# Patient Record
Sex: MALE | Race: WHITE | NOT HISPANIC OR LATINO | Employment: OTHER | URBAN - METROPOLITAN AREA
[De-identification: names, ages, dates, MRNs, and addresses within clinical notes are randomized per-mention and may not be internally consistent; named-entity substitution may affect disease eponyms.]

---

## 2024-09-02 ENCOUNTER — APPOINTMENT (OUTPATIENT)
Dept: CT IMAGING | Facility: HOSPITAL | Age: 84
End: 2024-09-02
Payer: MEDICARE

## 2024-09-02 ENCOUNTER — APPOINTMENT (OUTPATIENT)
Dept: GENERAL RADIOLOGY | Facility: HOSPITAL | Age: 84
End: 2024-09-02
Payer: MEDICARE

## 2024-09-02 ENCOUNTER — HOSPITAL ENCOUNTER (INPATIENT)
Facility: HOSPITAL | Age: 84
LOS: 8 days | Discharge: HOME HEALTH | End: 2024-09-10
Attending: EMERGENCY MEDICINE | Admitting: HOSPITALIST
Payer: MEDICARE

## 2024-09-02 DIAGNOSIS — F03.90 DEMENTIA WITHOUT BEHAVIORAL DISTURBANCE: ICD-10-CM

## 2024-09-02 DIAGNOSIS — G93.40 ACUTE ENCEPHALOPATHY: ICD-10-CM

## 2024-09-02 DIAGNOSIS — A41.9 SEPSIS, DUE TO UNSPECIFIED ORGANISM, UNSPECIFIED WHETHER ACUTE ORGAN DYSFUNCTION PRESENT: Primary | ICD-10-CM

## 2024-09-02 DIAGNOSIS — J18.9 MULTIFOCAL PNEUMONIA: ICD-10-CM

## 2024-09-02 DIAGNOSIS — N17.9 ACUTE KIDNEY INJURY: ICD-10-CM

## 2024-09-02 DIAGNOSIS — J84.10 PULMONARY FIBROSIS: ICD-10-CM

## 2024-09-02 LAB
ALBUMIN SERPL-MCNC: 3.3 G/DL (ref 3.5–5.2)
ALBUMIN/GLOB SERPL: 1.1 G/DL
ALP SERPL-CCNC: 77 U/L (ref 39–117)
ALT SERPL W P-5'-P-CCNC: <5 U/L (ref 1–41)
AMMONIA BLD-SCNC: 29 UMOL/L (ref 16–60)
AMPHET+METHAMPHET UR QL: NEGATIVE
ANION GAP SERPL CALCULATED.3IONS-SCNC: 15 MMOL/L (ref 5–15)
AST SERPL-CCNC: 29 U/L (ref 1–40)
BARBITURATES UR QL SCN: NEGATIVE
BASOPHILS # BLD AUTO: 0.07 10*3/MM3 (ref 0–0.2)
BASOPHILS NFR BLD AUTO: 0.3 % (ref 0–1.5)
BENZODIAZ UR QL SCN: POSITIVE
BILIRUB SERPL-MCNC: 0.5 MG/DL (ref 0–1.2)
BUN SERPL-MCNC: 38 MG/DL (ref 8–23)
BUN/CREAT SERPL: 12.1 (ref 7–25)
CALCIUM SPEC-SCNC: 8.3 MG/DL (ref 8.6–10.5)
CANNABINOIDS SERPL QL: NEGATIVE
CHLORIDE SERPL-SCNC: 96 MMOL/L (ref 98–107)
CO2 SERPL-SCNC: 19 MMOL/L (ref 22–29)
COCAINE UR QL: NEGATIVE
CREAT SERPL-MCNC: 3.13 MG/DL (ref 0.76–1.27)
D-LACTATE SERPL-SCNC: 4.6 MMOL/L (ref 0.5–2)
DEPRECATED RDW RBC AUTO: 49.5 FL (ref 37–54)
EGFRCR SERPLBLD CKD-EPI 2021: 18.6 ML/MIN/1.73
EOSINOPHIL # BLD AUTO: 0.15 10*3/MM3 (ref 0–0.4)
EOSINOPHIL NFR BLD AUTO: 0.6 % (ref 0.3–6.2)
ERYTHROCYTE [DISTWIDTH] IN BLOOD BY AUTOMATED COUNT: 15.2 % (ref 12.3–15.4)
ETHANOL BLD-MCNC: <10 MG/DL (ref 0–10)
ETHANOL UR QL: <0.01 %
FENTANYL UR-MCNC: NEGATIVE NG/ML
GLOBULIN UR ELPH-MCNC: 3.1 GM/DL
GLUCOSE SERPL-MCNC: 141 MG/DL (ref 65–99)
HCT VFR BLD AUTO: 33.2 % (ref 37.5–51)
HGB BLD-MCNC: 10.9 G/DL (ref 13–17.7)
IMM GRANULOCYTES # BLD AUTO: 0.9 10*3/MM3 (ref 0–0.05)
IMM GRANULOCYTES NFR BLD AUTO: 3.5 % (ref 0–0.5)
INR PPP: 1.39 (ref 0.9–1.1)
LYMPHOCYTES # BLD AUTO: 1.79 10*3/MM3 (ref 0.7–3.1)
LYMPHOCYTES NFR BLD AUTO: 6.9 % (ref 19.6–45.3)
MAGNESIUM SERPL-MCNC: 1.8 MG/DL (ref 1.6–2.4)
MCH RBC QN AUTO: 29.3 PG (ref 26.6–33)
MCHC RBC AUTO-ENTMCNC: 32.8 G/DL (ref 31.5–35.7)
MCV RBC AUTO: 89.2 FL (ref 79–97)
METHADONE UR QL SCN: NEGATIVE
MONOCYTES # BLD AUTO: 1.7 10*3/MM3 (ref 0.1–0.9)
MONOCYTES NFR BLD AUTO: 6.6 % (ref 5–12)
NEUTROPHILS NFR BLD AUTO: 21.17 10*3/MM3 (ref 1.7–7)
NEUTROPHILS NFR BLD AUTO: 82.1 % (ref 42.7–76)
NRBC BLD AUTO-RTO: 0 /100 WBC (ref 0–0.2)
OPIATES UR QL: NEGATIVE
OXYCODONE UR QL SCN: NEGATIVE
PLATELET # BLD AUTO: 190 10*3/MM3 (ref 140–450)
PMV BLD AUTO: 9.3 FL (ref 6–12)
POTASSIUM SERPL-SCNC: 5.4 MMOL/L (ref 3.5–5.2)
PROCALCITONIN SERPL-MCNC: 48.3 NG/ML (ref 0–0.25)
PROT SERPL-MCNC: 6.4 G/DL (ref 6–8.5)
PROTHROMBIN TIME: 17.3 SECONDS (ref 11.7–14.2)
RBC # BLD AUTO: 3.72 10*6/MM3 (ref 4.14–5.8)
SODIUM SERPL-SCNC: 130 MMOL/L (ref 136–145)
TSH SERPL DL<=0.05 MIU/L-ACNC: 1.88 UIU/ML (ref 0.27–4.2)
WBC NRBC COR # BLD AUTO: 25.78 10*3/MM3 (ref 3.4–10.8)

## 2024-09-02 PROCEDURE — 25010000002 CEFTRIAXONE PER 250 MG: Performed by: EMERGENCY MEDICINE

## 2024-09-02 PROCEDURE — 80307 DRUG TEST PRSMV CHEM ANLYZR: CPT | Performed by: EMERGENCY MEDICINE

## 2024-09-02 PROCEDURE — 87040 BLOOD CULTURE FOR BACTERIA: CPT | Performed by: EMERGENCY MEDICINE

## 2024-09-02 PROCEDURE — 83605 ASSAY OF LACTIC ACID: CPT | Performed by: EMERGENCY MEDICINE

## 2024-09-02 PROCEDURE — 84145 PROCALCITONIN (PCT): CPT | Performed by: EMERGENCY MEDICINE

## 2024-09-02 PROCEDURE — 36415 COLL VENOUS BLD VENIPUNCTURE: CPT

## 2024-09-02 PROCEDURE — 81001 URINALYSIS AUTO W/SCOPE: CPT | Performed by: EMERGENCY MEDICINE

## 2024-09-02 PROCEDURE — 25810000003 SEPSIS FLUID NS 0.9 % SOLUTION: Performed by: EMERGENCY MEDICINE

## 2024-09-02 PROCEDURE — 87086 URINE CULTURE/COLONY COUNT: CPT | Performed by: HOSPITALIST

## 2024-09-02 PROCEDURE — 85610 PROTHROMBIN TIME: CPT | Performed by: EMERGENCY MEDICINE

## 2024-09-02 PROCEDURE — 80053 COMPREHEN METABOLIC PANEL: CPT | Performed by: EMERGENCY MEDICINE

## 2024-09-02 PROCEDURE — 99285 EMERGENCY DEPT VISIT HI MDM: CPT

## 2024-09-02 PROCEDURE — 83735 ASSAY OF MAGNESIUM: CPT | Performed by: EMERGENCY MEDICINE

## 2024-09-02 PROCEDURE — 85025 COMPLETE CBC W/AUTO DIFF WBC: CPT | Performed by: EMERGENCY MEDICINE

## 2024-09-02 PROCEDURE — 82077 ASSAY SPEC XCP UR&BREATH IA: CPT | Performed by: EMERGENCY MEDICINE

## 2024-09-02 PROCEDURE — 84443 ASSAY THYROID STIM HORMONE: CPT | Performed by: EMERGENCY MEDICINE

## 2024-09-02 PROCEDURE — 70450 CT HEAD/BRAIN W/O DYE: CPT

## 2024-09-02 PROCEDURE — 71045 X-RAY EXAM CHEST 1 VIEW: CPT

## 2024-09-02 PROCEDURE — 82140 ASSAY OF AMMONIA: CPT | Performed by: EMERGENCY MEDICINE

## 2024-09-02 RX ADMIN — SODIUM CHLORIDE 3480 ML: 9 INJECTION, SOLUTION INTRAVENOUS at 21:37

## 2024-09-02 RX ADMIN — CEFTRIAXONE 2000 MG: 2 INJECTION, POWDER, FOR SOLUTION INTRAMUSCULAR; INTRAVENOUS at 23:43

## 2024-09-03 ENCOUNTER — APPOINTMENT (OUTPATIENT)
Dept: CT IMAGING | Facility: HOSPITAL | Age: 84
End: 2024-09-03
Payer: MEDICARE

## 2024-09-03 PROBLEM — N17.9 AKI (ACUTE KIDNEY INJURY): Status: ACTIVE | Noted: 2024-09-03

## 2024-09-03 PROBLEM — M35.3 PMR (POLYMYALGIA RHEUMATICA): Status: ACTIVE | Noted: 2024-09-03

## 2024-09-03 PROBLEM — J84.10 PULMONARY FIBROSIS: Status: ACTIVE | Noted: 2024-09-03

## 2024-09-03 PROBLEM — G20.A1 PARKINSON DISEASE: Status: ACTIVE | Noted: 2024-09-03

## 2024-09-03 PROBLEM — N18.32 STAGE 3B CHRONIC KIDNEY DISEASE: Status: ACTIVE | Noted: 2024-09-03

## 2024-09-03 PROBLEM — F03.90 DEMENTIA WITHOUT BEHAVIORAL DISTURBANCE: Status: ACTIVE | Noted: 2024-09-03

## 2024-09-03 PROBLEM — M06.00 RHEUMATOID ARTHRITIS WITH NEGATIVE RHEUMATOID FACTOR: Status: ACTIVE | Noted: 2024-09-03

## 2024-09-03 PROBLEM — R19.7 DIARRHEA: Status: ACTIVE | Noted: 2024-09-03

## 2024-09-03 PROBLEM — Z79.52 CURRENT CHRONIC USE OF SYSTEMIC STEROIDS: Status: ACTIVE | Noted: 2024-09-03

## 2024-09-03 LAB
AMORPH URATE CRY URNS QL MICRO: ABNORMAL /HPF
ANION GAP SERPL CALCULATED.3IONS-SCNC: 17.7 MMOL/L (ref 5–15)
BACTERIA UR QL AUTO: ABNORMAL /HPF
BILIRUB UR QL STRIP: NEGATIVE
BUN SERPL-MCNC: 38 MG/DL (ref 8–23)
BUN/CREAT SERPL: 12.5 (ref 7–25)
C DIFF TOX GENS STL QL NAA+PROBE: NEGATIVE
CALCIUM SPEC-SCNC: 7.9 MG/DL (ref 8.6–10.5)
CHLORIDE SERPL-SCNC: 98 MMOL/L (ref 98–107)
CLARITY UR: ABNORMAL
CO2 SERPL-SCNC: 18.3 MMOL/L (ref 22–29)
COLOR UR: YELLOW
CREAT SERPL-MCNC: 3.05 MG/DL (ref 0.76–1.27)
D-LACTATE SERPL-SCNC: 2.2 MMOL/L (ref 0.5–2)
D-LACTATE SERPL-SCNC: 2.5 MMOL/L (ref 0.5–2)
D-LACTATE SERPL-SCNC: 2.7 MMOL/L (ref 0.5–2)
D-LACTATE SERPL-SCNC: 2.8 MMOL/L (ref 0.5–2)
D-LACTATE SERPL-SCNC: 4.3 MMOL/L (ref 0.5–2)
DEPRECATED RDW RBC AUTO: 46.6 FL (ref 37–54)
EGFRCR SERPLBLD CKD-EPI 2021: 19.2 ML/MIN/1.73
ERYTHROCYTE [DISTWIDTH] IN BLOOD BY AUTOMATED COUNT: 14.9 % (ref 12.3–15.4)
GLUCOSE SERPL-MCNC: 113 MG/DL (ref 65–99)
GLUCOSE UR STRIP-MCNC: NEGATIVE MG/DL
GRAN CASTS URNS QL MICRO: ABNORMAL /LPF
HCT VFR BLD AUTO: 29.8 % (ref 37.5–51)
HGB BLD-MCNC: 9.8 G/DL (ref 13–17.7)
HGB UR QL STRIP.AUTO: NEGATIVE
HYALINE CASTS UR QL AUTO: ABNORMAL /LPF
KETONES UR QL STRIP: ABNORMAL
LEUKOCYTE ESTERASE UR QL STRIP.AUTO: ABNORMAL
MCH RBC QN AUTO: 28.6 PG (ref 26.6–33)
MCHC RBC AUTO-ENTMCNC: 32.9 G/DL (ref 31.5–35.7)
MCV RBC AUTO: 86.9 FL (ref 79–97)
MRSA DNA SPEC QL NAA+PROBE: NORMAL
NITRITE UR QL STRIP: NEGATIVE
PH UR STRIP.AUTO: 5.5 [PH] (ref 5–8)
PLATELET # BLD AUTO: 183 10*3/MM3 (ref 140–450)
PMV BLD AUTO: 9.4 FL (ref 6–12)
POTASSIUM SERPL-SCNC: 4.4 MMOL/L (ref 3.5–5.2)
PROT UR QL STRIP: ABNORMAL
RBC # BLD AUTO: 3.43 10*6/MM3 (ref 4.14–5.8)
RBC # UR STRIP: ABNORMAL /HPF
REF LAB TEST METHOD: ABNORMAL
SODIUM SERPL-SCNC: 134 MMOL/L (ref 136–145)
SP GR UR STRIP: 1.02 (ref 1–1.03)
SQUAMOUS #/AREA URNS HPF: ABNORMAL /HPF
UROBILINOGEN UR QL STRIP: ABNORMAL
WBC # UR STRIP: ABNORMAL /HPF
WBC NRBC COR # BLD AUTO: 23.05 10*3/MM3 (ref 3.4–10.8)

## 2024-09-03 PROCEDURE — 94799 UNLISTED PULMONARY SVC/PX: CPT

## 2024-09-03 PROCEDURE — 74176 CT ABD & PELVIS W/O CONTRAST: CPT

## 2024-09-03 PROCEDURE — 36415 COLL VENOUS BLD VENIPUNCTURE: CPT | Performed by: NURSE PRACTITIONER

## 2024-09-03 PROCEDURE — 85027 COMPLETE CBC AUTOMATED: CPT | Performed by: NURSE PRACTITIONER

## 2024-09-03 PROCEDURE — 63710000001 PREDNISONE PER 5 MG: Performed by: HOSPITALIST

## 2024-09-03 PROCEDURE — 80048 BASIC METABOLIC PNL TOTAL CA: CPT | Performed by: NURSE PRACTITIONER

## 2024-09-03 PROCEDURE — 87493 C DIFF AMPLIFIED PROBE: CPT | Performed by: HOSPITALIST

## 2024-09-03 PROCEDURE — 83605 ASSAY OF LACTIC ACID: CPT | Performed by: EMERGENCY MEDICINE

## 2024-09-03 PROCEDURE — 25010000002 VANCOMYCIN 10 G RECONSTITUTED SOLUTION: Performed by: HOSPITALIST

## 2024-09-03 PROCEDURE — 87641 MR-STAPH DNA AMP PROBE: CPT | Performed by: HOSPITALIST

## 2024-09-03 PROCEDURE — 94640 AIRWAY INHALATION TREATMENT: CPT

## 2024-09-03 PROCEDURE — 25010000002 CEFTRIAXONE PER 250 MG: Performed by: HOSPITALIST

## 2024-09-03 PROCEDURE — 94761 N-INVAS EAR/PLS OXIMETRY MLT: CPT

## 2024-09-03 PROCEDURE — 25810000003 SODIUM CHLORIDE 0.9 % SOLUTION: Performed by: HOSPITALIST

## 2024-09-03 RX ORDER — ACETAMINOPHEN 325 MG/1
650 TABLET ORAL EVERY 4 HOURS PRN
Status: DISCONTINUED | OUTPATIENT
Start: 2024-09-03 | End: 2024-09-10 | Stop reason: HOSPADM

## 2024-09-03 RX ORDER — BISACODYL 10 MG
10 SUPPOSITORY, RECTAL RECTAL DAILY PRN
Status: DISCONTINUED | OUTPATIENT
Start: 2024-09-03 | End: 2024-09-10 | Stop reason: HOSPADM

## 2024-09-03 RX ORDER — ACETAMINOPHEN 650 MG/1
650 SUPPOSITORY RECTAL EVERY 4 HOURS PRN
Status: DISCONTINUED | OUTPATIENT
Start: 2024-09-03 | End: 2024-09-10 | Stop reason: HOSPADM

## 2024-09-03 RX ORDER — FLUTICASONE PROPIONATE 50 MCG
2 SPRAY, SUSPENSION (ML) NASAL DAILY
Status: DISCONTINUED | OUTPATIENT
Start: 2024-09-03 | End: 2024-09-10 | Stop reason: HOSPADM

## 2024-09-03 RX ORDER — PROBENECID 500 MG/1
500 TABLET, FILM COATED ORAL 2 TIMES DAILY
COMMUNITY

## 2024-09-03 RX ORDER — PROBENECID 500 MG/1
500 TABLET, FILM COATED ORAL 2 TIMES DAILY
Status: DISCONTINUED | OUTPATIENT
Start: 2024-09-03 | End: 2024-09-04

## 2024-09-03 RX ORDER — ASPIRIN 81 MG/1
81 TABLET ORAL DAILY
Status: DISCONTINUED | OUTPATIENT
Start: 2024-09-03 | End: 2024-09-10 | Stop reason: HOSPADM

## 2024-09-03 RX ORDER — CALCIUM CARBONATE 500 MG/1
2 TABLET, CHEWABLE ORAL 2 TIMES DAILY PRN
Status: DISCONTINUED | OUTPATIENT
Start: 2024-09-03 | End: 2024-09-10 | Stop reason: HOSPADM

## 2024-09-03 RX ORDER — GUAIFENESIN 600 MG/1
600 TABLET, EXTENDED RELEASE ORAL 2 TIMES DAILY
COMMUNITY

## 2024-09-03 RX ORDER — DONEPEZIL HYDROCHLORIDE 10 MG/1
10 TABLET, FILM COATED ORAL DAILY
Status: DISCONTINUED | OUTPATIENT
Start: 2024-09-03 | End: 2024-09-10 | Stop reason: HOSPADM

## 2024-09-03 RX ORDER — POLYETHYLENE GLYCOL 3350 17 G/17G
17 POWDER, FOR SOLUTION ORAL DAILY PRN
Status: DISCONTINUED | OUTPATIENT
Start: 2024-09-03 | End: 2024-09-10 | Stop reason: HOSPADM

## 2024-09-03 RX ORDER — MODAFINIL 100 MG/1
400 TABLET ORAL DAILY
Status: COMPLETED | OUTPATIENT
Start: 2024-09-03 | End: 2024-09-07

## 2024-09-03 RX ORDER — MODAFINIL 100 MG/1
400 TABLET ORAL DAILY
COMMUNITY

## 2024-09-03 RX ORDER — MEMANTINE HYDROCHLORIDE 10 MG/1
10 TABLET ORAL DAILY
COMMUNITY

## 2024-09-03 RX ORDER — TAMSULOSIN HYDROCHLORIDE 0.4 MG/1
1 CAPSULE ORAL 2 TIMES DAILY
COMMUNITY

## 2024-09-03 RX ORDER — ATORVASTATIN CALCIUM 20 MG/1
10 TABLET, FILM COATED ORAL DAILY
Status: DISCONTINUED | OUTPATIENT
Start: 2024-09-03 | End: 2024-09-10 | Stop reason: HOSPADM

## 2024-09-03 RX ORDER — GUAIFENESIN 600 MG/1
600 TABLET, EXTENDED RELEASE ORAL 2 TIMES DAILY
Status: DISCONTINUED | OUTPATIENT
Start: 2024-09-03 | End: 2024-09-10 | Stop reason: HOSPADM

## 2024-09-03 RX ORDER — DOCUSATE SODIUM 100 MG/1
100 CAPSULE, LIQUID FILLED ORAL DAILY
Status: DISCONTINUED | OUTPATIENT
Start: 2024-09-03 | End: 2024-09-04

## 2024-09-03 RX ORDER — MEMANTINE HYDROCHLORIDE 10 MG/1
10 TABLET ORAL DAILY
Status: DISCONTINUED | OUTPATIENT
Start: 2024-09-03 | End: 2024-09-10 | Stop reason: HOSPADM

## 2024-09-03 RX ORDER — SODIUM CHLORIDE 0.9 % (FLUSH) 0.9 %
10 SYRINGE (ML) INJECTION EVERY 12 HOURS SCHEDULED
Status: DISCONTINUED | OUTPATIENT
Start: 2024-09-03 | End: 2024-09-10 | Stop reason: HOSPADM

## 2024-09-03 RX ORDER — CYANOCOBALAMIN 1000 UG/ML
1000 INJECTION, SOLUTION INTRAMUSCULAR; SUBCUTANEOUS
Status: ON HOLD | COMMUNITY
End: 2024-09-03

## 2024-09-03 RX ORDER — ONDANSETRON 4 MG/1
4 TABLET, ORALLY DISINTEGRATING ORAL EVERY 6 HOURS PRN
Status: DISCONTINUED | OUTPATIENT
Start: 2024-09-03 | End: 2024-09-10 | Stop reason: HOSPADM

## 2024-09-03 RX ORDER — ATORVASTATIN CALCIUM 10 MG/1
10 TABLET, FILM COATED ORAL DAILY
COMMUNITY

## 2024-09-03 RX ORDER — IPRATROPIUM BROMIDE AND ALBUTEROL SULFATE 2.5; .5 MG/3ML; MG/3ML
3 SOLUTION RESPIRATORY (INHALATION)
Status: DISCONTINUED | OUTPATIENT
Start: 2024-09-03 | End: 2024-09-10 | Stop reason: HOSPADM

## 2024-09-03 RX ORDER — SODIUM CHLORIDE 9 MG/ML
40 INJECTION, SOLUTION INTRAVENOUS AS NEEDED
Status: DISCONTINUED | OUTPATIENT
Start: 2024-09-03 | End: 2024-09-10 | Stop reason: HOSPADM

## 2024-09-03 RX ORDER — CARBIDOPA AND LEVODOPA 50; 200 MG/1; MG/1
2 TABLET, EXTENDED RELEASE ORAL DAILY
Status: DISCONTINUED | OUTPATIENT
Start: 2024-09-03 | End: 2024-09-10 | Stop reason: HOSPADM

## 2024-09-03 RX ORDER — NITROGLYCERIN 0.4 MG/1
0.4 TABLET SUBLINGUAL
Status: DISCONTINUED | OUTPATIENT
Start: 2024-09-03 | End: 2024-09-10 | Stop reason: HOSPADM

## 2024-09-03 RX ORDER — ASPIRIN 81 MG/1
81 TABLET ORAL DAILY
COMMUNITY

## 2024-09-03 RX ORDER — BISACODYL 5 MG/1
5 TABLET, DELAYED RELEASE ORAL DAILY PRN
Status: DISCONTINUED | OUTPATIENT
Start: 2024-09-03 | End: 2024-09-10 | Stop reason: HOSPADM

## 2024-09-03 RX ORDER — ACETAMINOPHEN 160 MG/5ML
650 SOLUTION ORAL EVERY 4 HOURS PRN
Status: DISCONTINUED | OUTPATIENT
Start: 2024-09-03 | End: 2024-09-10 | Stop reason: HOSPADM

## 2024-09-03 RX ORDER — ICOSAPENT ETHYL 1 G/1
2 CAPSULE ORAL DAILY
COMMUNITY

## 2024-09-03 RX ORDER — CARBIDOPA AND LEVODOPA 50; 200 MG/1; MG/1
2 TABLET, EXTENDED RELEASE ORAL DAILY
COMMUNITY

## 2024-09-03 RX ORDER — SODIUM CHLORIDE 0.9 % (FLUSH) 0.9 %
10 SYRINGE (ML) INJECTION AS NEEDED
Status: DISCONTINUED | OUTPATIENT
Start: 2024-09-03 | End: 2024-09-10 | Stop reason: HOSPADM

## 2024-09-03 RX ORDER — DONEPEZIL HYDROCHLORIDE 10 MG/1
10 TABLET, FILM COATED ORAL DAILY
COMMUNITY

## 2024-09-03 RX ORDER — FLUTICASONE PROPIONATE 50 MCG
2 SPRAY, SUSPENSION (ML) NASAL DAILY
COMMUNITY

## 2024-09-03 RX ORDER — AMOXICILLIN 250 MG
2 CAPSULE ORAL 2 TIMES DAILY PRN
Status: DISCONTINUED | OUTPATIENT
Start: 2024-09-03 | End: 2024-09-10 | Stop reason: HOSPADM

## 2024-09-03 RX ORDER — TAMSULOSIN HYDROCHLORIDE 0.4 MG/1
0.4 CAPSULE ORAL 2 TIMES DAILY
Status: DISCONTINUED | OUTPATIENT
Start: 2024-09-03 | End: 2024-09-10 | Stop reason: HOSPADM

## 2024-09-03 RX ORDER — PREDNISONE 5 MG/1
5 TABLET ORAL DAILY
COMMUNITY
Start: 2024-08-09 | End: 2024-11-07

## 2024-09-03 RX ORDER — ONDANSETRON 2 MG/ML
4 INJECTION INTRAMUSCULAR; INTRAVENOUS EVERY 6 HOURS PRN
Status: DISCONTINUED | OUTPATIENT
Start: 2024-09-03 | End: 2024-09-10 | Stop reason: HOSPADM

## 2024-09-03 RX ORDER — SODIUM CHLORIDE 9 MG/ML
100 INJECTION, SOLUTION INTRAVENOUS CONTINUOUS
Status: DISCONTINUED | OUTPATIENT
Start: 2024-09-03 | End: 2024-09-03

## 2024-09-03 RX ORDER — DOCUSATE SODIUM 100 MG/1
100 CAPSULE, LIQUID FILLED ORAL DAILY
COMMUNITY

## 2024-09-03 RX ADMIN — DONEPEZIL HYDROCHLORIDE 10 MG: 10 TABLET, FILM COATED ORAL at 13:32

## 2024-09-03 RX ADMIN — GUAIFENESIN 600 MG: 600 TABLET, EXTENDED RELEASE ORAL at 13:33

## 2024-09-03 RX ADMIN — MEMANTINE HYDROCHLORIDE 10 MG: 10 TABLET, FILM COATED ORAL at 13:32

## 2024-09-03 RX ADMIN — FLUTICASONE PROPIONATE 2 SPRAY: 50 SPRAY, METERED NASAL at 13:31

## 2024-09-03 RX ADMIN — IPRATROPIUM BROMIDE AND ALBUTEROL SULFATE 3 ML: .5; 3 SOLUTION RESPIRATORY (INHALATION) at 15:17

## 2024-09-03 RX ADMIN — PREDNISONE 12.5 MG: 10 TABLET ORAL at 13:32

## 2024-09-03 RX ADMIN — PROBENECID 500 MG: 500 TABLET, FILM COATED ORAL at 13:31

## 2024-09-03 RX ADMIN — IPRATROPIUM BROMIDE AND ALBUTEROL SULFATE 3 ML: .5; 3 SOLUTION RESPIRATORY (INHALATION) at 21:09

## 2024-09-03 RX ADMIN — MODAFINIL 400 MG: 100 TABLET ORAL at 18:01

## 2024-09-03 RX ADMIN — VANCOMYCIN HYDROCHLORIDE 2250 MG: 10 INJECTION, POWDER, LYOPHILIZED, FOR SOLUTION INTRAVENOUS at 15:44

## 2024-09-03 RX ADMIN — TAMSULOSIN HYDROCHLORIDE 0.4 MG: 0.4 CAPSULE ORAL at 13:32

## 2024-09-03 RX ADMIN — CEFTRIAXONE 2000 MG: 2 INJECTION, POWDER, FOR SOLUTION INTRAMUSCULAR; INTRAVENOUS at 23:10

## 2024-09-03 RX ADMIN — Medication 10 ML: at 21:00

## 2024-09-03 RX ADMIN — TAMSULOSIN HYDROCHLORIDE 0.4 MG: 0.4 CAPSULE ORAL at 23:15

## 2024-09-03 RX ADMIN — IPRATROPIUM BROMIDE AND ALBUTEROL SULFATE 3 ML: .5; 3 SOLUTION RESPIRATORY (INHALATION) at 11:26

## 2024-09-03 RX ADMIN — GUAIFENESIN 600 MG: 600 TABLET, EXTENDED RELEASE ORAL at 20:59

## 2024-09-03 RX ADMIN — SODIUM CHLORIDE 100 ML/HR: 9 INJECTION, SOLUTION INTRAVENOUS at 01:55

## 2024-09-03 RX ADMIN — PROBENECID 500 MG: 500 TABLET, FILM COATED ORAL at 23:13

## 2024-09-03 RX ADMIN — CARBIDOPA AND LEVODOPA 2 TABLET: 50; 200 TABLET, EXTENDED RELEASE ORAL at 13:32

## 2024-09-03 RX ADMIN — ATORVASTATIN CALCIUM 10 MG: 20 TABLET, FILM COATED ORAL at 13:32

## 2024-09-03 RX ADMIN — ASPIRIN 81 MG: 81 TABLET, COATED ORAL at 13:32

## 2024-09-03 NOTE — H&P
Patient Name:  Allan Kruger  YOB: 1940  MRN:  [id number]  Admit Date:  9/2/2024  Patient Care Team:  Provider, No Known as PCP - General      Subjective   History Present Illness     Chief Complaint   Patient presents with    Altered Mental Status       Mr. Kruger is a 84 y.o. 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  Details of the presentation are sketchy as ED note is not complete and patient cannot provide reliable history.  He answers yes and no questions for the most part but cannot really tell me why he came in.  He denies any current pain, nausea or vomiting.  He looks to be wheezing but denies shortness of breath.  Findings in ED were of significant JASS along with some hypotension.  He was bolused with fluids and responded appropriately.  He had significant leukocytosis and lactic acidosis as well although source of potential sepsis is still somewhat uncertain.    Review of Systems   Unable to perform ROS: Dementia        Personal History     Past Medical History:   Diagnosis Date    Alzheimer disease     CKD (chronic kidney disease)     Hyperlipidemia     Interstitial lung disease     Parkinson disease     PMR (polymyalgia rheumatica)     Renal cancer      Past Surgical History:   Procedure Laterality Date    NEPHRECTOMY Right      History reviewed. No pertinent family history.  Social History     Tobacco Use    Smoking status: Unknown   Vaping Use    Vaping status: Never Used   Substance Use Topics    Drug use: Never     No current facility-administered medications on file prior to encounter.     Current Outpatient Medications on File Prior to Encounter   Medication Sig Dispense Refill    aspirin 81 MG EC tablet Take 1 tablet by mouth  Daily.      atorvastatin (LIPITOR) 10 MG tablet Take 1 tablet by mouth Daily.      carbidopa-levodopa CR (SINEMET CR)  MG per CR tablet Take 2 tablets by mouth Daily.      cholecalciferol (VITAMIN D3) 1.25 MG (87680 UT) capsule Take 5,000 Units by mouth Daily.      docusate sodium (COLACE) 100 MG capsule Take 1 capsule by mouth Daily.      donepezil (ARICEPT) 10 MG tablet Take 1 tablet by mouth Daily.      fluticasone (FLONASE) 50 MCG/ACT nasal spray 2 sprays into the nostril(s) as directed by provider Daily.      guaiFENesin (MUCINEX) 600 MG 12 hr tablet Take 1 tablet by mouth 2 (Two) Times a Day.      icosapent ethyl (VASCEPA) 1 g capsule capsule Take 2 g by mouth Daily.      memantine (NAMENDA) 10 MG tablet Take 1 tablet by mouth Daily.      modafinil (PROVIGIL) 100 MG tablet Take 4 tablets by mouth Daily.      predniSONE (DELTASONE) 5 MG tablet Take 1 tablet by mouth Daily. Take 12.5 MG daily      probenecid (BENEMID) 500 MG tablet Take 1 tablet by mouth 2 (Two) Times a Day.      tamsulosin (FLOMAX) 0.4 MG capsule 24 hr capsule Take 1 capsule by mouth 2 (Two) Times a Day.      [DISCONTINUED] cyanocobalamin 1000 MCG/ML injection Inject 1 mL into the appropriate muscle as directed by prescriber Every 28 (Twenty-Eight) Days.       Allergies   Allergen Reactions    Augmentin [Amoxicillin-Pot Clavulanate] Rash    Neosporin [Bacitracin-Polymyxin B] Rash       Objective    Objective     Vital Signs  Temp:  [97 °F (36.1 °C)-98.2 °F (36.8 °C)] 98.2 °F (36.8 °C)  Heart Rate:  [64-73] 73  Resp:  [20-26] 24  BP: ()/(42-70) 112/42  SpO2:  [90 %-96 %] 94 %  on   ;   Device (Oxygen Therapy): room air  There is no height or weight on file to calculate BMI.    Physical Exam  Vitals reviewed.   Constitutional:       General: He is not in acute distress.     Appearance: He is morbidly obese.      Comments: Somewhat lethargic but awake.  Cushingoid appearing   Cardiovascular:      Rate and Rhythm: Normal rate and  regular rhythm.   Pulmonary:      Effort: Pulmonary effort is normal.      Breath sounds: Wheezing and rales present.   Abdominal:      General: There is no distension.      Palpations: Abdomen is soft.      Tenderness: There is no abdominal tenderness.   Musculoskeletal:      Right lower leg: Edema present.      Left lower leg: Edema present.   Skin:     General: Skin is warm and dry.      Comments: Erythema of the left lower extremity.  Mildly tender on the dorsum of the left foot   Neurological:      Comments: As above.  Follows commands   Psychiatric:         Cognition and Memory: Cognition is impaired.         Results Review:  I reviewed the patient's new clinical results.  I reviewed the patient's new imaging results and agree with the interpretation.  I reviewed the patient's other test results and agree with the interpretation  I personally viewed and interpreted the patient's EKG/Telemetry data  Extensive review of records from other facilities.  He has not been here before    Lab Results (last 24 hours)       Procedure Component Value Units Date/Time    CBC & Differential [[id number]]  (Abnormal) Collected: 09/02/24 2139    Specimen: Blood Updated: 09/02/24 2152    Narrative:      The following orders were created for panel order CBC & Differential.  Procedure                               Abnormality         Status                     ---------                               -----------         ------                     CBC Auto Differential[[id number]]        Abnormal            Final result                 Please view results for these tests on the individual orders.    Protime-INR [[id number]]  (Abnormal) Collected: 09/02/24 2139    Specimen: Blood Updated: 09/02/24 2242     Protime 17.3 Seconds      INR 1.39    Ammonia [[id number]]  (Normal) Collected: 09/02/24 2139    Specimen: Blood Updated: 09/02/24 2203     Ammonia 29 umol/L     Ethanol [[id number]] Collected: 09/02/24 2139    Specimen: Blood Updated:  09/02/24 2215     Ethanol <10 mg/dL      Ethanol % <0.010 %     CBC Auto Differential [[id number]]  (Abnormal) Collected: 09/02/24 2139    Specimen: Blood Updated: 09/02/24 2152     WBC 25.78 10*3/mm3      RBC 3.72 10*6/mm3      Hemoglobin 10.9 g/dL      Hematocrit 33.2 %      MCV 89.2 fL      MCH 29.3 pg      MCHC 32.8 g/dL      RDW 15.2 %      RDW-SD 49.5 fl      MPV 9.3 fL      Platelets 190 10*3/mm3      Neutrophil % 82.1 %      Lymphocyte % 6.9 %      Monocyte % 6.6 %      Eosinophil % 0.6 %      Basophil % 0.3 %      Immature Grans % 3.5 %      Neutrophils, Absolute 21.17 10*3/mm3      Lymphocytes, Absolute 1.79 10*3/mm3      Monocytes, Absolute 1.70 10*3/mm3      Eosinophils, Absolute 0.15 10*3/mm3      Basophils, Absolute 0.07 10*3/mm3      Immature Grans, Absolute 0.90 10*3/mm3      nRBC 0.0 /100 WBC     Comprehensive Metabolic Panel [[id number]]  (Abnormal) Collected: 09/02/24 2221    Specimen: Blood Updated: 09/02/24 2252     Glucose 141 mg/dL      BUN 38 mg/dL      Creatinine 3.13 mg/dL      Sodium 130 mmol/L      Potassium 5.4 mmol/L      Comment: Slight hemolysis detected by analyzer. Result may be falsely elevated.        Chloride 96 mmol/L      CO2 19.0 mmol/L      Calcium 8.3 mg/dL      Total Protein 6.4 g/dL      Albumin 3.3 g/dL      ALT (SGPT) <5 U/L      AST (SGOT) 29 U/L      Alkaline Phosphatase 77 U/L      Total Bilirubin 0.5 mg/dL      Globulin 3.1 gm/dL      A/G Ratio 1.1 g/dL      BUN/Creatinine Ratio 12.1     Anion Gap 15.0 mmol/L      eGFR 18.6 mL/min/1.73     Narrative:      GFR Normal >60  Chronic Kidney Disease <60  Kidney Failure <15    The GFR formula is only valid for adults with stable renal function between ages 18 and 70.    Magnesium [[id number]]  (Normal) Collected: 09/02/24 2221    Specimen: Blood Updated: 09/02/24 2252     Magnesium 1.8 mg/dL     TSH [[id number]]  (Normal) Collected: 09/02/24 2221    Specimen: Blood Updated: 09/02/24 2257     TSH 1.880 uIU/mL     Procalcitonin  "[[id number]]  (Abnormal) Collected: 09/02/24 2221    Specimen: Blood Updated: 09/02/24 2257     Procalcitonin 48.30 ng/mL     Narrative:      As a Marker for Sepsis (Non-Neonates):    1. <0.5 ng/mL represents a low risk of severe sepsis and/or septic shock.  2. >2 ng/mL represents a high risk of severe sepsis and/or septic shock.    As a Marker for Lower Respiratory Tract Infections that require antibiotic therapy:    PCT on Admission    Antibiotic Therapy       6-12 Hrs later    >0.5                Strongly Recommended  >0.25 - <0.5        Recommended   0.1 - 0.25          Discouraged              Remeasure/reassess PCT  <0.1                Strongly Discouraged     Remeasure/reassess PCT    As 28 day mortality risk marker: \"Change in Procalcitonin Result\" (>80% or <=80%) if Day 0 (or Day 1) and Day 4 values are available. Refer to http://[email or web address]pct-calculator.[email or web address]    Change in PCT <=80%  A decrease of PCT levels below or equal to 80% defines a positive change in PCT test result representing a higher risk for 28-day all-cause mortality of patients diagnosed with severe sepsis for septic shock.    Change in PCT >80%  A decrease of PCT levels of more than 80% defines a negative change in PCT result representing a lower risk for 28-day all-cause mortality of patients diagnosed with severe sepsis or septic shock.       Urinalysis With Microscopic If Indicated (No Culture) - Urine, Clean Catch [[id number]]  (Abnormal) Collected: 09/02/24 2245    Specimen: Urine, Clean Catch Updated: 09/03/24 0022     Color, UA Yellow     Appearance, UA Turbid     pH, UA 5.5     Specific Gravity, UA 1.019     Glucose, UA Negative     Ketones, UA Trace     Bilirubin, UA Negative     Blood, UA Negative     Protein,  mg/dL (2+)     Leuk Esterase, UA Trace     Nitrite, UA Negative     Urobilinogen, UA 0.2 E.U./dL    Urine Drug Screen - Urine, Clean Catch [[id number]]  (Abnormal) Collected: 09/02/24 2245    Specimen: Urine, Clean Catch " Updated: 09/02/24 2331     Amphet/Methamphet, Screen Negative     Barbiturates Screen, Urine Negative     Benzodiazepine Screen, Urine Positive     Cocaine Screen, Urine Negative     Opiate Screen Negative     THC, Screen, Urine Negative     Methadone Screen, Urine Negative     Oxycodone Screen, Urine Negative     Fentanyl, Urine Negative    Narrative:      Negative Thresholds Per Drugs Screened:    Amphetamines                 500 ng/ml  Barbiturates                 200 ng/ml  Benzodiazepines              100 ng/ml  Cocaine                      300 ng/ml  Methadone                    300 ng/ml  Opiates                      300 ng/ml  Oxycodone                    100 ng/ml  THC                           50 ng/ml  Fentanyl                       5 ng/ml      The Normal Value for all drugs tested is negative. This report includes final unconfirmed screening results to be used for medical treatment purposes only. Unconfirmed results must not be used for non-medical purposes such as employment or legal testing. Clinical consideration should be applied to any drug of abuse test, particularly when unconfirmed results are used.            Urinalysis, Microscopic Only - Urine, Clean Catch [[id number]]  (Abnormal) Collected: 09/02/24 2245    Specimen: Urine, Clean Catch Updated: 09/03/24 0022     RBC, UA None Seen /HPF      WBC, UA 6-10 /HPF      Bacteria, UA Trace /HPF      Squamous Epithelial Cells, UA 3-6 /HPF      Hyaline Casts, UA 0-2 /LPF      Granular Casts, UA 0-2 /LPF      Amorphous Crystals, UA Small/1+ /HPF      Methodology Automated Microscopy    Urine Culture - Urine, Urine, Clean Catch [[id number]] Collected: 09/02/24 2245    Specimen: Urine, Clean Catch Updated: 09/03/24 0335    Lactic Acid, Plasma [[id number]]  (Abnormal) Collected: 09/02/24 2325    Specimen: Blood from Arm, Right Updated: 09/02/24 2357     Lactate 4.6 mmol/L     Blood Culture - Blood, Arm, Right [[id number]] Collected: 09/02/24 2325    Specimen:  Blood from Arm, Right Updated: 09/02/24 2333    Blood Culture - Blood, Arm, Left [[id number]] Collected: 09/02/24 2325    Specimen: Blood from Arm, Left Updated: 09/02/24 2333    STAT Lactic Acid, Reflex [[id number]]  (Abnormal) Collected: 09/03/24 0333    Specimen: Blood Updated: 09/03/24 0416     Lactate 4.3 mmol/L     Basic Metabolic Panel [[id number]]  (Abnormal) Collected: 09/03/24 0333    Specimen: Blood Updated: 09/03/24 0419     Glucose 113 mg/dL      BUN 38 mg/dL      Creatinine 3.05 mg/dL      Sodium 134 mmol/L      Potassium 4.4 mmol/L      Chloride 98 mmol/L      CO2 18.3 mmol/L      Calcium 7.9 mg/dL      BUN/Creatinine Ratio 12.5     Anion Gap 17.7 mmol/L      eGFR 19.2 mL/min/1.73     Narrative:      GFR Normal >60  Chronic Kidney Disease <60  Kidney Failure <15    The GFR formula is only valid for adults with stable renal function between ages 18 and 70.    CBC (No Diff) [[id number]]  (Abnormal) Collected: 09/03/24 0333    Specimen: Blood Updated: 09/03/24 0346     WBC 23.05 10*3/mm3      RBC 3.43 10*6/mm3      Hemoglobin 9.8 g/dL      Hematocrit 29.8 %      MCV 86.9 fL      MCH 28.6 pg      MCHC 32.9 g/dL      RDW 14.9 %      RDW-SD 46.6 fl      MPV 9.4 fL      Platelets 183 10*3/mm3     STAT Lactic Acid, Reflex [[id number]]  (Abnormal) Collected: 09/03/24 0640    Specimen: Blood Updated: 09/03/24 0713     Lactate 2.7 mmol/L     STAT Lactic Acid, Reflex [[id number]]  (Abnormal) Collected: 09/03/24 1002    Specimen: Blood Updated: 09/03/24 1120     Lactate 2.8 mmol/L             Imaging Results (Last 24 Hours)       Procedure Component Value Units Date/Time    CT Abdomen Pelvis Without Contrast [[id number]] Collected: 09/03/24 0055     Updated: 09/03/24 0106    Narrative:      CT OF THE ABDOMEN PELVIS WITHOUT CONTRAST     HISTORY: Sepsis     COMPARISON: None available     TECHNIQUE: Axial CT imaging was obtained through the abdomen and pelvis.  No IV contrast was administered.     FINDINGS:  Images through  the lung bases demonstrate chronic fibrotic changes,  there is some more focal airspace consolidation noted at both lung  bases. I'm uncertain if this is further chronic scarring, or a  superimposed infiltrate, in the absence of any prior studies for  comparison. There are coronary artery calcifications. There are  dystrophic calcifications of the aortic valve annulus. No suspicious  hepatic lesions are seen. The gallbladder, adrenal glands, and spleen  are normal. There are some pancreatic parenchymal calcifications, which  may reflect changes of chronic pancreatitis. Stomach and duodenum appear  mildly distended and fluid-filled. Right kidney is absent. No  hydronephrosis is seen on the left. There is a simple appearing left  renal cyst. No additional follow-up is necessary. There are extensive  aortoiliac calcifications. Urinary bladder is thick walled, and  correlation with urinalysis and urine cultures is recommended. Prostate  gland contains dystrophic calcifications. Rectal vault is distended with  stool, suggesting impaction. There is colonic diverticulosis. There is  no bowel obstruction. There is no evidence of appendicitis. There is a  fat-containing umbilical hernia. There is some perinephric and  periureteral stranding on the left, which may reflect ascending urinary  tract infection. No acute osseous abnormalities are seen. There is  lumbar scoliosis, with convexity to the left.       Impression:         1.The patient's stomach and duodenum appear somewhat patulous, which may  reflect gastroenteritis.  2. Fibrotic changes are noted at the lung bases bilaterally. There are  patchy areas of airspace consolidation noted at the lung bases. It is  impossible to know if the patient has any superimposed infiltrates, in  the absence of any prior studies for comparison.  3. Increased stool burden within the rectal vault, suggesting fecal  impaction. There is no evidence of proctitis.  4. Walled appearance to  the urinary bladder, along with some perinephric  and periureteral stranding on the left, which may reflect cystitis and  ascending urinary tract infection.     Radiation dose reduction techniques were utilized, including automated  exposure control and exposure modulation based on body size.        This report was finalized on 9/3/2024 1:03 AM by Dr. Jessie Oviedo M.D on Workstation: [id number]       XR Chest 1 View [[id number]] Collected: 09/02/24 2357     Updated: 09/03/24 0002    Narrative:      SINGLE VIEW OF THE CHEST     HISTORY: Sepsis     COMPARISON: None available.     FINDINGS:  There is cardiomegaly. Lung volumes appear diminished, with diffuse  interstitial prominence. Appearance suggests underlying chronic  interstitial lung disease and pulmonary fibrosis. Prior report did  describe chronic interstitial changes. Cannot exclude infiltrate within  the left lung, in the absence of any prior studies for comparison. No  pneumothorax or large effusion is seen. There are advanced degenerative  changes of the left shoulder.       Impression:      Overall diminished lung volumes, with diffuse interstitial prominence,  suggesting underlying chronic interstitial lung disease and pulmonary  fibrosis. I cannot exclude some infiltrate at the left lung base, in the  absence of any prior studies for comparison.     This report was finalized on 9/2/2024 11:59 PM by Dr. Jessie Oviedo M.D on Workstation: [id number]       CT Head Without Contrast [[id number]] Collected: 09/02/24 2238     Updated: 09/02/24 2250    Narrative:      CT OF THE HEAD WITHOUT CONTRAST     HISTORY: Altered mental status     COMPARISON: None available.     TECHNIQUE: Axial CT imaging was obtained through the brain. No IV  contrast was administered.     FINDINGS:  No acute intracranial hemorrhage is seen. There is diffuse atrophy.  There is periventricular and deep white matter pathic change. There is  no midline shift or mass effect.  There is mucosal thickening noted  within the ethmoid and maxillary sinuses. Mastoid air cells are clear.          Impression:      No acute intracranial findings.     Radiation dose reduction techniques were utilized, including automated  exposure control and exposure modulation based on body size.        This report was finalized on 9/2/2024 10:47 PM by Dr. Jessie Oviedo M.D on Workstation: [id number]                   Telemetry Scan   Final Result           Assessment/Plan     Active Hospital Problems    Diagnosis  POA    **Sepsis [A41.9]  Yes    PMR (polymyalgia rheumatica) [M35.3]  Yes    Current chronic use of systemic steroids [Z79.52]  Not Applicable    Diarrhea [R19.7]  Yes    JASS (acute kidney injury) [N17.9]  Yes    Stage 3b chronic kidney disease [N18.32]  Yes    Dementia without behavioral disturbance [F03.90]  Yes    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       Mr. Kruger is a 84 y.o. male with history of PMR on chronic prednisone, CKD 3B, seronegative RA, Parkinson's disease, UIP and Alzheimer's dementia presenting with acutely altered mental status per history and found to be hypotensive and likely septic of uncertain source    Extensive workup reviewed.  Really no obvious source for the sepsis.  There was some stranding around the left ureter but his urinalysis was fairly unimpressive.  He does have some erythema of the left lower extremity so we will need to cover for cellulitis.  Hemodynamically improved.  I am going to discontinue fluids since he is so edematous.  Likely would benefit from diuresis but will hold off until nephrology sees  Continue ceftriaxone.  Add vancomycin given possible skin infection.  Check MRSA PCR  Adding scheduled nebs  Check stool for C. difficile given severity of leukocytosis.  Actually had fair  amount of stool in the rectum on CT almost like fecal impaction so diarrhea may be just loose stool moving around the impacted harder stool  Nephrology consult pending for JASS/CKD  Reordered home meds, specifically prednisone given long-term use for PMR.  Reviewed recent note from rheumatology at [facility]      VTE Prophylaxis - SCDs.  Code Status - Full code.  Clarify with family/POA when available.  Guarded prognosis       Mateo Lee MD  [facility]  09/03/24  12:24 EDT

## 2024-09-03 NOTE — ED TRIAGE NOTES
Pt to ED from home via EMS for AMS. Family reported he's been confused all day. EMS reports pt has pin point pupils, hypotensive and decreased LOC.

## 2024-09-03 NOTE — ED NOTES
Nursing report ED to floor  Allan Kruger  84 y.o.  male    HPI :  HPI (Adult)  Stated Reason for Visit: AMS  History Obtained From: EMS    Chief Complaint  Chief Complaint   Patient presents with    Altered Mental Status       Admitting doctor:   Frankie Barrett MD    Admitting diagnosis:   The primary encounter diagnosis was Sepsis, due to unspecified organism, unspecified whether acute organ dysfunction present. Diagnoses of Acute kidney injury and Acute encephalopathy were also pertinent to this visit.    Code status:   Current Code Status       Date Active Code Status Order ID Comments User Context       Not on file            Allergies:   Patient has no known allergies.    Isolation:   No active isolations    Intake and Output  No intake or output data in the 24 hours ending 09/02/24 2336    Weight:       09/02/24 2057   Weight: 116 kg (255 lb)       Most recent vitals:   Vitals:    09/02/24 2216 09/02/24 2243 09/02/24 2244 09/02/24 2246   BP: 97/60 108/61 108/61 102/59   BP Location:  Right arm     Patient Position:  Lying     Pulse: 66  64 64   Resp:       Temp:       TempSrc:       SpO2: 95%  95% 92%   Weight:           Active LDAs/IV Access:   Lines, Drains & Airways       Active LDAs       Name Placement date Placement time Site Days    Peripheral IV Anterior;Right Forearm --  --  Forearm  --    Peripheral IV 09/02/24 2100 Anterior;Left;Proximal Forearm 09/02/24 2100  Forearm  less than 1    Peripheral IV 09/02/24 2138 Left;Posterior Hand 09/02/24 2138  Hand  less than 1                    Labs (abnormal labs have a star):   Labs Reviewed   COMPREHENSIVE METABOLIC PANEL - Abnormal; Notable for the following components:       Result Value    Glucose 141 (*)     BUN 38 (*)     Creatinine 3.13 (*)     Sodium 130 (*)     Potassium 5.4 (*)     Chloride 96 (*)     CO2 19.0 (*)     Calcium 8.3 (*)     Albumin 3.3 (*)     eGFR 18.6 (*)     All other components within normal limits    Narrative:     GFR  "Normal >60  Chronic Kidney Disease <60  Kidney Failure <15    The GFR formula is only valid for adults with stable renal function between ages 18 and 70.   PROTIME-INR - Abnormal; Notable for the following components:    Protime 17.3 (*)     INR 1.39 (*)     All other components within normal limits   URINALYSIS W/ MICROSCOPIC IF INDICATED (NO CULTURE) - Abnormal; Notable for the following components:    Appearance, UA Turbid (*)     Ketones, UA Trace (*)     Protein,  mg/dL (2+) (*)     Leuk Esterase, UA Trace (*)     All other components within normal limits   PROCALCITONIN - Abnormal; Notable for the following components:    Procalcitonin 48.30 (*)     All other components within normal limits    Narrative:     As a Marker for Sepsis (Non-Neonates):    1. <0.5 ng/mL represents a low risk of severe sepsis and/or septic shock.  2. >2 ng/mL represents a high risk of severe sepsis and/or septic shock.    As a Marker for Lower Respiratory Tract Infections that require antibiotic therapy:    PCT on Admission    Antibiotic Therapy       6-12 Hrs later    >0.5                Strongly Recommended  >0.25 - <0.5        Recommended   0.1 - 0.25          Discouraged              Remeasure/reassess PCT  <0.1                Strongly Discouraged     Remeasure/reassess PCT    As 28 day mortality risk marker: \"Change in Procalcitonin Result\" (>80% or <=80%) if Day 0 (or Day 1) and Day 4 values are available. Refer to [email or web address]-[email or web address]    Change in PCT <=80%  A decrease of PCT levels below or equal to 80% defines a positive change in PCT test result representing a higher risk for 28-day all-cause mortality of patients diagnosed with severe sepsis for septic shock.    Change in PCT >80%  A decrease of PCT levels of more than 80% defines a negative change in PCT result representing a lower risk for 28-day all-cause mortality of patients diagnosed with severe sepsis or septic shock.      URINE DRUG SCREEN - " Abnormal; Notable for the following components:    Benzodiazepine Screen, Urine Positive (*)     All other components within normal limits    Narrative:     Negative Thresholds Per Drugs Screened:    Amphetamines                 500 ng/ml  Barbiturates                 200 ng/ml  Benzodiazepines              100 ng/ml  Cocaine                      300 ng/ml  Methadone                    300 ng/ml  Opiates                      300 ng/ml  Oxycodone                    100 ng/ml  THC                           50 ng/ml  Fentanyl                       5 ng/ml      The Normal Value for all drugs tested is negative. This report includes final unconfirmed screening results to be used for medical treatment purposes only. Unconfirmed results must not be used for non-medical purposes such as employment or legal testing. Clinical consideration should be applied to any drug of abuse test, particularly when unconfirmed results are used.           CBC WITH AUTO DIFFERENTIAL - Abnormal; Notable for the following components:    WBC 25.78 (*)     RBC 3.72 (*)     Hemoglobin 10.9 (*)     Hematocrit 33.2 (*)     Neutrophil % 82.1 (*)     Lymphocyte % 6.9 (*)     Immature Grans % 3.5 (*)     Neutrophils, Absolute 21.17 (*)     Monocytes, Absolute 1.70 (*)     Immature Grans, Absolute 0.90 (*)     All other components within normal limits   AMMONIA - Normal   MAGNESIUM - Normal   TSH - Normal   BLOOD CULTURE   BLOOD CULTURE   ETHANOL   LACTIC ACID, PLASMA   URINALYSIS W/ CULTURE IF INDICATED   URINALYSIS, MICROSCOPIC ONLY   CBC AND DIFFERENTIAL    Narrative:     The following orders were created for panel order CBC & Differential.  Procedure                               Abnormality         Status                     ---------                               -----------         ------                     CBC Auto Differential[[id number]]        Abnormal            Final result                 Please view results for these tests on the  individual orders.       EKG:   No orders to display       Meds given in ED:   Medications   cefTRIAXone (ROCEPHIN) 2,000 mg in sodium chloride 0.9 % 100 mL MBP (has no administration in time range)   sepsis fluid NS 0.9 % bolus 3,480 mL (3,480 mL Intravenous New Bag 9/2/24 7405)       Imaging results:  CT Head Without Contrast    Result Date: 9/2/2024  No acute intracranial findings.  Radiation dose reduction techniques were utilized, including automated exposure control and exposure modulation based on body size.   This report was finalized on 9/2/2024 10:47 PM by Dr. Jessie Oviedo M.D on Workstation: [id number]       Ambulatory status:   - bed rest     Social issues:   Social History     Socioeconomic History    Marital status:        Peripheral Neurovascular  Peripheral Neurovascular (Adult)  Peripheral Neurovascular WDL: .WDL except, neurovascular assessment lower  LLE Neurovascular Assessment  Temperature LLE: warm  Color LLE: red  RLE Neurovascular Assessment  Temperature RLE: warm  Color RLE: red    Neuro Cognitive  Neuro Cognitive (Adult)  Cognitive/Neuro/Behavioral WDL: .WDL except, arousability, level of consciousness, mood/behavior, orientation, speech  Level of Consciousness: Responds to Voice  Arousal Level: arouses to voice  Orientation: disoriented to, place, time, situation  Speech: incoherent  Mood/Behavior: cooperative, calm    Learning  Learning Assessment (Adult)  Learning Readiness and Ability: cognitive limitation noted  Education Provided  Person Taught: family member/friend, patient  Teaching Method: verbal instruction  Teaching Focus: symptom/problem overview, diagnostic test, risk factors/triggers, self-management, medication administration  Education Outcome Evaluation: needs reinforcement, no evidence of learning, other (see comments) (Family at bedside, family verbalizes understanding and acceptance.)    Respiratory  Respiratory (Adult)  Airway WDL: WDL  Respiratory  WDL  Respiratory WDL: WDL    Abdominal Pain       Pain Assessments  Pain (Adult)  (0-10) Pain Rating: Rest: 0  (0-10) Pain Rating: Activity: 0    NIH Stroke Scale       Ashlee Castellanos RN  09/02/24 23:36 EDT

## 2024-09-03 NOTE — PROGRESS NOTES
"[facility] Clinical Pharmacy Services: Vancomycin Pharmacokinetic Initial Consult Note    Allan Kruger is a 84 y.o. male who is on day 1 of pharmacy to dose vancomycin.    Indication: Skin and Soft Tissue  Consulting Provider: Dr Lee  Planned Duration of Therapy: 5 days  Loading Dose Ordered or Given: see below  MRSA PCR performed: n/a;  Culture/Source:   9/2 BCx x2 and UCx in process    Target: -600 mg/L.hr   Pertinent Vanc Dosing History: none  Other Antimicrobials: ceftriaxone     Vitals/Labs  Ht: 193 cm (76\"); Wt: 114 kg (251 lb 12.3 oz)  Temp Readings from Last 1 Encounters:   09/03/24 98.2 °F (36.8 °C) (Oral)    Estimated Creatinine Clearance: 24 mL/min (A) (by C-G formula based on SCr of 3.05 mg/dL (H)).  CKD3     Results from last 7 days   Lab Units 09/03/24  0333 09/02/24  2221 09/02/24  2139   CREATININE mg/dL 3.05* 3.13*  --    WBC 10*3/mm3 23.05*  --  25.78*     Assessment/Plan:    Vancomycin Dose:   2250 mg IV x1 then intermittent vancomycin dosing   Vanc Random has been ordered for 9/4 at 0600     Pharmacy will follow patient's kidney function and will adjust doses and obtain levels as necessary. Thank you for involving pharmacy in this patient's care. Please contact pharmacy with any questions or concerns.                           Jorge Hale, [facility]  Clinical Pharmacist    "

## 2024-09-03 NOTE — ED PROVIDER NOTES
EMERGENCY DEPARTMENT ENCOUNTER  Room Number:  N535/1  PCP: Provider, No Known  Independent Historians: Patient, EMS who brought patient, son at bedside      HPI:  Chief Complaint: had concerns including Altered Mental Status.     A complete HPI/ROS/PMH/PSH/SH/FH are unobtainable due to:   Chronic or social conditions impacting patient care (Social Determinants of Health):       Context: Allan Kruger is a 84 y.o. male with a medical history of Parkinson's, history of PMR, history of renal cell cancer who presents to the ED c/o acute altered mental status.  Patient had episodes of vomiting through the early morning today some of which was projectile in nature.  Denied any abdominal pain or complaints of pain.  Throughout the day he has been less responsive than usual.  Tonight he was poorly responsive per caregiver and EMS was called.  Here in the ED patient does awake and answer questions but is oriented only to name.  He does follow some simple commands but mostly is quite somnolent.      Review of prior external notes (non-ED) -and- Review of prior external test results outside of this encounter:   I reviewed prior medical records including nephrology office note from Dr. Strickland about 2 weeks ago.  Patient with history of renal cell cancer status post nephrectomy, parkinsonism, hyperlipidemia and did have history of PMR in 2019 and is on prednisone.  He does have stage III chronic renal disease.      Prescription drug monitoring program review:         PAST MEDICAL HISTORY  Active Ambulatory Problems     Diagnosis Date Noted    No Active Ambulatory Problems     Resolved Ambulatory Problems     Diagnosis Date Noted    No Resolved Ambulatory Problems     Past Medical History:   Diagnosis Date    Alzheimer disease     CKD (chronic kidney disease)     Hyperlipidemia     Interstitial lung disease     Parkinson disease     PMR (polymyalgia rheumatica)     Renal cancer          PAST SURGICAL HISTORY  Past Surgical  History:   Procedure Laterality Date    NEPHRECTOMY Right          FAMILY HISTORY  History reviewed. No pertinent family history.      SOCIAL HISTORY  Social History     Socioeconomic History    Marital status:    Tobacco Use    Smoking status: Unknown   Vaping Use    Vaping status: Never Used   Substance and Sexual Activity    Drug use: Never    Sexual activity: Defer         ALLERGIES  Augmentin [amoxicillin-pot clavulanate] and Neosporin [bacitracin-polymyxin b]      REVIEW OF SYSTEMS  Review of Systems   Unable to perform ROS: Mental status change   Gastrointestinal:  Positive for nausea and vomiting.   Neurological:         Decreased responsiveness per family   Psychiatric/Behavioral:  Positive for confusion.      Included in HPI  All systems reviewed and negative except for those discussed in HPI.      PHYSICAL EXAM    I have reviewed the triage vital signs and nursing notes.    ED Triage Vitals   Temp Heart Rate Resp BP SpO2   09/02/24 2057 09/02/24 2040 09/02/24 2040 09/02/24 2040 09/02/24 2040   97 °F (36.1 °C) 68 26 94/70 90 %      Temp src Heart Rate Source Patient Position BP Location FiO2 (%)   09/02/24 2057 -- -- -- --   Rectal           Physical Exam  GENERAL: 84-year-old male who appears stated age.  He is somnolent but arouses to voice and will answer simple questions and follow simple commands.  Triage vitals notable for initial blood pressure 94/70.  Temperature 97.  O2 sats reported 90% on room air but are running mid 90s on room air on my exam  SKIN: Warm, dry  HENT: Normocephalic, atraumatic  EYES: no scleral icterus  CV: regular rhythm, regular rate-no murmur  RESPIRATORY: normal effort, lungs clear  ABDOMEN: soft, nontender, nondistended  MUSCULOSKELETAL: no deformity  NEURO: Somnolent, oriented x 1 follows commands  Strength-mild generalized weakness without focal deficit  Sensation-grossly intact  Speech patient confused but can answer some simple questions and follow simple  commands-I note no significant dysarthria or aphasia      LAB RESULTS  Recent Results (from the past 24 hour(s))   Lactic Acid, Plasma    Collection Time: 09/02/24 11:25 PM    Specimen: Arm, Right; Blood   Result Value Ref Range    Lactate 4.6 (C) 0.5 - 2.0 mmol/L   STAT Lactic Acid, Reflex    Collection Time: 09/03/24  3:33 AM    Specimen: Blood   Result Value Ref Range    Lactate 4.3 (C) 0.5 - 2.0 mmol/L   Basic Metabolic Panel    Collection Time: 09/03/24  3:33 AM    Specimen: Blood   Result Value Ref Range    Glucose 113 (H) 65 - 99 mg/dL    BUN 38 (H) 8 - 23 mg/dL    Creatinine 3.05 (H) 0.76 - 1.27 mg/dL    Sodium 134 (L) 136 - 145 mmol/L    Potassium 4.4 3.5 - 5.2 mmol/L    Chloride 98 98 - 107 mmol/L    CO2 18.3 (L) 22.0 - 29.0 mmol/L    Calcium 7.9 (L) 8.6 - 10.5 mg/dL    BUN/Creatinine Ratio 12.5 7.0 - 25.0    Anion Gap 17.7 (H) 5.0 - 15.0 mmol/L    eGFR 19.2 (L) >60.0 mL/min/1.73   CBC (No Diff)    Collection Time: 09/03/24  3:33 AM    Specimen: Blood   Result Value Ref Range    WBC 23.05 (H) 3.40 - 10.80 10*3/mm3    RBC 3.43 (L) 4.14 - 5.80 10*6/mm3    Hemoglobin 9.8 (L) 13.0 - 17.7 g/dL    Hematocrit 29.8 (L) 37.5 - 51.0 %    MCV 86.9 79.0 - 97.0 fL    MCH 28.6 26.6 - 33.0 pg    MCHC 32.9 31.5 - 35.7 g/dL    RDW 14.9 12.3 - 15.4 %    RDW-SD 46.6 37.0 - 54.0 fl    MPV 9.4 6.0 - 12.0 fL    Platelets 183 140 - 450 10*3/mm3   STAT Lactic Acid, Reflex    Collection Time: 09/03/24  6:40 AM    Specimen: Blood   Result Value Ref Range    Lactate 2.7 (C) 0.5 - 2.0 mmol/L   STAT Lactic Acid, Reflex    Collection Time: 09/03/24 10:02 AM    Specimen: Blood   Result Value Ref Range    Lactate 2.8 (C) 0.5 - 2.0 mmol/L   STAT Lactic Acid, Reflex    Collection Time: 09/03/24  1:54 PM    Specimen: Blood   Result Value Ref Range    Lactate 2.2 (C) 0.5 - 2.0 mmol/L   Clostridioides difficile Toxin, PCR - Stool, Per Rectum    Collection Time: 09/03/24  4:43 PM    Specimen: Per Rectum; Stool   Result Value Ref Range     Toxigenic C. difficile by PCR Negative Negative   MRSA Screen, PCR (Inpatient) - Swab, Nares    Collection Time: 09/03/24  6:54 PM    Specimen: Nares; Swab   Result Value Ref Range    MRSA PCR No MRSA Detected No MRSA Detected   STAT Lactic Acid, Reflex    Collection Time: 09/03/24  8:20 PM    Specimen: Blood   Result Value Ref Range    Lactate 2.5 (C) 0.5 - 2.0 mmol/L         RADIOLOGY  CT Abdomen Pelvis Without Contrast    Result Date: 9/3/2024  CT OF THE ABDOMEN PELVIS WITHOUT CONTRAST  HISTORY: Sepsis  COMPARISON: None available  TECHNIQUE: Axial CT imaging was obtained through the abdomen and pelvis. No IV contrast was administered.  FINDINGS: Images through the lung bases demonstrate chronic fibrotic changes, there is some more focal airspace consolidation noted at both lung bases. I'm uncertain if this is further chronic scarring, or a superimposed infiltrate, in the absence of any prior studies for comparison. There are coronary artery calcifications. There are dystrophic calcifications of the aortic valve annulus. No suspicious hepatic lesions are seen. The gallbladder, adrenal glands, and spleen are normal. There are some pancreatic parenchymal calcifications, which may reflect changes of chronic pancreatitis. Stomach and duodenum appear mildly distended and fluid-filled. Right kidney is absent. No hydronephrosis is seen on the left. There is a simple appearing left renal cyst. No additional follow-up is necessary. There are extensive aortoiliac calcifications. Urinary bladder is thick walled, and correlation with urinalysis and urine cultures is recommended. Prostate gland contains dystrophic calcifications. Rectal vault is distended with stool, suggesting impaction. There is colonic diverticulosis. There is no bowel obstruction. There is no evidence of appendicitis. There is a fat-containing umbilical hernia. There is some perinephric and periureteral stranding on the left, which may reflect ascending  urinary tract infection. No acute osseous abnormalities are seen. There is lumbar scoliosis, with convexity to the left.       1.The patient's stomach and duodenum appear somewhat patulous, which may reflect gastroenteritis. 2. Fibrotic changes are noted at the lung bases bilaterally. There are patchy areas of airspace consolidation noted at the lung bases. It is impossible to know if the patient has any superimposed infiltrates, in the absence of any prior studies for comparison. 3. Increased stool burden within the rectal vault, suggesting fecal impaction. There is no evidence of proctitis. 4. Walled appearance to the urinary bladder, along with some perinephric and periureteral stranding on the left, which may reflect cystitis and ascending urinary tract infection.  Radiation dose reduction techniques were utilized, including automated exposure control and exposure modulation based on body size.   This report was finalized on 9/3/2024 1:03 AM by Dr. Jessie Oviedo M.D on Workstation: [email or web address]      XR Chest 1 View    Result Date: 9/2/2024  SINGLE VIEW OF THE CHEST  HISTORY: Sepsis  COMPARISON: None available.  FINDINGS: There is cardiomegaly. Lung volumes appear diminished, with diffuse interstitial prominence. Appearance suggests underlying chronic interstitial lung disease and pulmonary fibrosis. Prior report did describe chronic interstitial changes. Cannot exclude infiltrate within the left lung, in the absence of any prior studies for comparison. No pneumothorax or large effusion is seen. There are advanced degenerative changes of the left shoulder.      Overall diminished lung volumes, with diffuse interstitial prominence, suggesting underlying chronic interstitial lung disease and pulmonary fibrosis. I cannot exclude some infiltrate at the left lung base, in the absence of any prior studies for comparison.  This report was finalized on 9/2/2024 11:59 PM by Dr. Jessie Oviedo M.D on Workstation:  [id number]         MEDICATIONS GIVEN IN ER  Medications   sodium chloride 0.9 % flush 10 mL (10 mL Intravenous Given 9/3/24 2100)   sodium chloride 0.9 % flush 10 mL (has no administration in time range)   sodium chloride 0.9 % infusion 40 mL (has no administration in time range)   nitroglycerin (NITROSTAT) SL tablet 0.4 mg (has no administration in time range)   acetaminophen (TYLENOL) tablet 650 mg (has no administration in time range)     Or   acetaminophen (TYLENOL) 160 MG/5ML oral solution 650 mg (has no administration in time range)     Or   acetaminophen (TYLENOL) suppository 650 mg (has no administration in time range)   sennosides-docusate (PERICOLACE) 8.6-50 MG per tablet 2 tablet (has no administration in time range)     And   polyethylene glycol (MIRALAX) packet 17 g (has no administration in time range)     And   bisacodyl (DULCOLAX) EC tablet 5 mg (has no administration in time range)     And   bisacodyl (DULCOLAX) suppository 10 mg (has no administration in time range)   ondansetron ODT (ZOFRAN-ODT) disintegrating tablet 4 mg (has no administration in time range)     Or   ondansetron (ZOFRAN) injection 4 mg (has no administration in time range)   calcium carbonate (TUMS) chewable tablet 500 mg (200 mg elemental) (has no administration in time range)   cefTRIAXone (ROCEPHIN) 2,000 mg in sodium chloride 0.9 % 100 mL MBP (has no administration in time range)   ipratropium-albuterol (DUO-NEB) nebulizer solution 3 mL (3 mL Nebulization Given 9/3/24 2109)   aspirin EC tablet 81 mg (81 mg Oral Given 9/3/24 1332)   atorvastatin (LIPITOR) tablet 10 mg (10 mg Oral Given 9/3/24 1332)   carbidopa-levodopa CR (SINEMET CR)  MG per CR tablet 2 tablet (2 tablets Oral Given 9/3/24 1332)   docusate sodium (COLACE) capsule 100 mg (100 mg Oral Not Given 9/3/24 1309)   donepezil (ARICEPT) tablet 10 mg (10 mg Oral Given 9/3/24 1332)   fluticasone (FLONASE) 50 MCG/ACT nasal spray 2 spray (2 sprays Nasal Given 9/3/24  1331)   guaiFENesin (MUCINEX) 12 hr tablet 600 mg (600 mg Oral Given 9/3/24 2059)   memantine (NAMENDA) tablet 10 mg (10 mg Oral Given 9/3/24 1332)   modafinil (PROVIGIL) tablet 400 mg (400 mg Oral Given 9/3/24 1801)   probenecid (BENEMID) tablet 500 mg (500 mg Oral Given 9/3/24 1331)   tamsulosin (FLOMAX) 24 hr capsule 0.4 mg (0.4 mg Oral Given 9/3/24 1332)   predniSONE (DELTASONE) tablet 12.5 mg (12.5 mg Oral Given 9/3/24 1332)   Pharmacy to dose vancomycin (has no administration in time range)   Vancomycin Pharmacy Intermittent/Pulse Dosing ( Does not apply Canceled Entry 9/3/24 1333)   cadexomer iodine (IODOSORB) 0.9 % gel 1 Application (has no administration in time range)   sepsis fluid NS 0.9 % bolus 3,480 mL (3,480 mL Intravenous New Bag 9/2/24 2137)   cefTRIAXone (ROCEPHIN) 2,000 mg in sodium chloride 0.9 % 100 mL MBP (2,000 mg Intravenous New Bag 9/2/24 2343)   vancomycin 2250 mg/500 mL 0.9% NS IVPB ([facility]) (2,250 mg Intravenous New Bag 9/3/24 1544)         ORDERS PLACED DURING THIS VISIT:  Orders Placed This Encounter   Procedures    Blood Culture - Blood,    Blood Culture - Blood,    Urine Culture - Urine,    Clostridioides difficile Toxin - Stool, Per Rectum    Clostridioides difficile Toxin, PCR - Stool, Per Rectum    MRSA Screen, PCR (Inpatient) - Swab, Nares    CT Head Without Contrast    CT Abdomen Pelvis Without Contrast    XR Chest 1 View    Comprehensive Metabolic Panel    Protime-INR    Urinalysis With Microscopic If Indicated (No Culture) - Urine, Clean Catch    Ammonia    Magnesium    TSH    Procalcitonin    Urine Drug Screen - Urine, Clean Catch    Ethanol    CBC Auto Differential    Lactic Acid, Plasma    Urinalysis With Culture If Indicated - Straight Cath    Urinalysis, Microscopic Only - Urine, Clean Catch    STAT Lactic Acid, Reflex    Basic Metabolic Panel    CBC (No Diff)    STAT Lactic Acid, Reflex    STAT Lactic Acid, Reflex    STAT Lactic Acid, Reflex    CBC (No Diff)    Basic  Metabolic Panel    Vancomycin, Random    STAT Lactic Acid, Reflex    Diet: Cardiac; Healthy Heart (2-3 Na+); Fluid Consistency: Thin (IDDSI 0)    Vital Signs    Intake & Output    Weigh Patient    Oral Care    Place Sequential Compression Device    Maintain Sequential Compression Device    Maintain IV Access    Telemetry - Place Orders & Notify Provider of Results When Patient Experiences Acute Chest Pain, Dysrhythmia or Respiratory Distress    May Be Off Telemetry for Tests    Wound Care    Wound Care    Code Status and Medical Interventions: CPR (Attempt to Resuscitate); Full Support    [facility] (on-call MD unless specified) Details    IP Palliative Care Nurse Consult    Inpatient Nephrology Consult    Patient Isolation Contact Spore    PT Consult: Eval & Treat Functional Mobility Below Baseline    Telemetry Scan    Wound Ostomy Eval & Treat    Insert Peripheral IV    Inpatient Admission    CBC & Differential         OUTPATIENT MEDICATION MANAGEMENT:  Current Facility-Administered Medications Ordered in Epic   Medication Dose Route Frequency Provider Last Rate Last Admin    acetaminophen (TYLENOL) tablet 650 mg  650 mg Oral Q4H PRN Miranda Oconnor APRN        Or    acetaminophen (TYLENOL) 160 MG/5ML oral solution 650 mg  650 mg Oral Q4H PRN Miranda Oconnor APRN        Or    acetaminophen (TYLENOL) suppository 650 mg  650 mg Rectal Q4H PRN Miranda Oconnor APRN        aspirin EC tablet 81 mg  81 mg Oral Daily Mateo Lee MD   81 mg at 09/03/24 1332    atorvastatin (LIPITOR) tablet 10 mg  10 mg Oral Daily Mateo Lee MD   10 mg at 09/03/24 1332    sennosides-docusate (PERICOLACE) 8.6-50 MG per tablet 2 tablet  2 tablet Oral BID PRN Miranda Oconnor APRN        And    polyethylene glycol (MIRALAX) packet 17 g  17 g Oral Daily PRN Miranda Oconnor APRN        And    bisacodyl (DULCOLAX) EC tablet 5 mg  5 mg Oral Daily PRN Miranda Oconnor APRN        And     bisacodyl (DULCOLAX) suppository 10 mg  10 mg Rectal Daily PRN Miranda Oconnor APRN        cadexomer iodine (IODOSORB) 0.9 % gel 1 Application  1 Application Topical Daily PRN Mateo Lee MD        calcium carbonate (TUMS) chewable tablet 500 mg (200 mg elemental)  2 tablet Oral BID PRN Miranda Oconnor APRN        carbidopa-levodopa CR (SINEMET CR)  MG per CR tablet 2 tablet  2 tablet Oral Daily Mateo Lee MD   2 tablet at 09/03/24 1332    cefTRIAXone (ROCEPHIN) 2,000 mg in sodium chloride 0.9 % 100 mL MBP  2,000 mg Intravenous Q24H Mateo Lee MD        docusate sodium (COLACE) capsule 100 mg  100 mg Oral Daily Mateo Lee MD        donepezil (ARICEPT) tablet 10 mg  10 mg Oral Daily Mateo Lee MD   10 mg at 09/03/24 1332    fluticasone (FLONASE) 50 MCG/ACT nasal spray 2 spray  2 spray Nasal Daily Mateo Lee MD   2 spray at 09/03/24 1331    guaiFENesin (MUCINEX) 12 hr tablet 600 mg  600 mg Oral BID Mateo Lee MD   600 mg at 09/03/24 2059    ipratropium-albuterol (DUO-NEB) nebulizer solution 3 mL  3 mL Nebulization 4x Daily - RT Mateo Lee MD   3 mL at 09/03/24 2109    memantine (NAMENDA) tablet 10 mg  10 mg Oral Daily Mateo Lee MD   10 mg at 09/03/24 1332    modafinil (PROVIGIL) tablet 400 mg  400 mg Oral Daily Mateo Lee MD   400 mg at 09/03/24 1801    nitroglycerin (NITROSTAT) SL tablet 0.4 mg  0.4 mg Sublingual Q5 Min PRN Miranda Oconnor APRN        ondansetron ODT (ZOFRAN-ODT) disintegrating tablet 4 mg  4 mg Oral Q6H PRN Miranda Oconnor APRN        Or    ondansetron (ZOFRAN) injection 4 mg  4 mg Intravenous Q6H PRN Miranda Oconnor APRN        Pharmacy to dose vancomycin   Does not apply Continuous PRN Mateo Lee MD        predniSONE (DELTASONE) tablet 12.5 mg  12.5 mg Oral Daily Mateo Lee,  MD   12.5 mg at 09/03/24 1332    probenecid (BENEMID) tablet 500 mg  500 mg Oral BID Mateo Lee MD   500 mg at 09/03/24 1331    sodium chloride 0.9 % flush 10 mL  10 mL Intravenous Q12H Miranda Oconnor APRN   10 mL at 09/03/24 2100    sodium chloride 0.9 % flush 10 mL  10 mL Intravenous PRN Miranda Oconnor APRN        sodium chloride 0.9 % infusion 40 mL  40 mL Intravenous PRN Miranda Oconnor APRN        tamsulosin (FLOMAX) 24 hr capsule 0.4 mg  0.4 mg Oral BID Mateo Lee MD   0.4 mg at 09/03/24 1332    Vancomycin Pharmacy Intermittent/Pulse Dosing   Does not apply Daily Mateo Lee MD         No current [facility]-ordered outpatient medications on file.         PROCEDURES  Procedures            PROGRESS, DATA ANALYSIS, CONSULTS, AND MEDICAL DECISION MAKING  All labs have been independently interpreted by me.  All radiology studies have been reviewed by me. All EKG's have been independently viewed and interpreted by me.  Discussion below represents my analysis of pertinent findings related to patient's condition, differential diagnosis, treatment plan and final disposition.    Differential diagnosis includes but is not limited to infection, dehydration, electrolyte disturbance, worsening of Parkinson's disease, traumatic injury, medication reaction.      ED Course as of 09/03/24 2253   Mon Sep 02, 2024   2119 Patient with hypotension, initial pressures in the 90s systolic with repeat in the 70s.  Will give 30/kg IV fluid bolus.    Patient obtunded and acting encephalopathic.  I do not think stroke is likely but will CT head to exclude intracranial hemorrhage.  Patient not tPA candidate as stroke is not the most likely cause of patient's symptoms. [DB]   2121 EKG independently interpreted by me    Time 8:48 PM  Normal P waves, prolonged MA interval  QRS-LVH, left axis deviation  ST, T waves-nonspecific changes  No prior to compare [DB]   2226 CT scan of the  brain shows significant atrophy, no obvious hemorrhage or stroke. [DB]   2226 White blood count is pretty significantly elevated at 25.8 consistent with likely underlying infection.  Suspect most likely etiology infection is urinary tract.  Will go ahead and give IV Rocephin. [DB]   2250 Blood pressure is improved after 1 L of IV fluids with most recent reading of 108/61.  Pulse in the 80s.    Urinalysis just obtained after in and out cath was quite cloudy looking and highly suggestive of UTI.  I did go ahead and start Rocephin for presumed urinary tract infection. [DB]   2257 Patient pretty significantly dehydrated with BUN/creatinine of 38 and 3.1.  Potassium is 5.4 suggestive of hyperkalemia.    There is also metabolic acidosis with a bicarb of 19. [DB]   2317 Discussed with Dr. Barrett, [facility] hospitalist, who agrees to admit. [JR]   2357 Chest x-ray independently interpreted in PACS.  There appears to be patchy infiltrate present bilaterally suggestive of multifocal pneumonia. [JR]      ED Course User Index  [DB] Niles Rose MD  [JR] Madhu Kaminski MD             AS OF 22:53 EDT VITALS:    BP - 143/70  HR - 76  TEMP - 98.4 °F (36.9 °C) (Oral)  O2 SATS - 95%    COMPLEXITY OF CARE  84-year-old male with history of Parkinson disease presents with confusion and low blood pressure.    Please see ED course above my independent valuation or potation of ED testing as noted above.    Testing notable for markedly elevated BUN/creatinine of 38 and 3.1 as well as some hyperkalemia.  There is significant leukocytosis.    CT imaging independently interpreted by me did show some perinephric stranding consistent with upper urinary tract infection.  Patient treated with IV fluids and IV antibiotics.    Patient mid to the hospital for further evaluation treatment.  DIAGNOSIS  Final diagnoses:   Sepsis, due to unspecified organism, unspecified whether acute organ dysfunction present   Acute kidney injury   Acute  encephalopathy   Multifocal pneumonia         DISPOSITION  ED Disposition       ED Disposition   Decision to Admit    Condition   --    Comment   Level of Care: Telemetry [5]   Diagnosis: Sepsis [[id number]]   Admitting Physician: ABBY DE OLIVEIRA [[id number]]   Attending Physician: ABBY DE OLIVEIRA [[id number]]   Certification: I Certify That Inpatient Hospital Services Are Medically Necessary For Greater Than 2 Midnights                  Please note that portions of this document were completed with a voice recognition program.    Note Disclaimer: At [facility], we believe that sharing information builds trust and better relationships. You are receiving this note because you recently visited [facility]. It is possible you will see health information before a provider has talked with you about it. This kind of information can be easy to misunderstand. To help you fully understand what it means for your health, we urge you to discuss this note with your provider.         Niles Rose MD  09/03/24 4249

## 2024-09-03 NOTE — PLAN OF CARE
Goal Outcome Evaluation:  Plan of Care Reviewed With: patient        Progress: no change       Patient oriented to self. Has made multiples attempts to exit bed and remove telemetry leads. These behaviors usually surrounded a need to use the restroom. VSS.

## 2024-09-03 NOTE — NURSING NOTE
Wound/Ostomy: We see patient at floor nurse's request regarding skin issue on left foot, with hx of old non-healing wound. Upon assessment is observed open wound with calloused appearance, wound bed red, moist, poor drainage. According to the nurse he has follow up by out patient clinic, using collagen, with which he is better, we will continue that as well (Puracol Plus ordered).  Sacrococcygeal and bilateral Heesl are with intact skin and normal coloration, protective padding should be used to facilitate cushioning, they must remain off-loaded at all the time.  Please re-consult for any additional needs.

## 2024-09-04 LAB
ANION GAP SERPL CALCULATED.3IONS-SCNC: 15.8 MMOL/L (ref 5–15)
BACTERIA SPEC AEROBE CULT: NO GROWTH
BUN SERPL-MCNC: 37 MG/DL (ref 8–23)
BUN/CREAT SERPL: 16.7 (ref 7–25)
CALCIUM SPEC-SCNC: 8 MG/DL (ref 8.6–10.5)
CHLORIDE SERPL-SCNC: 104 MMOL/L (ref 98–107)
CO2 SERPL-SCNC: 18.2 MMOL/L (ref 22–29)
CREAT SERPL-MCNC: 2.22 MG/DL (ref 0.76–1.27)
D-LACTATE SERPL-SCNC: 1.5 MMOL/L (ref 0.5–2)
DEPRECATED RDW RBC AUTO: 47.8 FL (ref 37–54)
EGFRCR SERPLBLD CKD-EPI 2021: 28.1 ML/MIN/1.73
ERYTHROCYTE [DISTWIDTH] IN BLOOD BY AUTOMATED COUNT: 15 % (ref 12.3–15.4)
GLUCOSE SERPL-MCNC: 113 MG/DL (ref 65–99)
HCT VFR BLD AUTO: 27.9 % (ref 37.5–51)
HGB BLD-MCNC: 9.2 G/DL (ref 13–17.7)
MCH RBC QN AUTO: 28.8 PG (ref 26.6–33)
MCHC RBC AUTO-ENTMCNC: 33 G/DL (ref 31.5–35.7)
MCV RBC AUTO: 87.2 FL (ref 79–97)
PLATELET # BLD AUTO: 188 10*3/MM3 (ref 140–450)
PMV BLD AUTO: 10 FL (ref 6–12)
POTASSIUM SERPL-SCNC: 3.8 MMOL/L (ref 3.5–5.2)
RBC # BLD AUTO: 3.2 10*6/MM3 (ref 4.14–5.8)
SODIUM SERPL-SCNC: 138 MMOL/L (ref 136–145)
VANCOMYCIN SERPL-MCNC: 13.2 MCG/ML (ref 5–40)
WBC NRBC COR # BLD AUTO: 17.19 10*3/MM3 (ref 3.4–10.8)

## 2024-09-04 PROCEDURE — 25010000002 CEFTRIAXONE PER 250 MG: Performed by: HOSPITALIST

## 2024-09-04 PROCEDURE — 25010000002 VANCOMYCIN 1 G RECONSTITUTED SOLUTION 1 EACH VIAL: Performed by: HOSPITALIST

## 2024-09-04 PROCEDURE — 94761 N-INVAS EAR/PLS OXIMETRY MLT: CPT

## 2024-09-04 PROCEDURE — 80048 BASIC METABOLIC PNL TOTAL CA: CPT | Performed by: HOSPITALIST

## 2024-09-04 PROCEDURE — 80202 ASSAY OF VANCOMYCIN: CPT | Performed by: HOSPITALIST

## 2024-09-04 PROCEDURE — 25810000003 SODIUM CHLORIDE 0.9 % SOLUTION 250 ML FLEX CONT: Performed by: HOSPITALIST

## 2024-09-04 PROCEDURE — 63710000001 PREDNISONE PER 5 MG: Performed by: HOSPITALIST

## 2024-09-04 PROCEDURE — 83605 ASSAY OF LACTIC ACID: CPT | Performed by: NURSE PRACTITIONER

## 2024-09-04 PROCEDURE — 94799 UNLISTED PULMONARY SVC/PX: CPT

## 2024-09-04 PROCEDURE — 94664 DEMO&/EVAL PT USE INHALER: CPT

## 2024-09-04 PROCEDURE — 85027 COMPLETE CBC AUTOMATED: CPT | Performed by: HOSPITALIST

## 2024-09-04 PROCEDURE — 97530 THERAPEUTIC ACTIVITIES: CPT

## 2024-09-04 PROCEDURE — 99223 1ST HOSP IP/OBS HIGH 75: CPT | Performed by: NURSE PRACTITIONER

## 2024-09-04 PROCEDURE — 97162 PT EVAL MOD COMPLEX 30 MIN: CPT

## 2024-09-04 PROCEDURE — 99497 ADVNCD CARE PLAN 30 MIN: CPT | Performed by: NURSE PRACTITIONER

## 2024-09-04 RX ADMIN — IPRATROPIUM BROMIDE AND ALBUTEROL SULFATE 3 ML: .5; 3 SOLUTION RESPIRATORY (INHALATION) at 19:21

## 2024-09-04 RX ADMIN — IPRATROPIUM BROMIDE AND ALBUTEROL SULFATE 3 ML: .5; 3 SOLUTION RESPIRATORY (INHALATION) at 14:43

## 2024-09-04 RX ADMIN — IPRATROPIUM BROMIDE AND ALBUTEROL SULFATE 3 ML: .5; 3 SOLUTION RESPIRATORY (INHALATION) at 10:32

## 2024-09-04 RX ADMIN — GUAIFENESIN 600 MG: 600 TABLET, EXTENDED RELEASE ORAL at 20:59

## 2024-09-04 RX ADMIN — MODAFINIL 400 MG: 100 TABLET ORAL at 10:01

## 2024-09-04 RX ADMIN — Medication 10 ML: at 20:59

## 2024-09-04 RX ADMIN — IPRATROPIUM BROMIDE AND ALBUTEROL SULFATE 3 ML: .5; 3 SOLUTION RESPIRATORY (INHALATION) at 07:40

## 2024-09-04 RX ADMIN — ATORVASTATIN CALCIUM 10 MG: 20 TABLET, FILM COATED ORAL at 10:02

## 2024-09-04 RX ADMIN — ASPIRIN 81 MG: 81 TABLET, COATED ORAL at 10:00

## 2024-09-04 RX ADMIN — CARBIDOPA AND LEVODOPA 2 TABLET: 50; 200 TABLET, EXTENDED RELEASE ORAL at 10:28

## 2024-09-04 RX ADMIN — DONEPEZIL HYDROCHLORIDE 10 MG: 10 TABLET, FILM COATED ORAL at 10:00

## 2024-09-04 RX ADMIN — TAMSULOSIN HYDROCHLORIDE 0.4 MG: 0.4 CAPSULE ORAL at 20:59

## 2024-09-04 RX ADMIN — VANCOMYCIN HYDROCHLORIDE 1000 MG: 1 INJECTION, POWDER, LYOPHILIZED, FOR SOLUTION INTRAVENOUS at 10:30

## 2024-09-04 RX ADMIN — GUAIFENESIN 600 MG: 600 TABLET, EXTENDED RELEASE ORAL at 10:00

## 2024-09-04 RX ADMIN — MEMANTINE HYDROCHLORIDE 10 MG: 10 TABLET, FILM COATED ORAL at 10:01

## 2024-09-04 RX ADMIN — CEFTRIAXONE 2000 MG: 2 INJECTION, POWDER, FOR SOLUTION INTRAMUSCULAR; INTRAVENOUS at 22:45

## 2024-09-04 RX ADMIN — TAMSULOSIN HYDROCHLORIDE 0.4 MG: 0.4 CAPSULE ORAL at 10:01

## 2024-09-04 RX ADMIN — PREDNISONE 12.5 MG: 10 TABLET ORAL at 10:00

## 2024-09-04 RX ADMIN — PROBENECID 500 MG: 500 TABLET, FILM COATED ORAL at 10:01

## 2024-09-04 RX ADMIN — Medication 10 ML: at 10:30

## 2024-09-04 NOTE — PLAN OF CARE
Goal Outcome Evaluation:  Plan of Care Reviewed With: patient, spouse           Outcome Evaluation: PT VSS--resting in bed most of day. Pt with no complaints of pain--wife and caregiver Ashlee at bedside. Pt AOx1--still with confusion to place, time, and situation. Caregiver stated that pt is pretty much at his baseline. MRI of foot ordered--wife working on screening form--will send down when she is done. Doppler also ordered for pt. Family brought pt CPAP machine from home--alerted RT that it is at bedside. Pt resting comfortably at this time. No current issues.

## 2024-09-04 NOTE — PLAN OF CARE
Problem: Adult Inpatient Plan of Care  Goal: Plan of Care Review  Outcome: Ongoing, Progressing  Flowsheets (Taken 9/4/2024 0609)  Progress: improving  Plan of Care Reviewed With:   patient   family  Goal: Patient-Specific Goal (Individualized)  Outcome: Ongoing, Progressing  Goal: Absence of Hospital-Acquired Illness or Injury  Outcome: Ongoing, Progressing  Intervention: Identify and Manage Fall Risk  Recent Flowsheet Documentation  Taken 9/4/2024 0608 by Teagan Cruz RN  Safety Promotion/Fall Prevention:   safety round/check completed   room organization consistent   nonskid shoes/slippers when out of bed  Taken 9/4/2024 0429 by Teagan Cruz RN  Safety Promotion/Fall Prevention:   safety round/check completed   room organization consistent   nonskid shoes/slippers when out of bed   fall prevention program maintained  Taken 9/4/2024 0204 by Teagan Cruz RN  Safety Promotion/Fall Prevention:   safety round/check completed   room organization consistent   nonskid shoes/slippers when out of bed   fall prevention program maintained  Taken 9/4/2024 0023 by Teagan Cruz RN  Safety Promotion/Fall Prevention:   safety round/check completed   room organization consistent   nonskid shoes/slippers when out of bed   fall prevention program maintained  Taken 9/3/2024 2200 by Teagan Cruz RN  Safety Promotion/Fall Prevention:   safety round/check completed   room organization consistent   nonskid shoes/slippers when out of bed   fall prevention program maintained  Taken 9/3/2024 2055 by Teagan Cruz RN  Safety Promotion/Fall Prevention:   safety round/check completed   room organization consistent   nonskid shoes/slippers when out of bed   fall prevention program maintained  Intervention: Prevent Skin Injury  Recent Flowsheet Documentation  Taken 9/4/2024 0608 by Teagan Cruz, RN  Body Position:   right   tilted  Taken 9/4/2024 0429 by Teagan Cruz, MURRAY  Body  Position:   right   tilted  Taken 9/4/2024 0204 by Teagan Cruz RN  Body Position:   left   tilted  Taken 9/4/2024 0023 by Teagan Cruz RN  Body Position:   right   tilted  Skin Protection: adhesive use limited  Taken 9/3/2024 2200 by Teagan Cruz RN  Body Position: supine, legs elevated  Taken 9/3/2024 2055 by Teagan Cruz RN  Body Position: sitting up in bed  Skin Protection: adhesive use limited  Intervention: Prevent and Manage VTE (Venous Thromboembolism) Risk  Recent Flowsheet Documentation  Taken 9/4/2024 0608 by Teagan Cruz RN  Activity Management: activity encouraged  Taken 9/4/2024 0023 by Teagan Cruz RN  Activity Management: activity encouraged  Range of Motion: active ROM (range of motion) encouraged  Taken 9/3/2024 2055 by Teagan Cruz RN  Activity Management: activity encouraged  Range of Motion: active ROM (range of motion) encouraged  Intervention: Prevent Infection  Recent Flowsheet Documentation  Taken 9/4/2024 0023 by Teagan Cruz RN  Infection Prevention:   single patient room provided   rest/sleep promoted   hand hygiene promoted  Taken 9/3/2024 2055 by Teagan Cruz RN  Infection Prevention:   single patient room provided   rest/sleep promoted   hand hygiene promoted  Goal: Optimal Comfort and Wellbeing  Outcome: Ongoing, Progressing  Intervention: Provide Person-Centered Care  Recent Flowsheet Documentation  Taken 9/4/2024 0023 by Teagan Cruz RN  Trust Relationship/Rapport: care explained  Taken 9/3/2024 2055 by Teagan Cruz RN  Trust Relationship/Rapport:   care explained   choices provided   questions answered   questions encouraged  Goal: Readiness for Transition of Care  Outcome: Ongoing, Progressing     Problem: Fall Injury Risk  Goal: Absence of Fall and Fall-Related Injury  Outcome: Ongoing, Progressing  Intervention: Identify and Manage Contributors  Recent Flowsheet Documentation  Taken 9/4/2024  0023 by Teagan Cruz, RN  Self-Care Promotion:   independence encouraged   BADL personal objects within reach  Taken 9/3/2024 2055 by Teagan Cruz, RN  Medication Review/Management: medications reviewed  Self-Care Promotion:   independence encouraged   BADL personal objects within reach  Intervention: Promote Injury-Free Environment  Recent Flowsheet Documentation  Taken 9/4/2024 0608 by Teagan Cruz, RN  Safety Promotion/Fall Prevention:   safety round/check completed   room organization consistent   nonskid shoes/slippers when out of bed  Taken 9/4/2024 0429 by Teagan Cruz, RN  Safety Promotion/Fall Prevention:   safety round/check completed   room organization consistent   nonskid shoes/slippers when out of bed   fall prevention program maintained  Taken 9/4/2024 0204 by Teagan Cruz RN  Safety Promotion/Fall Prevention:   safety round/check completed   room organization consistent   nonskid shoes/slippers when out of bed   fall prevention program maintained  Taken 9/4/2024 0023 by Teagan Cruz RN  Safety Promotion/Fall Prevention:   safety round/check completed   room organization consistent   nonskid shoes/slippers when out of bed   fall prevention program maintained  Taken 9/3/2024 2200 by Teagan Cruz RN  Safety Promotion/Fall Prevention:   safety round/check completed   room organization consistent   nonskid shoes/slippers when out of bed   fall prevention program maintained  Taken 9/3/2024 2055 by Teagan Cruz RN  Safety Promotion/Fall Prevention:   safety round/check completed   room organization consistent   nonskid shoes/slippers when out of bed   fall prevention program maintained     Problem: Skin Injury Risk Increased  Goal: Skin Health and Integrity  Outcome: Ongoing, Progressing  Intervention: Optimize Skin Protection  Recent Flowsheet Documentation  Taken 9/4/2024 0608 by Teagan Cruz, RN  Head of Bed (HOB) Positioning: HOB at  20-30 degrees  Taken 9/4/2024 0429 by Teagan Cruz RN  Head of Bed (HOB) Positioning: HOB at 20-30 degrees  Taken 9/4/2024 0204 by Teagan Cruz RN  Head of Bed (HOB) Positioning: HOB at 20-30 degrees  Taken 9/4/2024 0023 by Teagan Cruz RN  Pressure Reduction Techniques:   frequent weight shift encouraged   heels elevated off bed  Head of Bed (HOB) Positioning: HOB at 20-30 degrees  Skin Protection: adhesive use limited  Taken 9/3/2024 2200 by Teagan Cruz RN  Head of Bed (HOB) Positioning: HOB elevated  Taken 9/3/2024 2055 by Teagan Cruz RN  Pressure Reduction Techniques:   frequent weight shift encouraged   heels elevated off bed  Head of Bed (HOB) Positioning: HOB elevated  Skin Protection: adhesive use limited     Problem: Adjustment to Illness (Sepsis/Septic Shock)  Goal: Optimal Coping  Outcome: Ongoing, Progressing  Intervention: Optimize Psychosocial Adjustment to Illness  Recent Flowsheet Documentation  Taken 9/4/2024 0023 by Teagan Cruz RN  Supportive Measures: active listening utilized  Family/Support System Care:   self-care encouraged   support provided  Taken 9/3/2024 2055 by Teagan Cruz RN  Supportive Measures: active listening utilized  Family/Support System Care:   self-care encouraged   support provided     Problem: Bleeding (Sepsis/Septic Shock)  Goal: Absence of Bleeding  Outcome: Ongoing, Progressing     Problem: Glycemic Control Impaired (Sepsis/Septic Shock)  Goal: Blood Glucose Level Within Desired Range  Outcome: Ongoing, Progressing     Problem: Infection Progression (Sepsis/Septic Shock)  Goal: Absence of Infection Signs and Symptoms  Outcome: Ongoing, Progressing  Intervention: Initiate Sepsis Management  Recent Flowsheet Documentation  Taken 9/4/2024 0023 by Teagan Cruz RN  Infection Prevention:   single patient room provided   rest/sleep promoted   hand hygiene promoted  Isolation Precautions:   precautions maintained    contact   spore  Taken 9/3/2024 2055 by Teagan Cruz RN  Infection Prevention:   single patient room provided   rest/sleep promoted   hand hygiene promoted  Isolation Precautions:   precautions maintained   contact   spore  Intervention: Promote Recovery  Recent Flowsheet Documentation  Taken 9/4/2024 0608 by Teagan Cruz RN  Activity Management: activity encouraged  Taken 9/4/2024 0023 by Teagan Cruz RN  Activity Management: activity encouraged  Sleep/Rest Enhancement: relaxation techniques promoted  Taken 9/3/2024 2055 by Teagan Cruz RN  Activity Management: activity encouraged  Sleep/Rest Enhancement: relaxation techniques promoted     Problem: Nutrition Impaired (Sepsis/Septic Shock)  Goal: Optimal Nutrition Intake  Outcome: Ongoing, Progressing     Problem: Impaired Wound Healing  Goal: Optimal Wound Healing  Outcome: Ongoing, Progressing  Intervention: Promote Wound Healing  Recent Flowsheet Documentation  Taken 9/4/2024 0608 by Teagan Cruz RN  Activity Management: activity encouraged  Taken 9/4/2024 0023 by Teagan Cruz RN  Activity Management: activity encouraged  Sleep/Rest Enhancement: relaxation techniques promoted  Taken 9/3/2024 2055 by Teagan Cruz RN  Activity Management: activity encouraged  Sleep/Rest Enhancement: relaxation techniques promoted     Problem: Confusion Acute  Goal: Optimal Cognitive Function  Outcome: Ongoing, Progressing  Intervention: Minimize Contributing Factors  Recent Flowsheet Documentation  Taken 9/4/2024 0023 by Teagan Cruz RN  Sensory Stimulation Regulation: quiet environment promoted  Reorientation Measures:   calendar in view   clock in view  Taken 9/3/2024 2055 by Teagan Cruz RN  Sensory Stimulation Regulation: quiet environment promoted  Reorientation Measures:   calendar in view   clock in view   Goal Outcome Evaluation:  Plan of Care Reviewed With: patient, family        Progress: improving

## 2024-09-04 NOTE — CONSULTS
.            [facility]    Palliative Care Initial Consult   Attending Physician: Mateo Lee*  Referring Provider: Dr Kaminski    Reason for Referral: assistance with clarification of goals of care  Family/Support: spouse (Yudy), children ( Jocelyn and Allan) and caregiver (Ashlee)    Code Status and Medical Interventions: CPR (Attempt to Resuscitate); Full Support   Ordered at: 09/03/24 0012     Code Status (Patient has no pulse and is not breathing):    CPR (Attempt to Resuscitate)     Medical Interventions (Patient has pulse or is breathing):    Full Support     Goals of Care: To be treated for acute conditions and return to home setting with caregiver support.     HPI:   84 y.o. male  with history of Alzheimer's dementia, renal cell carcinoma status post nephrectomy,  chronic kidney disease stage IIIB, polymalgia rheumatica on chronic prednisone, Parkinson's disease, interstitial lung disease/UIP,  and benign prostate hyperplasia. He resided at home prior to admission.   Patient presented to [facility] on 9/2/2024 related to altered mental status. Work up in ER, revealed acute kidney injury, significant leukocytosis with concerns for sepsis (possible cellulitis of left lower extremity). He was given IV fluids and started on antibiotic treatment (Vancomycin and Ceftriaxone). A consult was placed for nephrology for acute kidney injury. His fluids were eventually discontinued. He is due to have duplex of lower extremity and MRI of left foot. The palliative care team was consulted for support with goals of care conversation due to co morbid illness and age.   Palliative Care Spoke With: family  Quality of life: fair  Functional Status: Prior to admission, pt was living at home with spouse and independent with household mobility using rwx. Pt had assistance for bathing and ADLs. Upon exam, pt demos generalized weakness, impaired balance, and  decreased endurance limiting mobility. Pt required mod A x2 for bed mobility and mod A x2 for STS transfers. Pt was able to take a few side steps at EOB with mod A x2 and rwx. Pt fatigued quickly and demonstrated increased SOA with activity. Per PT notes on 9/4/2024  Due to the Palliative Care Topics Discussed: palliative care, goals of care, care options, resuscitation status, and discharge options we will establish an advance care plan.   Advance Care Planning   Advance Care Planning Discussion: see below       Review of Systems   Unable to perform ROS: Dementia       1-      Past Medical History:   Diagnosis Date    Alzheimer disease     CKD (chronic kidney disease)     Hyperlipidemia     Interstitial lung disease     Parkinson disease     PMR (polymyalgia rheumatica)     Renal cancer      Past Surgical History:   Procedure Laterality Date    NEPHRECTOMY Right      Social History     Socioeconomic History    Marital status:    Tobacco Use    Smoking status: Unknown   Vaping Use    Vaping status: Never Used   Substance and Sexual Activity    Drug use: Never    Sexual activity: Defer       Current Facility-Administered Medications   Medication Dose Route Frequency Provider Last Rate Last Admin    acetaminophen (TYLENOL) tablet 650 mg  650 mg Oral Q4H PRN Miranda Oconnor APRN        Or    acetaminophen (TYLENOL) 160 MG/5ML oral solution 650 mg  650 mg Oral Q4H PRN Miranda Oconnor APRN        Or    acetaminophen (TYLENOL) suppository 650 mg  650 mg Rectal Q4H PRN Miranda Oconnor APRN        aspirin EC tablet 81 mg  81 mg Oral Daily Mateo Lee MD   81 mg at 09/03/24 1332    atorvastatin (LIPITOR) tablet 10 mg  10 mg Oral Daily Mateo Lee MD   10 mg at 09/03/24 1332    sennosides-docusate (PERICOLACE) 8.6-50 MG per tablet 2 tablet  2 tablet Oral BID PRN Miranda Oconnor APRN        And    polyethylene glycol (MIRALAX) packet 17 g  17 g Oral Daily PRN Elvin  ANGEL Becker        And    bisacodyl (DULCOLAX) EC tablet 5 mg  5 mg Oral Daily PRN Miranda Oconnor APRN        And    bisacodyl (DULCOLAX) suppository 10 mg  10 mg Rectal Daily PRN Miranda Oconnor APRN        cadexomer iodine (IODOSORB) 0.9 % gel 1 Application  1 Application Topical Daily PRN Mateo Lee MD        calcium carbonate (TUMS) chewable tablet 500 mg (200 mg elemental)  2 tablet Oral BID PRN Miranda Oconnor APRN        carbidopa-levodopa CR (SINEMET CR)  MG per CR tablet 2 tablet  2 tablet Oral Daily Mateo Lee MD   2 tablet at 09/03/24 1332    cefTRIAXone (ROCEPHIN) 2,000 mg in sodium chloride 0.9 % 100 mL MBP  2,000 mg Intravenous Q24H Mateo Lee  mL/hr at 09/03/24 2310 2,000 mg at 09/03/24 2310    docusate sodium (COLACE) capsule 100 mg  100 mg Oral Daily Mateo Lee MD        donepezil (ARICEPT) tablet 10 mg  10 mg Oral Daily Mateo Lee MD   10 mg at 09/03/24 1332    fluticasone (FLONASE) 50 MCG/ACT nasal spray 2 spray  2 spray Nasal Daily Mateo Lee MD   2 spray at 09/03/24 1331    guaiFENesin (MUCINEX) 12 hr tablet 600 mg  600 mg Oral BID Mateo Lee MD   600 mg at 09/03/24 2059    ipratropium-albuterol (DUO-NEB) nebulizer solution 3 mL  3 mL Nebulization 4x Daily - RT Mateo Lee MD   3 mL at 09/04/24 0740    memantine (NAMENDA) tablet 10 mg  10 mg Oral Daily Mateo Lee MD   10 mg at 09/03/24 1332    modafinil (PROVIGIL) tablet 400 mg  400 mg Oral Daily Mateo Lee MD   400 mg at 09/03/24 1801    nitroglycerin (NITROSTAT) SL tablet 0.4 mg  0.4 mg Sublingual Q5 Min PRN Miranda Oconnor APRN        ondansetron ODT (ZOFRAN-ODT) disintegrating tablet 4 mg  4 mg Oral Q6H PRN Miranda Oconnor APRN        Or    ondansetron (ZOFRAN) injection 4 mg  4 mg Intravenous Q6H PRN Miranda Oconnor APRN         Pharmacy to dose vancomycin   Does not apply Continuous PRN Mateo Lee MD        predniSONE (DELTASONE) tablet 12.5 mg  12.5 mg Oral Daily Mateo Lee MD   12.5 mg at 09/03/24 1332    probenecid (BENEMID) tablet 500 mg  500 mg Oral BID Mateo Lee MD   500 mg at 09/03/24 2313    sodium chloride 0.9 % flush 10 mL  10 mL Intravenous Q12H Miranda Oconnor APRN   10 mL at 09/03/24 2100    sodium chloride 0.9 % flush 10 mL  10 mL Intravenous PRN Miranda Oconnor APRN        sodium chloride 0.9 % infusion 40 mL  40 mL Intravenous PRN Miranda Oconnor APRN        tamsulosin (FLOMAX) 24 hr capsule 0.4 mg  0.4 mg Oral BID Mateo Lee MD   0.4 mg at 09/03/24 2315    vancomycin (VANCOCIN) 1,000 mg in sodium chloride 0.9 % 250 mL IVPB-VTB  1,000 mg Intravenous Once Mateo Lee MD        Vancomycin Pharmacy Intermittent/Pulse Dosing   Does not apply Daily Mateo Lee MD         Pharmacy to dose vancomycin,         acetaminophen **OR** acetaminophen **OR** acetaminophen    senna-docusate sodium **AND** polyethylene glycol **AND** bisacodyl **AND** bisacodyl    cadexomer iodine    calcium carbonate    nitroglycerin    ondansetron ODT **OR** ondansetron    Pharmacy to dose vancomycin    sodium chloride    sodium chloride    Allergies   Allergen Reactions    Augmentin [Amoxicillin-Pot Clavulanate] Rash    Neosporin [Bacitracin-Polymyxin B] Rash     Attest that current medications reviewed  including but not limited to prescriptions, over-the counter, herbals and vitamin/mineral/dietary (nutritional) supplements for name, route of administration, type, dose and frequency and are current using all immediate resources available at time of dictation.      Intake/Output Summary (Last 24 hours) at 9/4/2024 1003  Last data filed at 9/3/2024 2123  Gross per 24 hour   Intake 118 ml   Output 0 ml   Net 118 ml       Physical  "Exam:    Diagnostics: Reviewed  /68 (BP Location: Left arm, Patient Position: Lying)   Pulse 77   Temp 98.1 °F (36.7 °C) (Oral)   Resp 20   Ht 193 cm (76\") Comment: from EMS record in media tab  Wt 114 kg (251 lb 12.3 oz)   SpO2 93%   BMI 30.65 kg/m²     Constitutional:       Appearance: Not in distress. Chronically ill-appearing.      Comments: Elderly    Pulmonary:      Effort: Pulmonary effort is normal.      Breath sounds: Examination of the right-lower field reveals decreased breath sounds. Examination of the left-lower field reveals decreased breath sounds. Decreased breath sounds present.   Cardiovascular:      PMI at left midclavicular line. Normal rate.      Comments: Erythema and warmth to left lower extremity  Edema:     Peripheral edema present.     Pretibial: bilateral 1+ edema of the pretibial area.     Feet: bilateral 1+ edema of the feet.  Abdominal:      General: Bowel sounds are normal.      Palpations: Abdomen is soft.      Tenderness: There is no abdominal tenderness.   Neurological:      Mental Status: Alert. Disoriented.   Psychiatric:         Attention and Perception: Attention and perception normal.         Mood and Affect: Mood and affect normal.         Speech: Speech normal.         Behavior: Behavior is cooperative.         Thought Content: Thought content normal.         Cognition and Memory: Memory is impaired.       Patient status: Disease state: Controlled with current treatments.  Functional status: Palliative Performance Scale Score: Performance 50% based on the following measures: Ambulation: Mainly sit or lie down, Activity and Evidence of Disease: Unable to do any work, extensive evidence of disease, Self-Care: Considerable assistance required,  Intake: Normal or reduced, LOC: Full or confusion   Nutritional status: Albumin 3.3 on 9/2/2024  Body mass index is 30.65 kg/m².    Family support: The patient receives support from his wife, children, and extended " "family..  Advance Directives: Advance Directive Status: Patient has advance directive, copy requested   POA/Healthcare surrogate-daughter, Jocelyn.       Impression/Problem List:    Sepsis      Cellulitis of left lower extremity   Dementia   Parkinson's disease  Chronic kidney disease stage IIIb  Interstitial lung disease  Polymyalgia rheumatica  Rheumatoid arthritis       Nonhealing wound of left foot      Recommendations/Plan:  1. Provide support with goals of care  2. FULL CODE         2.  Palliative care encounter  The patient is unable to participate in goals of care conversation due to dementia. He doesn't have an advance directive on file. His spouse, Yudy was at bedside so I discussed goals of care with her. The patient resides at home and has the support of a caregiver (Ashlee) and his spouse. He does have two children who reside out of state. She has a very good understanding of patients acute and chronic conditions, which we reviewed in detail. He is a retired oncologist and formerly  diagnosed with dementia in 2022. He does require assistance with ADL's  (bathing and dressing). He is able feed self. He does utilize walker at home and able to toilet. The patient does have an advance directive and I have requested a copy. She reports the patient's daughter, Jocelyn is power of  and she is a MD, however they work collectively as a family with decision making. She resides in [place]. I provided her with information regarding palliative care and [facility] and she is NOT interested. She feels they have all the things they need in place. Her goal is for patient to be treated for acute conditions and return to home setting with their caregiver.  She is considering home health (PT). She relies heavily on her caregiver and references her as their \"lifeline.\" I did discuss wishes regarding CPR and ventilator and she wishes for him to be FULL code. I have encouraged further discussion regarding this with her " children and support system. Of note, the patient's caregiver ( Ashlee) arrived and was brought up to date of our conversation. She communicates closely with patient's daughter and will inform her of our conversation. They were both very appreciative of conversation. They did request to talk with Dr Lee regarding patient hospital treatment and potential discharge date and I have reached out to him on their behalf. I spoke with MURRAY Sanderson,       Thank you for this consult and allowing us to participate in patient's plan of care. Palliative Care Team will sign off and see PRN.     Time spent: 80  minutes spent reviewing medical and medication records, assessing and examining patient, discussing with family, answering questions, providing some guidance about a plan and documentation of care, and coordinating care with other healthcare members, with > 50% time spent face to face.   30 minutes spent on advance care planning.    Olena Gould, ANGEL  9/4/2024  10:03 EDT

## 2024-09-04 NOTE — CONSULTS
"  [facility] Consult Note      Patient Name: Allan Kruger  : 1940  MRN: [id number]  Primary Care Physician:  Provider, No Known  Referring Physician: No ref. provider found  Date of admission: 2024    Subjective     Reason for Consult:  JASS on [id number]    HPI:   Allan Kruger is a 84 y.o. male with RFI9h-v (baseline SCr 1.4-1.6), solitary kidney on the right following left nephrectomy for malignancy, dementia, Parkinson's, PMR on prednisone, and interstitial lung disease, admitted  for further evaluation of lethargy and weakness.  Hypotensive (70/50) on arrival, with SCR 3.1.  Full PMH outlined below; notable is chronic wound medial left foot for the past 6 months with continued bloody drainage (though family states that wound has been slowly closing under the care of wound care).  Presumed sepsis, for which empiric antibiotics and IVF started.  SCR this morning 2.2.    Weight roughly the same for the past 6 months  Despite dementia, usually able to dress himself and answer simple questions; several days ago, he became too lethargic to do basic tasks, and became less interactive and verbal  Family describes nausea and vomiting 1 day prior to admission; vomit was described as \"very dark and thick\" (had eaten just a salad the previous evening)  No new urinary complaints; has chronic incontinence and frequency  Chronic constipation that is unchanged  Family not aware of any shortness of breath or chest pain  Daughter at bedside states that when she assessed the patient at home following his night of vomiting, he was pale and clammy    Review of Systems:   Unobtainable from the patient; family members at bedside provide information    Personal History     Past Medical History:   Diagnosis Date    Alzheimer disease     CKD (chronic kidney disease)     Hyperlipidemia     Interstitial lung disease     Parkinson disease     PMR (polymyalgia rheumatica)     Renal cancer        Past " Surgical History:   Procedure Laterality Date    NEPHRECTOMY Right        Family History: family history is not on file.    Social History:  reports that he does not use drugs.    Home Medications:  Prior to Admission medications    Medication Sig Start Date End Date Taking? Authorizing Provider   aspirin 81 MG EC tablet Take 1 tablet by mouth Daily.    Manjula Lanier MD   atorvastatin (LIPITOR) 10 MG tablet Take 1 tablet by mouth Daily.    Manjula Lanier MD   carbidopa-levodopa CR (SINEMET CR)  MG per CR tablet Take 2 tablets by mouth Daily.    Manjula Lanier MD   cholecalciferol (VITAMIN D3) 1.25 MG (03362 UT) capsule Take 5,000 Units by mouth Daily.    Manjula Lanier MD   docusate sodium (COLACE) 100 MG capsule Take 1 capsule by mouth Daily.    Manjula Lanier MD   donepezil (ARICEPT) 10 MG tablet Take 1 tablet by mouth Daily.    Manjula Lanier MD   fluticasone (FLONASE) 50 MCG/ACT nasal spray 2 sprays into the nostril(s) as directed by provider Daily.    Manjula Lanier MD   guaiFENesin (MUCINEX) 600 MG 12 hr tablet Take 1 tablet by mouth 2 (Two) Times a Day.    Manjula Lanier MD   icosapent ethyl (VASCEPA) 1 g capsule capsule Take 2 g by mouth Daily.    Manjula Lanier MD   memantine (NAMENDA) 10 MG tablet Take 1 tablet by mouth Daily.    Manjula Lanier MD   modafinil (PROVIGIL) 100 MG tablet Take 4 tablets by mouth Daily.    Manjula Lanier MD   predniSONE (DELTASONE) 5 MG tablet Take 1 tablet by mouth Daily. Take 12.5 MG daily 8/9/24 11/7/24  Manjula Lanier MD   probenecid (BENEMID) 500 MG tablet Take 1 tablet by mouth 2 (Two) Times a Day.    Manjula Lanier MD   tamsulosin (FLOMAX) 0.4 MG capsule 24 hr capsule Take 1 capsule by mouth 2 (Two) Times a Day.    Manjula Lanier MD       Allergies:  Allergies   Allergen Reactions    Augmentin [Amoxicillin-Pot Clavulanate] Rash    Neosporin [Bacitracin-Polymyxin B]  Rash       Objective     Vitals:   Temp:  [97.7 °F (36.5 °C)-98.4 °F (36.9 °C)] 98.1 °F (36.7 °C)  Heart Rate:  [71-80] 78  Resp:  [18-20] 18  BP: (116-143)/(56-70) 131/68    Intake/Output Summary (Last 24 hours) at 9/4/2024 1227  Last data filed at 9/3/2024 2123  Gross per 24 hour   Intake 118 ml   Output 0 ml   Net 118 ml       Physical Exam:   Constitutional: Awake, alert, NAD, mostly nonverbal, pale, overweight   HEENT: Sclera anicteric, no conjunctival injection  Neck: Supple, bilateral carotid bruits trachea at midline, no JVD  Respiratory: Clear anteriorly, nonlabored respiration  Cardiovascular: RRR, 2/6M, no rub  Gastrointestinal: BS +, soft, nontender, distended  : No palpable bladder  Musculoskeletal: +2-3 edema on left, +2 edema on right, no C/C; left foot wrapped; appears to have Charcot deformity   Psychiatric: Eyes open when abdomen pressed  Neurologic: Pupils are equal  Skin: Warm and dry; erythema left ankle       Scheduled Meds:     aspirin, 81 mg, Oral, Daily  atorvastatin, 10 mg, Oral, Daily  carbidopa-levodopa CR, 2 tablet, Oral, Daily  cefTRIAXone, 2,000 mg, Intravenous, Q24H  donepezil, 10 mg, Oral, Daily  fluticasone, 2 spray, Nasal, Daily  guaiFENesin, 600 mg, Oral, BID  ipratropium-albuterol, 3 mL, Nebulization, 4x Daily - RT  memantine, 10 mg, Oral, Daily  modafinil, 400 mg, Oral, Daily  predniSONE, 12.5 mg, Oral, Daily  probenecid, 500 mg, Oral, BID  sodium chloride, 10 mL, Intravenous, Q12H  tamsulosin, 0.4 mg, Oral, BID  Vancomycin Pharmacy Intermittent/Pulse Dosing, , Does not apply, Daily      IV Meds:   Pharmacy to dose vancomycin,         Results Reviewed:   I have personally reviewed the results from the time of this admission to 9/4/2024 12:27 EDT     Lab Results   Component Value Date    GLUCOSE 113 (H) 09/04/2024    CALCIUM 8.0 (L) 09/04/2024     09/04/2024    K 3.8 09/04/2024    CO2 18.2 (L) 09/04/2024     09/04/2024    BUN 37 (H) 09/04/2024    CREATININE 2.22 (H)  09/04/2024    BCR 16.7 09/04/2024    ANIONGAP 15.8 (H) 09/04/2024      Lab Results   Component Value Date    MG 1.8 09/02/2024    ALBUMIN 3.3 (L) 09/02/2024           Assessment / Plan       Sepsis    PMR (polymyalgia rheumatica)    Current chronic use of systemic steroids    Diarrhea    JASS (acute kidney injury)    Stage 3b chronic kidney disease    Dementia without behavioral disturbance    Rheumatoid arthritis 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  JASS on QGV2b-i, with UOP not quantified, improving, likely prerenal from hypotension and suspected sepsis syndrome.  Significant peripheral edema, but lungs reasonably clear by exam and no overt CHF by imaging.  Saturating well on room air.  Normal potassium and likely NAGMA; elevated lactate has since normalized.  Fairly bland urine with no blood, 100 mg/dL protein, few white blood cells, and squamous epithelial cells.  2.  Lethargy and hypotension  3.  Alzheimer's dementia and Parkinson's disease   4.  PMR on prednisone; family believes she was compliant with medications prior to admission   5.  Chronic left foot wound for the past 6 months with ongoing bloody drainage; may have superimposed cellulitis left leg  6.  Interstitial lung disease  7.  Anemia, normocytic        PLAN:  1.  Hold off diuretics for now, but likely will resume tomorrow  2.  Agree with removal of IVF since he is starting to eat and drink  3.  Discussed with Dr. Lee; imaging left foot anticipated  4.  Bladder scan for completeness' sake  5.  Discontinue probenecid given JASS  6.  Discussed with family members at bedside      Thank you for involving us in the care of Allan Kruger.  Please feel free to call with any questions.    Que Ruvalcaba MD  09/04/24  12:27 EDT    [facility]  [phone]      Please note that portions of this note were completed with a voice recognition program.

## 2024-09-04 NOTE — PROGRESS NOTES
Name: Allan Kruger ADMIT: 2024   : 1940  PCP: Provider, No Known    MRN: [id number] LOS: 2 days   AGE/SEX: 84 y.o. male  ROOM: [facility]     Subjective   Subjective   More alert today.  Family says close to baseline.       Objective   Objective   Vital Signs  Temp:  [98.1 °F (36.7 °C)-98.8 °F (37.1 °C)] 98.8 °F (37.1 °C)  Heart Rate:  [71-80] 75  Resp:  [18-20] 20  BP: (118-143)/(68-70) 118/70  SpO2:  [93 %-100 %] 97 %  on   ;   Device (Oxygen Therapy): room air  Body mass index is 30.65 kg/m².  Physical Exam  Vitals reviewed.   Constitutional:       General: He is not in acute distress.     Appearance: He is obese.   Cardiovascular:      Rate and Rhythm: Normal rate.   Pulmonary:      Effort: No respiratory distress.      Breath sounds: Normal breath sounds.   Abdominal:      General: There is no distension.      Palpations: Abdomen is soft.   Musculoskeletal:      Right lower leg: Edema present.      Left lower leg: Edema present.   Skin:     General: Skin is warm and dry.      Comments: LLE remains pink.  Does not seem tender today.  2+ RLE edema, 3+ LLE   Neurological:      General: No focal deficit present.      Mental Status: He is alert.   Psychiatric:      Comments: Flat affect.  Does not say much       Results Review     I reviewed the patient's new clinical results.  Results from last 7 days   Lab Units 24  0545 24  0333 24  2139   WBC 10*3/mm3 17.19* 23.05* 25.78*   HEMOGLOBIN g/dL 9.2* 9.8* 10.9*   PLATELETS 10*3/mm3 188 183 190     Results from last 7 days   Lab Units 24  0545 24  0333 24  2221   SODIUM mmol/L 138 134* 130*   POTASSIUM mmol/L 3.8 4.4 5.4*   CHLORIDE mmol/L 104 98 96*   CO2 mmol/L 18.2* 18.3* 19.0*   BUN mg/dL 37* 38* 38*   CREATININE mg/dL 2.22* 3.05* 3.13*   GLUCOSE mg/dL 113* 113* 141*   EGFR mL/min/1.73 28.1* 19.2* 18.6*     Results from last 7 days   Lab Units 24  2221   ALBUMIN g/dL 3.3*   BILIRUBIN mg/dL 0.5   ALK PHOS U/L  "77   AST (SGOT) U/L 29   ALT (SGPT) U/L <5     Results from last 7 days   Lab Units 09/04/24  0545 09/03/24  0333 09/02/24  2221   CALCIUM mg/dL 8.0* 7.9* 8.3*   ALBUMIN g/dL  --   --  3.3*   MAGNESIUM mg/dL  --   --  1.8     Results from last 7 days   Lab Units 09/04/24  0545 09/03/24 2020 09/03/24  1354 09/03/24  1002 09/02/24  2325 09/02/24  2221   PROCALCITONIN ng/mL  --   --   --   --   --  48.30*   LACTATE mmol/L 1.5 2.5* 2.2* 2.8*   < >  --     < > = values in this interval not displayed.     No results found for: \"HGBA1C\", \"POCGLU\"    CT Abdomen Pelvis Without Contrast    Result Date: 9/3/2024   1.The patient's stomach and duodenum appear somewhat patulous, which may reflect gastroenteritis. 2. Fibrotic changes are noted at the lung bases bilaterally. There are patchy areas of airspace consolidation noted at the lung bases. It is impossible to know if the patient has any superimposed infiltrates, in the absence of any prior studies for comparison. 3. Increased stool burden within the rectal vault, suggesting fecal impaction. There is no evidence of proctitis. 4. Walled appearance to the urinary bladder, along with some perinephric and periureteral stranding on the left, which may reflect cystitis and ascending urinary tract infection.  Radiation dose reduction techniques were utilized, including automated exposure control and exposure modulation based on body size.   This report was finalized on 9/3/2024 1:03 AM by Dr. Jessie Oviedo M.D on Workstation: [id number]      XR Chest 1 View    Result Date: 9/2/2024  Overall diminished lung volumes, with diffuse interstitial prominence, suggesting underlying chronic interstitial lung disease and pulmonary fibrosis. I cannot exclude some infiltrate at the left lung base, in the absence of any prior studies for comparison.  This report was finalized on 9/2/2024 11:59 PM by Dr. Jessie Oviedo M.D on Workstation: [employer]      CT Head Without " Contrast    Result Date: 9/2/2024  No acute intracranial findings.  Radiation dose reduction techniques were utilized, including automated exposure control and exposure modulation based on body size.   This report was finalized on 9/2/2024 10:47 PM by Dr. Jessie Oviedo M.D on Workstation: [id number]       I have personally reviewed all medications:  Scheduled Medications  aspirin, 81 mg, Oral, Daily  atorvastatin, 10 mg, Oral, Daily  carbidopa-levodopa CR, 2 tablet, Oral, Daily  cefTRIAXone, 2,000 mg, Intravenous, Q24H  donepezil, 10 mg, Oral, Daily  fluticasone, 2 spray, Nasal, Daily  guaiFENesin, 600 mg, Oral, BID  ipratropium-albuterol, 3 mL, Nebulization, 4x Daily - RT  memantine, 10 mg, Oral, Daily  modafinil, 400 mg, Oral, Daily  predniSONE, 12.5 mg, Oral, Daily  sodium chloride, 10 mL, Intravenous, Q12H  tamsulosin, 0.4 mg, Oral, BID  Vancomycin Pharmacy Intermittent/Pulse Dosing, , Does not apply, Daily    Infusions  Pharmacy to dose vancomycin,     Diet  Diet: Cardiac; Healthy Heart (2-3 Na+); Fluid Consistency: Thin (IDDSI 0)    I have personally reviewed:  [x]  Laboratory   [x]  Microbiology   [x]  Radiology   []  EKG/Telemetry  []  Cardiology/Vascular   []  Pathology    []  Records       Assessment/Plan     Active Hospital Problems    Diagnosis  POA    **Sepsis [A41.9]  Yes    PMR (polymyalgia rheumatica) [M35.3]  Yes    Current chronic use of systemic steroids [Z79.52]  Not Applicable    Diarrhea [R19.7]  Yes    JASS (acute kidney injury) [N17.9]  Yes    Stage 3b chronic kidney disease [N18.32]  Yes    Dementia without behavioral disturbance [F03.90]  Yes    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       84 y.o. male with history of PMR on chronic prednisone, CKD 3B, seronegative RA, Parkinson's, UIP and Alzheimer's dementia  presenting with acutely altered mental status and hypotension.    Sepsis: Suspected to be due to LLE cellulitis.  Blood cultures negative so far and MRSA swab negative.  - Reviewed with family.  Apparently has had a nonhealing wound on his left heel for quite some time.  Given severity of his presentation I think we should check for deeper infection. MRI ordered  - Continue Vanco and ceftriaxone at least 24 more hours.  If blood cultures negative can DC vanc tomorrow.  - Check Dopplers    JASS/CKD 3B, improving.  Discussed with Dr. Ruvalcaba at bedside.  Appreciate his help with the fluid management/diuresis.    PMR: On home dose prednisone    Diarrhea: C. difficile toxin negative.  Appeared to have some impacted stool on CT but has had several bowel movements with improved diarrhea per staff.  Monitor for now.  Has additional bowel regimen as needed but will hold scheduled meds today      SCDs for DVT prophylaxis.  Palliative consulted.  Await their recommendations  Dispo: TBD.  Not for a few days at least      Mateo Lee MD  [facility]  09/04/24  13:25 EDT

## 2024-09-04 NOTE — CASE MANAGEMENT/SOCIAL WORK
Discharge Planning Assessment  [facility]     Patient Name: Allan Kruger  MRN: [id number]  Today's Date: 9/4/2024    Admit Date: 9/2/2024    Plan: Home with family and caregivers   Discharge Needs Assessment       Row Name 09/04/24 1442       Living Environment    People in Home spouse    Current Living Arrangements home    Potentially Unsafe Housing Conditions none    Primary Care Provided by other (see comments);spouse/significant other;self    Provides Primary Care For no one, unable/limited ability to care for self    Family Caregiver if Needed spouse;other (see comments)    Quality of Family Relationships helpful;involved;supportive    Able to Return to Prior Arrangements yes                   Discharge Plan       Row Name 09/04/24 1442       Plan    Plan Home with family and caregivers    Roadmap to Recovery Yes    Patient/Family in Agreement with Plan yes    Provided Post Acute Provider List? N/A    N/A Provider List Comment Denies need    Provided Post Acute Provider Quality & Resource List? N/A    N/A Quality & Resource List Comment Denies need    Plan Comments Spoke with patient, spouse, and caregiver at bedside. Introduced self and explained CCP role. Verified face sheet and local pharmacy is [facility]. Spouse states that patient has 24/ 7 caregivers. Family denies SNF history. Patient has walker, w/c, shower chair, grab bars, and cpap. Patient lives in 2 story home with a basement with a ramp to enter with spouse and caregivers. Patient plan is to return home with spouse and caregivers who will help assist patient as needed.                  Continued Care and Services - Admitted Since 9/2/2024    No active coordination exists for this encounter.       Expected Discharge Date and Time       Expected Discharge Date Expected Discharge Time    Sep 5, 2024            Demographic Summary       Row Name 09/04/24 1440       General Information    Admission Type inpatient    Required Notices Provided Important  Message from Medicare    Referral Source admission list    Reason for Consult discharge planning    Preferred Language English                   Functional Status       Row Name 09/04/24 1441       Functional Status    Usual Activity Tolerance moderate    Current Activity Tolerance fair       Physical Activity    On average, how many days per week do you engage in moderate to strenuous exercise (like a brisk walk)? 5 days    On average, how many minutes do you engage in exercise at this level? 20 min    Number of minutes of exercise per week 100       Functional Status, IADL    Medications assistive person    Meal Preparation assistive person    Housekeeping assistive person    Laundry assistive person    Shopping assistive person       Mental Status    General Appearance WDL WDL       Employment/    Employment Status retired                   Psychosocial    No documentation.                  Abuse/Neglect    No documentation.                  Legal    No documentation.                  Substance Abuse    No documentation.                  Patient Forms    No documentation.                     Nya Ellis RN

## 2024-09-04 NOTE — THERAPY EVALUATION
Patient Name: Allan Kruger  : 1940    MRN: [id number]                              Today's Date: 2024       Admit Date: 2024    Visit Dx:     ICD-10-CM ICD-9-CM   1. Sepsis, due to unspecified organism, unspecified whether acute organ dysfunction present  A41.9 038.9     995.91   2. Acute kidney injury  N17.9 584.9   3. Acute encephalopathy  G93.40 348.30   4. 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    Parkinson disease    Pulmonary fibrosis     Past Medical History:   Diagnosis Date    Alzheimer disease     CKD (chronic kidney disease)     Hyperlipidemia     Interstitial lung disease     Parkinson disease     PMR (polymyalgia rheumatica)     Renal cancer      Past Surgical History:   Procedure Laterality Date    NEPHRECTOMY Right       General Information       Row Name 24 1027          Physical Therapy Time and Intention    Document Type evaluation  -EF     Mode of Treatment individual therapy;physical therapy  -EF       Row Name 24 1027          General Information    Patient Profile Reviewed yes  -EF     Prior Level of Function independent:;all household mobility  rwx  -EF     Existing Precautions/Restrictions fall  -EF     Barriers to Rehab previous functional deficit;medically complex  -EF       Row Name 24 1027          Living Environment    People in Home spouse  -EF       Row Name 24 1027          Home Main Entrance    Number of Stairs, Main Entrance none  ramp  -EF       Row Name 24 1027          Cognition    Orientation Status (Cognition) oriented to;person  -EF       Row Name 24 1027          Safety Issues, Functional Mobility    Impairments Affecting Function  (Mobility) endurance/activity tolerance;postural/trunk control;strength;balance;shortness of breath  -EF               User Key  (r) = Recorded By, (t) = Taken By, (c) = Cosigned By      Initials Name Provider Type    Carin Serrano PT Physical Therapist                   Mobility       Row Name 09/04/24 1101          Bed Mobility    Bed Mobility supine-sit;sit-supine  -EF     Supine-Sit [place] (Bed Mobility) moderate assist (50% patient effort);verbal cues  -EF     Sit-Supine [place] (Bed Mobility) moderate assist (50% patient effort);2 person assist;verbal cues  -EF     Assistive Device (Bed Mobility) head of bed elevated;bed rails  -EF     Comment, (Bed Mobility) increased time required for sequencing  -EF       Row Name 09/04/24 1101          Sit-Stand Transfer    Sit-Stand [place] (Transfers) moderate assist (50% patient effort);2 person assist;verbal cues  -EF     Assistive Device (Sit-Stand Transfers) walker, front-wheeled  -EF     Comment, (Sit-Stand Transfer) cues for hand placement; STS from elevated bed surface  -EF       Row Name 09/04/24 1101          Gait/Stairs (Locomotion)    [place] Level (Gait) moderate assist (50% patient effort);2 person assist;verbal cues  -EF     Assistive Device (Gait) cane, straight  -EF     Distance in Feet (Gait) --  3 side steps to HOB  -EF     Deviations/Abnormal Patterns (Gait) demario decreased;festinating/shuffling  -EF     Bilateral Gait Deviations forward flexed posture  -EF     Left Sided Gait Deviations decreased knee extension  -EF     Right Sided Gait Deviations decreased knee extension  -EF     Comment, (Gait/Stairs) Cues for upright posture; increased SOA noted with activity  -EF               User Key  (r) = Recorded By, (t) = Taken By, (c) = Cosigned By      Initials Name Provider Type    Carin Serrano PT Physical Therapist                   Obj/Interventions       Row Name 09/04/24 1107          Range of Motion  Comprehensive    General Range of Motion bilateral lower extremity ROM WFL  -EF       Row Name 09/04/24 1107          Strength Comprehensive (MMT)    General Manual Muscle Testing (MMT) Assessment lower extremity strength deficits identified  -EF     Comment, General Manual Muscle Testing (MMT) Assessment B LEs grossly 4-/5  -EF       Row Name 09/04/24 1107          Balance    Balance Assessment sitting static balance;standing static balance;standing dynamic balance  -EF     Static Sitting Balance standby assist  -EF     Position, Sitting Balance unsupported;sitting edge of bed  -EF     Static Standing Balance minimal assist;2-person assist  -EF     Dynamic Standing Balance moderate assist;2-person assist  -EF     Position/Device Used, Standing Balance supported;walker, front-wheeled  -EF       Row Name 09/04/24 1107          Sensory Assessment (Somatosensory)    Sensory Assessment (Somatosensory) not tested  -EF               User Key  (r) = Recorded By, (t) = Taken By, (c) = Cosigned By      Initials Name Provider Type    EF Carin Ferguson, PT Physical Therapist                   Goals/Plan       Row Name 09/04/24 1111          Bed Mobility Goal 1 (PT)    Activity/Assistive Device (Bed Mobility Goal 1, PT) sit to supine/supine to sit  -EF     [place] Level/Cues Needed (Bed Mobility Goal 1, PT) minimum assist (75% or more patient effort)  -EF     Time Frame (Bed Mobility Goal 1, PT) 1 week;long term goal (LTG)  -EF     Progress/Outcomes (Bed Mobility Goal 1, PT) goal ongoing  -EF       Row Name 09/04/24 1111          Transfer Goal 1 (PT)    Activity/Assistive Device (Transfer Goal 1, PT) sit-to-stand/stand-to-sit;bed-to-chair/chair-to-bed;walker, rolling  -EF     [place] Level/Cues Needed (Transfer Goal 1, PT) minimum assist (75% or more patient effort)  -EF     Time Frame (Transfer Goal 1, PT) 1 week;long term goal (LTG)  -EF     Progress/Outcome (Transfer Goal 1, PT) goal ongoing  -EF       Row Name  09/04/24 1111          Gait Training Goal 1 (PT)    Activity/Assistive Device (Gait Training Goal 1, PT) gait (walking locomotion);walker, rolling  -EF     [place] Level (Gait Training Goal 1, PT) minimum assist (75% or more patient effort)  -EF     Distance (Gait Training Goal 1, PT) 30'  -EF     Time Frame (Gait Training Goal 1, PT) 1 week;long term goal (LTG)  -EF     Progress/Outcome (Gait Training Goal 1, PT) goal ongoing  -EF       Row Name 09/04/24 1111          Therapy Assessment/Plan (PT)    Planned Therapy Interventions (PT) balance training;bed mobility training;gait training;home exercise program;patient/family education;ROM (range of motion);strengthening;transfer training  -EF               User Key  (r) = Recorded By, (t) = Taken By, (c) = Cosigned By      Initials Name Provider Type    EF Carin Ferguson, PT Physical Therapist                   Clinical Impression       Row Name 09/04/24 1108          Pain    Pretreatment Pain Rating 0/10 - no pain  -EF     Posttreatment Pain Rating 0/10 - no pain  -EF       Row Name 09/04/24 1108          Plan of Care Review    Plan of Care Reviewed With patient;spouse  -EF     Outcome Evaluation Pt is an 85 yo male who presents from home with sepsis, multifocal PNA. PMH of Alzheimer's, CKD3, and Parkinson's disease. Prior to admission, pt was living at home with spouse and independent with household mobility using rwx. Pt had assistance for bathing and ADLs. Upon exam, pt demos generalized weakness, impaired balance, and decreased endurance limiting mobility. Pt required mod A x2 for bed mobility and mod A x2 for STS transfers. Pt was able to take a few side steps at EOB with mod A x2 and rwx. Pt fatigued quickly and demonstrated increased SOA with activity. Pt is at increased risk of falls and will continue to benefit from PT to address impairments and increase independence with mobility. Rec DC to SNF pending progress.  -EF       Row Name 09/04/24 1100           Therapy Assessment/Plan (PT)    Rehab Potential (PT) good, to achieve stated therapy goals  -EF     Criteria for Skilled Interventions Met (PT) yes;meets criteria  -EF     Therapy Frequency (PT) 5 times/wk  -EF       Row Name 09/04/24 1108          Positioning and Restraints    Pre-Treatment Position in bed  -EF     Post Treatment Position bed  -EF     In Bed fowlers;call light within reach;encouraged to call for assist;exit alarm on;with family/caregiver;legs elevated  -EF               User Key  (r) = Recorded By, (t) = Taken By, (c) = Cosigned By      Initials Name Provider Type    Carin Serrano, PT Physical Therapist                   Outcome Measures       Row Name 09/04/24 1111 09/04/24 0023       How much help from another person do you currently need...    Turning from your back to your side while in flat bed without using bedrails? 2  -EF 3  -LF    Moving from lying on back to sitting on the side of a flat bed without bedrails? 2  -EF 3  -LF    Moving to and from a bed to a chair (including a wheelchair)? 2  -EF 2  -LF    Standing up from a chair using your arms (e.g., wheelchair, bedside chair)? 2  -EF 2  -LF    Climbing 3-5 steps with a railing? 1  -EF 1  -LF    To walk in hospital room? 2  -EF 2  -LF    AM-PAC 6 Clicks Score (PT) 11  -EF 13  -LF    Highest Level of Mobility Goal 4 --> Transfer to chair/commode  -EF 4 --> Transfer to chair/commode  -LF              User Key  (r) = Recorded By, (t) = Taken By, (c) = Cosigned By      Initials Name Provider Type    Carin Serrano, PT Physical Therapist    Teagan Gabriel, RN Registered Nurse                                 Physical Therapy Education       Title: PT OT SLP Therapies (In Progress)       Topic: Physical Therapy (In Progress)       Point: Mobility training (In Progress)       Learning Progress Summary             Patient Acceptance, E, NR by EF at 9/4/2024 1112                         Point: Home exercise program (Not  Started)       Learner Progress:  Not documented in this visit.              Point: Body mechanics (In Progress)       Learning Progress Summary             Patient Acceptance, E, NR by EF at 9/4/2024 1112                         Point: Precautions (Not Started)       Learner Progress:  Not documented in this visit.                              User Key       Initials Effective Dates Name Provider Type Discipline     05/31/24 -  Carin Ferguson, PT Physical Therapist PT                  PT Recommendation and Plan  Planned Therapy Interventions (PT): balance training, bed mobility training, gait training, home exercise program, patient/family education, ROM (range of motion), strengthening, transfer training  Plan of Care Reviewed With: patient, spouse  Outcome Evaluation: Pt is an 85 yo male who presents from home with sepsis, multifocal PNA. PMH of Alzheimer's, CKD3, and Parkinson's disease. Prior to admission, pt was living at home with spouse and independent with household mobility using rwx. Pt had assistance for bathing and ADLs. Upon exam, pt demos generalized weakness, impaired balance, and decreased endurance limiting mobility. Pt required mod A x2 for bed mobility and mod A x2 for STS transfers. Pt was able to take a few side steps at EOB with mod A x2 and rwx. Pt fatigued quickly and demonstrated increased SOA with activity. Pt is at increased risk of falls and will continue to benefit from PT to address impairments and increase independence with mobility. Rec DC to SNF pending progress.     Time Calculation:         PT Charges       Row Name 09/04/24 1112             Time Calculation    Start Time 0858  -EF      Stop Time 0920  -EF      Time Calculation (min) 22 min  -EF      PT Received On 09/04/24  -EF      PT - Next Appointment 09/05/24  -EF      PT Goal Re-Cert Due Date 09/11/24  -EF         Time Calculation- PT    Total Timed Code Minutes- PT 10 minute(s)  -EF         Timed Charges    29066 - PT  Therapeutic Activity Minutes 10  -EF         Total Minutes    Timed Charges Total Minutes 10  -EF       Total Minutes 10  -EF                User Key  (r) = Recorded By, (t) = Taken By, (c) = Cosigned By      Initials Name Provider Type    EF Carin Ferguson, PT Physical Therapist                  Therapy Charges for Today       Code Description Service Date Service Provider Modifiers Qty    [id number]  PT THERAPEUTIC ACT EA 15 MIN 9/4/2024 Carin Ferguson, PT GP 1    [id number]  PT EVAL MOD COMPLEXITY 3 9/4/2024 Carin Ferguson, PT GP 1            PT G-Codes  AM-PAC 6 Clicks Score (PT): 11  PT Discharge Summary  Anticipated Discharge Disposition (PT): skilled nursing facility    Carin Ferguson PT  9/4/2024

## 2024-09-04 NOTE — PLAN OF CARE
Goal Outcome Evaluation:  Plan of Care Reviewed With: patient, spouse           Outcome Evaluation: Pt is an 83 yo male who presents from home with sepsis, multifocal PNA. PMH of Alzheimer's, CKD3, and Parkinson's disease. Prior to admission, pt was living at home with spouse and independent with household mobility using rwx. Pt had assistance for bathing and ADLs. Upon exam, pt demos generalized weakness, impaired balance, and decreased endurance limiting mobility. Pt required mod A x2 for bed mobility and mod A x2 for STS transfers. Pt was able to take a few side steps at EOB with mod A x2 and rwx. Pt fatigued quickly and demonstrated increased SOA with activity. Pt is at increased risk of falls and will continue to benefit from PT to address impairments and increase independence with mobility. Rec DC to SNF pending progress.      Anticipated Discharge Disposition (PT): skilled nursing facility

## 2024-09-04 NOTE — PROGRESS NOTES
"[facility] Clinical Pharmacy Services: Vancomycin Monitoring Note    Allan Kruger is a 84 y.o. male who is on day 2/5 of pharmacy to dose vancomycin for Skin and Soft Tissue.    Previous Vancomycin Dose:   pulse dosing - last dose 2250 9/3 @ 15:44  Updated Cultures and Sensitivities: blood cultures pending  Results from last 7 days   Lab Units 09/04/24  0545   VANCOMYCIN RM mcg/mL 13.20     Vitals/Labs  Ht: 193 cm (76\"); Wt: 114 kg (251 lb 12.3 oz)   Temp Readings from Last 1 Encounters:   09/04/24 98.1 °F (36.7 °C) (Oral)     Estimated Creatinine Clearance: 33 mL/min (A) (by C-G formula based on SCr of 2.22 mg/dL (H)).       Results from last 7 days   Lab Units 09/04/24  0545 09/03/24  0333 09/02/24  2221 09/02/24  2139   CREATININE mg/dL 2.22* 3.05* 3.13*  --    WBC 10*3/mm3 17.19* 23.05*  --  25.78*     Assessment/Plan    Will give 1000mg vanc today  Next Level Date and Time: Vanc Random on 9/5 with am labs  We will continue to monitor patient changes and renal function     Thank you for involving pharmacy in this patient's care. Please contact pharmacy with any questions or concerns.       Roger Jaeger PharmD  Clinical Pharmacist          "

## 2024-09-05 ENCOUNTER — APPOINTMENT (OUTPATIENT)
Dept: MRI IMAGING | Facility: HOSPITAL | Age: 84
End: 2024-09-05
Payer: COMMERCIAL

## 2024-09-05 ENCOUNTER — APPOINTMENT (OUTPATIENT)
Dept: CARDIOLOGY | Facility: HOSPITAL | Age: 84
End: 2024-09-05
Payer: MEDICARE

## 2024-09-05 LAB
ALBUMIN SERPL-MCNC: 3.2 G/DL (ref 3.5–5.2)
ANION GAP SERPL CALCULATED.3IONS-SCNC: 14 MMOL/L (ref 5–15)
BH CV LOW VAS LEFT LESSER SAPH VESSEL: 1
BH CV LOW VAS RIGHT LESSER SAPH VESSEL: 1
BH CV LOW VAS RIGHT VARICOSITY BK VESSEL: 1
BH CV LOWER VASCULAR LEFT COMMON FEMORAL AUGMENT: NORMAL
BH CV LOWER VASCULAR LEFT COMMON FEMORAL COMPETENT: NORMAL
BH CV LOWER VASCULAR LEFT COMMON FEMORAL COMPRESS: NORMAL
BH CV LOWER VASCULAR LEFT COMMON FEMORAL PHASIC: NORMAL
BH CV LOWER VASCULAR LEFT COMMON FEMORAL SPONT: NORMAL
BH CV LOWER VASCULAR LEFT DISTAL FEMORAL COMPRESS: NORMAL
BH CV LOWER VASCULAR LEFT GASTRONEMIUS COMPRESS: NORMAL
BH CV LOWER VASCULAR LEFT GREATER SAPH AK COMPRESS: NORMAL
BH CV LOWER VASCULAR LEFT GREATER SAPH BK COMPRESS: NORMAL
BH CV LOWER VASCULAR LEFT LESSER SAPH COMPRESS: NORMAL
BH CV LOWER VASCULAR LEFT LESSER SAPH THROMBUS: NORMAL
BH CV LOWER VASCULAR LEFT MID FEMORAL AUGMENT: NORMAL
BH CV LOWER VASCULAR LEFT MID FEMORAL COMPETENT: NORMAL
BH CV LOWER VASCULAR LEFT MID FEMORAL COMPRESS: NORMAL
BH CV LOWER VASCULAR LEFT MID FEMORAL PHASIC: NORMAL
BH CV LOWER VASCULAR LEFT MID FEMORAL SPONT: NORMAL
BH CV LOWER VASCULAR LEFT PERONEAL COMPRESS: NORMAL
BH CV LOWER VASCULAR LEFT POPLITEAL AUGMENT: NORMAL
BH CV LOWER VASCULAR LEFT POPLITEAL COMPETENT: NORMAL
BH CV LOWER VASCULAR LEFT POPLITEAL COMPRESS: NORMAL
BH CV LOWER VASCULAR LEFT POPLITEAL PHASIC: NORMAL
BH CV LOWER VASCULAR LEFT POPLITEAL SPONT: NORMAL
BH CV LOWER VASCULAR LEFT POSTERIOR TIBIAL COMPRESS: NORMAL
BH CV LOWER VASCULAR LEFT PROFUNDA FEMORAL COMPRESS: NORMAL
BH CV LOWER VASCULAR LEFT PROXIMAL FEMORAL COMPRESS: NORMAL
BH CV LOWER VASCULAR LEFT SAPHENOFEMORAL JUNCTION COMPRESS: NORMAL
BH CV LOWER VASCULAR RIGHT COMMON FEMORAL AUGMENT: NORMAL
BH CV LOWER VASCULAR RIGHT COMMON FEMORAL COMPETENT: NORMAL
BH CV LOWER VASCULAR RIGHT COMMON FEMORAL COMPRESS: NORMAL
BH CV LOWER VASCULAR RIGHT COMMON FEMORAL PHASIC: NORMAL
BH CV LOWER VASCULAR RIGHT COMMON FEMORAL SPONT: NORMAL
BH CV LOWER VASCULAR RIGHT DISTAL FEMORAL COMPRESS: NORMAL
BH CV LOWER VASCULAR RIGHT GASTRONEMIUS COMPRESS: NORMAL
BH CV LOWER VASCULAR RIGHT GREATER SAPH AK COMPRESS: NORMAL
BH CV LOWER VASCULAR RIGHT GREATER SAPH BK COMPRESS: NORMAL
BH CV LOWER VASCULAR RIGHT LESSER SAPH COMPRESS: NORMAL
BH CV LOWER VASCULAR RIGHT LESSER SAPH THROMBUS: NORMAL
BH CV LOWER VASCULAR RIGHT MID FEMORAL AUGMENT: NORMAL
BH CV LOWER VASCULAR RIGHT MID FEMORAL COMPETENT: NORMAL
BH CV LOWER VASCULAR RIGHT MID FEMORAL COMPRESS: NORMAL
BH CV LOWER VASCULAR RIGHT MID FEMORAL PHASIC: NORMAL
BH CV LOWER VASCULAR RIGHT MID FEMORAL SPONT: NORMAL
BH CV LOWER VASCULAR RIGHT PERONEAL COMPRESS: NORMAL
BH CV LOWER VASCULAR RIGHT POPLITEAL AUGMENT: NORMAL
BH CV LOWER VASCULAR RIGHT POPLITEAL COMPETENT: NORMAL
BH CV LOWER VASCULAR RIGHT POPLITEAL COMPRESS: NORMAL
BH CV LOWER VASCULAR RIGHT POPLITEAL PHASIC: NORMAL
BH CV LOWER VASCULAR RIGHT POPLITEAL SPONT: NORMAL
BH CV LOWER VASCULAR RIGHT POSTERIOR TIBIAL COMPRESS: NORMAL
BH CV LOWER VASCULAR RIGHT PROFUNDA FEMORAL COMPRESS: NORMAL
BH CV LOWER VASCULAR RIGHT PROXIMAL FEMORAL COMPRESS: NORMAL
BH CV LOWER VASCULAR RIGHT SAPHENOFEMORAL JUNCTION COMPRESS: NORMAL
BH CV LOWER VASCULAR RIGHT SOLEAL COMPRESS: NORMAL
BH CV LOWER VASCULAR RIGHT VARICOSITY BK COMPRESS: NORMAL
BH CV LOWER VASCULAR RIGHT VARICOSITY BK THROMBUS: NORMAL
BUN SERPL-MCNC: 34 MG/DL (ref 8–23)
BUN/CREAT SERPL: 16.7 (ref 7–25)
CALCIUM SPEC-SCNC: 8.3 MG/DL (ref 8.6–10.5)
CHLORIDE SERPL-SCNC: 106 MMOL/L (ref 98–107)
CO2 SERPL-SCNC: 21 MMOL/L (ref 22–29)
CREAT SERPL-MCNC: 2.04 MG/DL (ref 0.76–1.27)
DEPRECATED RDW RBC AUTO: 48.7 FL (ref 37–54)
EGFRCR SERPLBLD CKD-EPI 2021: 31.2 ML/MIN/1.73
ERYTHROCYTE [DISTWIDTH] IN BLOOD BY AUTOMATED COUNT: 15.1 % (ref 12.3–15.4)
FERRITIN SERPL-MCNC: 323 NG/ML (ref 30–400)
GLUCOSE SERPL-MCNC: 92 MG/DL (ref 65–99)
HCT VFR BLD AUTO: 27.8 % (ref 37.5–51)
HGB BLD-MCNC: 9 G/DL (ref 13–17.7)
IRON 24H UR-MRATE: 32 MCG/DL (ref 59–158)
IRON SATN MFR SERPL: 12 % (ref 20–50)
MAGNESIUM SERPL-MCNC: 2.1 MG/DL (ref 1.6–2.4)
MCH RBC QN AUTO: 28.8 PG (ref 26.6–33)
MCHC RBC AUTO-ENTMCNC: 32.4 G/DL (ref 31.5–35.7)
MCV RBC AUTO: 88.8 FL (ref 79–97)
PHOSPHATE SERPL-MCNC: 3.2 MG/DL (ref 2.5–4.5)
PLATELET # BLD AUTO: 183 10*3/MM3 (ref 140–450)
PMV BLD AUTO: 10.1 FL (ref 6–12)
POTASSIUM SERPL-SCNC: 4 MMOL/L (ref 3.5–5.2)
RBC # BLD AUTO: 3.13 10*6/MM3 (ref 4.14–5.8)
SODIUM SERPL-SCNC: 141 MMOL/L (ref 136–145)
TIBC SERPL-MCNC: 256 MCG/DL (ref 298–536)
TRANSFERRIN SERPL-MCNC: 172 MG/DL (ref 200–360)
VANCOMYCIN SERPL-MCNC: 15 MCG/ML (ref 5–40)
WBC NRBC COR # BLD AUTO: 13.94 10*3/MM3 (ref 3.4–10.8)

## 2024-09-05 PROCEDURE — 97530 THERAPEUTIC ACTIVITIES: CPT

## 2024-09-05 PROCEDURE — 25010000002 VANCOMYCIN 1 G RECONSTITUTED SOLUTION 1 EACH VIAL: Performed by: STUDENT IN AN ORGANIZED HEALTH CARE EDUCATION/TRAINING PROGRAM

## 2024-09-05 PROCEDURE — 82728 ASSAY OF FERRITIN: CPT | Performed by: INTERNAL MEDICINE

## 2024-09-05 PROCEDURE — 94761 N-INVAS EAR/PLS OXIMETRY MLT: CPT

## 2024-09-05 PROCEDURE — 80069 RENAL FUNCTION PANEL: CPT | Performed by: INTERNAL MEDICINE

## 2024-09-05 PROCEDURE — 84466 ASSAY OF TRANSFERRIN: CPT | Performed by: INTERNAL MEDICINE

## 2024-09-05 PROCEDURE — 83540 ASSAY OF IRON: CPT | Performed by: INTERNAL MEDICINE

## 2024-09-05 PROCEDURE — 80202 ASSAY OF VANCOMYCIN: CPT | Performed by: HOSPITALIST

## 2024-09-05 PROCEDURE — 25810000003 SODIUM CHLORIDE 0.9 % SOLUTION 250 ML FLEX CONT: Performed by: STUDENT IN AN ORGANIZED HEALTH CARE EDUCATION/TRAINING PROGRAM

## 2024-09-05 PROCEDURE — 25010000002 CEFTRIAXONE PER 250 MG: Performed by: HOSPITALIST

## 2024-09-05 PROCEDURE — 83735 ASSAY OF MAGNESIUM: CPT | Performed by: INTERNAL MEDICINE

## 2024-09-05 PROCEDURE — 93970 EXTREMITY STUDY: CPT | Performed by: SURGERY

## 2024-09-05 PROCEDURE — 85027 COMPLETE CBC AUTOMATED: CPT | Performed by: HOSPITALIST

## 2024-09-05 PROCEDURE — 73718 MRI LOWER EXTREMITY W/O DYE: CPT

## 2024-09-05 PROCEDURE — 63710000001 PREDNISONE PER 5 MG: Performed by: HOSPITALIST

## 2024-09-05 PROCEDURE — 93970 EXTREMITY STUDY: CPT

## 2024-09-05 PROCEDURE — 94799 UNLISTED PULMONARY SVC/PX: CPT

## 2024-09-05 PROCEDURE — 94664 DEMO&/EVAL PT USE INHALER: CPT

## 2024-09-05 RX ORDER — BENZONATATE 100 MG/1
200 CAPSULE ORAL 3 TIMES DAILY PRN
Status: DISCONTINUED | OUTPATIENT
Start: 2024-09-05 | End: 2024-09-10 | Stop reason: HOSPADM

## 2024-09-05 RX ADMIN — SODIUM CHLORIDE 1000 MG: 9 INJECTION, SOLUTION INTRAVENOUS at 17:21

## 2024-09-05 RX ADMIN — MEMANTINE HYDROCHLORIDE 10 MG: 10 TABLET, FILM COATED ORAL at 11:34

## 2024-09-05 RX ADMIN — MODAFINIL 400 MG: 100 TABLET ORAL at 13:47

## 2024-09-05 RX ADMIN — FLUTICASONE PROPIONATE 2 SPRAY: 50 SPRAY, METERED NASAL at 08:57

## 2024-09-05 RX ADMIN — ASPIRIN 81 MG: 81 TABLET, COATED ORAL at 08:55

## 2024-09-05 RX ADMIN — IPRATROPIUM BROMIDE AND ALBUTEROL SULFATE 3 ML: .5; 3 SOLUTION RESPIRATORY (INHALATION) at 10:33

## 2024-09-05 RX ADMIN — TAMSULOSIN HYDROCHLORIDE 0.4 MG: 0.4 CAPSULE ORAL at 08:55

## 2024-09-05 RX ADMIN — CEFTRIAXONE 2000 MG: 2 INJECTION, POWDER, FOR SOLUTION INTRAMUSCULAR; INTRAVENOUS at 22:14

## 2024-09-05 RX ADMIN — IPRATROPIUM BROMIDE AND ALBUTEROL SULFATE 3 ML: .5; 3 SOLUTION RESPIRATORY (INHALATION) at 07:33

## 2024-09-05 RX ADMIN — DONEPEZIL HYDROCHLORIDE 10 MG: 10 TABLET, FILM COATED ORAL at 08:56

## 2024-09-05 RX ADMIN — TAMSULOSIN HYDROCHLORIDE 0.4 MG: 0.4 CAPSULE ORAL at 22:13

## 2024-09-05 RX ADMIN — IPRATROPIUM BROMIDE AND ALBUTEROL SULFATE 3 ML: .5; 3 SOLUTION RESPIRATORY (INHALATION) at 14:04

## 2024-09-05 RX ADMIN — Medication 10 ML: at 22:14

## 2024-09-05 RX ADMIN — BENZONATATE 200 MG: 100 CAPSULE ORAL at 22:13

## 2024-09-05 RX ADMIN — PREDNISONE 12.5 MG: 10 TABLET ORAL at 08:55

## 2024-09-05 RX ADMIN — CARBIDOPA AND LEVODOPA 2 TABLET: 50; 200 TABLET, EXTENDED RELEASE ORAL at 08:55

## 2024-09-05 RX ADMIN — GUAIFENESIN 600 MG: 600 TABLET, EXTENDED RELEASE ORAL at 08:55

## 2024-09-05 RX ADMIN — ATORVASTATIN CALCIUM 10 MG: 20 TABLET, FILM COATED ORAL at 08:55

## 2024-09-05 RX ADMIN — BENZONATATE 200 MG: 100 CAPSULE ORAL at 11:33

## 2024-09-05 RX ADMIN — GUAIFENESIN 600 MG: 600 TABLET, EXTENDED RELEASE ORAL at 22:13

## 2024-09-05 NOTE — PROGRESS NOTES
[facility] Progress Note      Patient Name: Allan Kruger  : 1940  MRN: [id number]  Primary Care Physician:  Provider, No Known  Date of admission: 2024    Subjective     Interval History:   Follow-up on JASS and CKD stage IIIa-IIIb    Sleeping with CPAP  No CP or N/V/D; tolerating PO intake well without problems  UOP past 24 hours not fully recorded    Review of Systems:   As noted above    Objective     Vitals:   Temp:  [97.5 °F (36.4 °C)-98.8 °F (37.1 °C)] 98.5 °F (36.9 °C)  Heart Rate:  [59-77] 68  Resp:  [0-20] 18  BP: (118-137)/(64-82) 137/82    Intake/Output Summary (Last 24 hours) at 2024 1122  Last data filed at 2024 1740  Gross per 24 hour   Intake --   Output 500 ml   Net -500 ml       Physical Exam:    General: More awake than yesterday, conversant, chr ill; obese; NAD Skin: warm and dry, erythema to left ankle  HEENT: oral mucosa normal, nonicteric sclera  Neck: supple, no JVD  Lungs: Bibasilar crackles with exp wheezing, nonlabored on CPAP  Heart: RRR, + 2/6 murmur, no rub  Abdomen: soft, nontender, distended, BS +  : no palpable bladder  Extremities: +2-3 edema on left, 2+ edema on right, left foot appears to have a Charcot deformity   Neuro: Moves all extremities, follows commands    Scheduled Meds:     aspirin, 81 mg, Oral, Daily  atorvastatin, 10 mg, Oral, Daily  carbidopa-levodopa CR, 2 tablet, Oral, Daily  cefTRIAXone, 2,000 mg, Intravenous, Q24H  donepezil, 10 mg, Oral, Daily  fluticasone, 2 spray, Nasal, Daily  guaiFENesin, 600 mg, Oral, BID  ipratropium-albuterol, 3 mL, Nebulization, 4x Daily - RT  memantine, 10 mg, Oral, Daily  modafinil, 400 mg, Oral, Daily  predniSONE, 12.5 mg, Oral, Daily  sodium chloride, 10 mL, Intravenous, Q12H  tamsulosin, 0.4 mg, Oral, BID  Vancomycin Pharmacy Intermittent/Pulse Dosing, , Does not apply, Daily      IV Meds:   Pharmacy to dose vancomycin,         Results Reviewed:   I have personally reviewed the  results from the time of this admission to 9/5/2024 11:22 EDT     Results from last 7 days   Lab Units 09/05/24  0427 09/04/24  0545 09/03/24  0333 09/02/24  2221   SODIUM mmol/L 141 138 134* 130*   POTASSIUM mmol/L 4.0 3.8 4.4 5.4*   CHLORIDE mmol/L 106 104 98 96*   CO2 mmol/L 21.0* 18.2* 18.3* 19.0*   BUN mg/dL 34* 37* 38* 38*   CREATININE mg/dL 2.04* 2.22* 3.05* 3.13*   CALCIUM mg/dL 8.3* 8.0* 7.9* 8.3*   BILIRUBIN mg/dL  --   --   --  0.5   ALK PHOS U/L  --   --   --  77   ALT (SGPT) U/L  --   --   --  <5   AST (SGOT) U/L  --   --   --  29   GLUCOSE mg/dL 92 113* 113* 141*       Estimated Creatinine Clearance: 35.9 mL/min (A) (by C-G formula based on SCr of 2.04 mg/dL (H)).    Results from last 7 days   Lab Units 09/05/24  0427 09/02/24  2221   MAGNESIUM mg/dL 2.1 1.8   PHOSPHORUS mg/dL 3.2  --              Results from last 7 days   Lab Units 09/05/24 0427 09/04/24  0545 09/03/24  0333 09/02/24  2139   WBC 10*3/mm3 13.94* 17.19* 23.05* 25.78*   HEMOGLOBIN g/dL 9.0* 9.2* 9.8* 10.9*   PLATELETS 10*3/mm3 183 188 183 190       Results from last 7 days   Lab Units 09/02/24  2139   INR  1.39*       Assessment / Plan     ASSESSMENT:  JASS on LVP7v-d, with UOP not quantified, improving, likely prerenal from hypotension and suspected sepsis syndrome.  Urine with no blood, 100 mg/dL protein, few white blood cells, and squamous epithelial cells. Likely NAGMA with gap closing. Continues to have significant peripheral edema, now with bibasilar crackles.  Normal potassium and sodium.  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  Please feel free to call with any questions.    Que Ruvalcaba MD  09/05/24  11:22 EDT    [facility]  [phone]    Please note that portions of this note were completed with a voice recognition program.

## 2024-09-05 NOTE — PROGRESS NOTES
Name: Allan Kruger ADMIT: 2024   : 1940  PCP: Provider, No Known    MRN: [id number] LOS: 3 days   AGE/SEX: 84 y.o. male  ROOM: [facility]     Subjective   Subjective     No new complaints.        Objective   Objective   Vital Signs  Temp:  [97.5 °F (36.4 °C)-98.5 °F (36.9 °C)] 98.5 °F (36.9 °C)  Heart Rate:  [59-77] 71  Resp:  [0-18] 18  BP: (126-137)/(64-82) 136/75  SpO2:  [94 %-99 %] 96 %  on   ;   Device (Oxygen Therapy): room air  Body mass index is 30.65 kg/m².  Physical Exam  Vitals reviewed.   Constitutional:       General: He is not in acute distress.     Appearance: He is obese.   Cardiovascular:      Rate and Rhythm: Normal rate.   Pulmonary:      Effort: No respiratory distress.      Breath sounds: Normal breath sounds.   Abdominal:      General: There is no distension.      Palpations: Abdomen is soft.   Musculoskeletal:      Right lower leg: Edema present.      Left lower leg: Edema present.   Skin:     General: Skin is warm and dry.      Comments: LLE remains pink and warm   Neurological:      General: No focal deficit present.      Mental Status: He is alert.   Psychiatric:      Comments: Flat affect.  Does not say much       Results Review     I reviewed the patient's new clinical results.  Results from last 7 days   Lab Units 24  0545 24  0333 09/02/24  2139   WBC 10*3/mm3 13.94* 17.19* 23.05* 25.78*   HEMOGLOBIN g/dL 9.0* 9.2* 9.8* 10.9*   PLATELETS 10*3/mm3 183 188 183 190     Results from last 7 days   Lab Units 24  0545 24  0333 24  2221   SODIUM mmol/L 141 138 134* 130*   POTASSIUM mmol/L 4.0 3.8 4.4 5.4*   CHLORIDE mmol/L 106 104 98 96*   CO2 mmol/L 21.0* 18.2* 18.3* 19.0*   BUN mg/dL 34* 37* 38* 38*   CREATININE mg/dL 2.04* 2.22* 3.05* 3.13*   GLUCOSE mg/dL 92 113* 113* 141*   EGFR mL/min/1.73 31.2* 28.1* 19.2* 18.6*     Results from last 7 days   Lab Units 24  0427 24  2221   ALBUMIN g/dL 3.2* 3.3*  "  BILIRUBIN mg/dL  --  0.5   ALK PHOS U/L  --  77   AST (SGOT) U/L  --  29   ALT (SGPT) U/L  --  <5     Results from last 7 days   Lab Units 09/05/24  0427 09/04/24  0545 09/03/24  0333 09/02/24  2221   CALCIUM mg/dL 8.3* 8.0* 7.9* 8.3*   ALBUMIN g/dL 3.2*  --   --  3.3*   MAGNESIUM mg/dL 2.1  --   --  1.8   PHOSPHORUS mg/dL 3.2  --   --   --      Results from last 7 days   Lab Units 09/04/24  0545 09/03/24 2020 09/03/24  1354 09/03/24  1002 09/02/24  2325 09/02/24  2221   PROCALCITONIN ng/mL  --   --   --   --   --  48.30*   LACTATE mmol/L 1.5 2.5* 2.2* 2.8*   < >  --     < > = values in this interval not displayed.     No results found for: \"HGBA1C\", \"POCGLU\"    No radiology results for the last day    I have personally reviewed all medications:  Scheduled Medications  aspirin, 81 mg, Oral, Daily  atorvastatin, 10 mg, Oral, Daily  carbidopa-levodopa CR, 2 tablet, Oral, Daily  cefTRIAXone, 2,000 mg, Intravenous, Q24H  donepezil, 10 mg, Oral, Daily  fluticasone, 2 spray, Nasal, Daily  guaiFENesin, 600 mg, Oral, BID  ipratropium-albuterol, 3 mL, Nebulization, 4x Daily - RT  memantine, 10 mg, Oral, Daily  modafinil, 400 mg, Oral, Daily  predniSONE, 12.5 mg, Oral, Daily  sodium chloride, 10 mL, Intravenous, Q12H  tamsulosin, 0.4 mg, Oral, BID  Vancomycin Pharmacy Intermittent/Pulse Dosing, , Does not apply, Daily    Infusions  Pharmacy to dose vancomycin,     Diet  Diet: Cardiac; Healthy Heart (2-3 Na+); Fluid Consistency: Thin (IDDSI 0)    I have personally reviewed:  [x]  Laboratory   [x]  Microbiology   [x]  Radiology   []  EKG/Telemetry  []  Cardiology/Vascular   []  Pathology    []  Records       Assessment/Plan     Active Hospital Problems    Diagnosis  POA    **Sepsis [A41.9]  Yes    PMR (polymyalgia rheumatica) [M35.3]  Yes    Current chronic use of systemic steroids [Z79.52]  Not Applicable    Diarrhea [R19.7]  Yes    JASS (acute kidney injury) [N17.9]  Yes    Stage 3b chronic kidney disease [N18.32]  Yes    " Dementia without behavioral disturbance [F03.90]  Yes    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       84 y.o. male with history of PMR on chronic prednisone, CKD 3B, seronegative RA, Parkinson's, UIP and Alzheimer's dementia presenting with acutely altered mental status and hypotension.    Sepsis: Suspected to be due to LLE cellulitis.  Blood cultures negative so far and MRSA swab negative.  - Reviewed with family.  Apparently has had a nonhealing wound on his left heel for quite some time.  Given severity of his presentation I think we should check for deeper infection. MRI ordered  - Continue Vanco and ceftriaxone at least 24 more hours.  BCX negative at 2 days  - Check Dopplers- some superficial thrombophlebitis noted however no DVTs. Already on aspirin    JASS/CKD 3B, improving.  Discussed with Dr. Ruvalcaba at bedside.  Appreciate his help with the fluid management/diuresis.    PMR: On home dose prednisone    Diarrhea: C. difficile toxin negative.  Appeared to have some impacted stool on CT but has had several bowel movements with improved diarrhea per staff.  Monitor for now.        SCDs for DVT prophylaxis.  Palliative consulted.  Await their recommendations  Dispo: TBD.  Not for a few days at least      Jimmie Molina MD  [facility]  09/05/24  14:03 EDT

## 2024-09-05 NOTE — PROGRESS NOTES
"[facility] Clinical Pharmacy Services: Vancomycin Monitoring Note    Allan Kruger is a 84 y.o. male who is on day 2/5 of pharmacy to dose vancomycin for Skin and Soft Tissue.    Previous Vancomycin Dose:   pulse dosing - last dose 2250 9/3 @ 15:44  Updated Cultures and Sensitivities: blood cultures pending  Results from last 7 days   Lab Units 09/05/24  0427 09/04/24  0545   VANCOMYCIN RM mcg/mL 15.00 13.20     Vitals/Labs  Ht: 193 cm (76\"); Wt: 114 kg (251 lb 12.3 oz)   Temp Readings from Last 1 Encounters:   09/05/24 98.5 °F (36.9 °C) (Oral)     Estimated Creatinine Clearance: 35.9 mL/min (A) (by C-G formula based on SCr of 2.04 mg/dL (H)).       Results from last 7 days   Lab Units 09/05/24  0427 09/04/24  0545 09/03/24  0333   CREATININE mg/dL 2.04* 2.22* 3.05*   WBC 10*3/mm3 13.94* 17.19* 23.05*     Assessment/Plan    Will give 1000mg vanc today  Next Level Date and Time: Vanc Random on 9/6 with am labs  We will continue to monitor patient changes and renal function     Thank you for involving pharmacy in this patient's care. Please contact pharmacy with any questions or concerns.     Alondra Ferreira, PharmD, BCPS  9/5/2024 15:07 EDT               "

## 2024-09-05 NOTE — THERAPY TREATMENT NOTE
Patient Name: Allan Kruger  : 1940    MRN: [id number]                              Today's Date: 2024       Admit Date: 2024    Visit Dx:     ICD-10-CM ICD-9-CM   1. Sepsis, due to unspecified organism, unspecified whether acute organ dysfunction present  A41.9 038.9     995.91   2. Acute kidney injury  N17.9 584.9   3. Acute encephalopathy  G93.40 348.30   4. 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    Parkinson disease    Pulmonary fibrosis     Past Medical History:   Diagnosis Date    Alzheimer disease     CKD (chronic kidney disease)     Hyperlipidemia     Interstitial lung disease     Parkinson disease     PMR (polymyalgia rheumatica)     Renal cancer      Past Surgical History:   Procedure Laterality Date    NEPHRECTOMY Right       General Information       Row Name 24 1525          Physical Therapy Time and Intention    Document Type therapy note (daily note)  -EF (r) CK (t) EF (c)     Mode of Treatment individual therapy;physical therapy  -EF (r) CK (t) EF (c)       Row Name 24 1525          General Information    Existing Precautions/Restrictions fall  -EF (r) CK (t) EF (c)       Row Name 24 1525          Cognition    Orientation Status (Cognition) oriented to;person  -EF (r) CK (t) EF (c)       Row Name 24 1525          Safety Issues, Functional Mobility    Impairments Affecting Function (Mobility) endurance/activity tolerance;postural/trunk control;strength;balance;shortness of breath  -EF (r) CK (t) EF (c)               User Key  (r) = Recorded By, (t) = Taken By, (c) = Cosigned By      Initials Name Provider Type    EF Carin Ferguson PT Physical Therapist    CK  Nan Burnett, PT Student PT Student                   Mobility       Row Name 09/05/24 1525          Bed Mobility    Bed Mobility supine-sit;sit-supine  -EF (r) CK (t) EF (c)     Supine-Sit [place] (Bed Mobility) 2 person assist;verbal cues;moderate assist (50% patient effort)  -EF (r) CK (t) EF (c)     Sit-Supine [place] (Bed Mobility) 2 person assist;verbal cues;moderate assist (50% patient effort)  -EF (r) CK (t) EF (c)     Assistive Device (Bed Mobility) bed rails;draw sheet;head of bed elevated  -EF (r) CK (t) EF (c)     Comment, (Bed Mobility) positioned diagonally in bed when entered so required increased assistance for proper positioning; increased time required for sequencing  -EF (r) CK (t) EF (c)       Row Name 09/05/24 1525          Sit-Stand Transfer    Sit-Stand [place] (Transfers) 2 person assist;verbal cues;moderate assist (50% patient effort)  -EF (r) CK (t) EF (c)     Assistive Device (Sit-Stand Transfers) walker, front-wheeled  -EF (r) CK (t) EF (c)       Row Name 09/05/24 1525          Gait/Stairs (Locomotion)    [place] Level (Gait) moderate assist (50% patient effort);2 person assist;verbal cues  -EF (r) CK (t) EF (c)     Assistive Device (Gait) walker, front-wheeled  -EF (r) CK (t) EF (c)     Deviations/Abnormal Patterns (Gait) demario decreased;stride length decreased  -EF (r) CK (t) EF (c)     Bilateral Gait Deviations forward flexed posture  -EF (r) CK (t) EF (c)     Left Sided Gait Deviations decreased knee extension  -EF (r) CK (t) EF (c)     Right Sided Gait Deviations decreased knee extension  -EF (r) CK (t) EF (c)     Comment, (Gait/Stairs) about 10 small side steps to EOB; required cuing for sequencing  -EF (r) CK (t) EF (c)               User Key  (r) = Recorded By, (t) = Taken By, (c) = Cosigned By      Initials Name Provider Type    EF Carin Ferguson, PT Physical Therapist    Nan Slade, PT Student PT Student                   Obj/Interventions        Row Name 09/05/24 1529          Balance    Static Sitting Balance contact guard  -EF (r) CK (t) EF (c)     Static Standing Balance minimal assist;2-person assist  -EF (r) CK (t) EF (c)     Dynamic Standing Balance minimal assist;2-person assist  -EF (r) CK (t) EF (c)     Position/Device Used, Standing Balance walker, front-wheeled  -EF (r) CK (t) EF (c)               User Key  (r) = Recorded By, (t) = Taken By, (c) = Cosigned By      Initials Name Provider Type    EF Carin Ferguson, PT Physical Therapist    Nan Slade, PT Student PT Student                   Goals/Plan    No documentation.                  Clinical Impression       Row Name 09/05/24 1530          Pain    Pretreatment Pain Rating 0/10 - no pain  -EF (r) CK (t) EF (c)     Posttreatment Pain Rating 0/10 - no pain  -EF (r) CK (t) EF (c)       Row Name 09/05/24 1530          Plan of Care Review    Outcome Evaluation Pt prepositioned diagonally when PT entered room and asleep. Pt more alert this date compared to yesterday therapy session but still disoriented to location and situation. Pt required mod A x 2 for bed mobility, STS, and take side steps to EOB. C/o lethargy and lightheadedness with O2 sat 90% once seated. Caregiver and wife entered the room at the end of treatment session. Rec D/C to home with assistance from wife and caretaker or SNF.  -EF (r) CK (t) EF (c)       Row Name 09/05/24 1530          Therapy Assessment/Plan (PT)    Rehab Potential (PT) good, to achieve stated therapy goals  -EF (r) CK (t) EF (c)     Criteria for Skilled Interventions Met (PT) yes;meets criteria  -EF (r) CK (t) EF (c)     Therapy Frequency (PT) 5 times/wk  -EF (r) CK (t) EF (c)       Row Name 09/05/24 1530          Positioning and Restraints    Pre-Treatment Position in bed  -EF (r) CK (t) EF (c)     Post Treatment Position bed  -EF (r) CK (t) EF (c)     In Bed fowlers;with family/caregiver;exit alarm on;encouraged to call for assist;call light  within reach  -EF (r) CK (t) EF (c)               User Key  (r) = Recorded By, (t) = Taken By, (c) = Cosigned By      Initials Name Provider Type    Carin Serrano, PT Physical Therapist    Nan Slade, PT Student PT Student                   Outcome Measures       Row Name 09/05/24 1536          How much help from another person do you currently need...    Turning from your back to your side while in flat bed without using bedrails? 2  -EF (r) CK (t) EF (c)     Moving from lying on back to sitting on the side of a flat bed without bedrails? 2  -EF (r) CK (t) EF (c)     Moving to and from a bed to a chair (including a wheelchair)? 2  -EF (r) CK (t) EF (c)     Standing up from a chair using your arms (e.g., wheelchair, bedside chair)? 2  -EF (r) CK (t) EF (c)     Climbing 3-5 steps with a railing? 1  -EF (r) CK (t) EF (c)     To walk in hospital room? 2  -EF (r) CK (t) EF (c)     AM-PAC 6 Clicks Score (PT) 11  -EF (r) CK (t)     Highest Level of Mobility Goal 4 --> Transfer to chair/commode  -EF (r) CK (t)       Row Name 09/05/24 1536          Functional Assessment    Outcome Measure Options AM-PAC 6 Clicks Basic Mobility (PT)  -EF (r) CK (t) EF (c)               User Key  (r) = Recorded By, (t) = Taken By, (c) = Cosigned By      Initials Name Provider Type    Carin Serrano, PT Physical Therapist    Nan Slade, PT Student PT Student                                 Physical Therapy Education       Title: PT OT SLP Therapies (In Progress)       Topic: Physical Therapy (In Progress)       Point: Mobility training (In Progress)       Learning Progress Summary             Patient Acceptance, E, NR by CK at 9/5/2024 1536    Acceptance, E, NR by JULIUS at 9/4/2024 1112                         Point: Home exercise program (Not Started)       Learner Progress:  Not documented in this visit.              Point: Body mechanics (In Progress)       Learning Progress Summary             Patient  Acceptance, E, NR by EF at 9/4/2024 1112                         Point: Precautions (Not Started)       Learner Progress:  Not documented in this visit.                              User Key       Initials Effective Dates Name Provider Type Discipline    EF 05/31/24 -  Carin Ferguson, PT Physical Therapist PT    HAI 08/22/24 -  Nan Burnett, PT Student PT Student PT                  PT Recommendation and Plan     Outcome Evaluation: Pt prepositioned diagonally when PT entered room and asleep. Pt more alert this date compared to yesterday therapy session but still disoriented to location and situation. Pt required mod A x 2 for bed mobility, STS, and take side steps to EOB. C/o lethargy and lightheadedness with O2 sat 90% once seated. Caregiver and wife entered the room at the end of treatment session. Rec D/C to home with assistance from wife and caretaker or SNF.     Time Calculation:         PT Charges       Row Name 09/05/24 1536             Time Calculation    Start Time 1500  -EF (r) CK (t) EF (c)      Stop Time 1517  -EF (r) CK (t) EF (c)      Time Calculation (min) 17 min  -EF (r) CK (t)      PT Received On 09/05/24  -EF (r) CK (t) EF (c)      PT - Next Appointment 09/06/24  -EF (r) CK (t) EF (c)         Time Calculation- PT    Total Timed Code Minutes- PT 17 minute(s)  -EF (r) CK (t) EF (c)         Timed Charges    21950 - PT Therapeutic Activity Minutes 17  -EF (r) CK (t) EF (c)         Total Minutes    Timed Charges Total Minutes 17  -EF (r) CK (t)       Total Minutes 17  -EF (r) CK (t)                User Key  (r) = Recorded By, (t) = Taken By, (c) = Cosigned By      Initials Name Provider Type    EF Carin Ferguson, PT Physical Therapist    CK Nan Burnett, PT Student PT Student                      PT G-Codes  Outcome Measure Options: AM-PAC 6 Clicks Basic Mobility (PT)  AM-PAC 6 Clicks Score (PT): 11  PT Discharge Summary  Anticipated Discharge Disposition (PT): home with assist, skilled  nursing facility    Nan Burnett, PT Student  9/5/2024

## 2024-09-05 NOTE — PROGRESS NOTES
"Nutrition Services    Patient Name:  Allan Kruger  YOB: 1940  MRN: [id number]  Admit Date:  9/2/2024  Assessment Date:  09/05/24    Summary: Nutrition screen for skin integrity  This is a 83 yo male with Hx of Alzheimer's dementia, RCC status post nephrectomy, CKD , PMR on chronic prednisone, Parkinson's disease, and interstitial lung disease. He has an Left heel unstageable wound.  Po intake was down for a couple of day. Wife was interested in him getting a supplement.   Po intake better today. BMI 30, wt stable.    Plan/Recommendation  Good po intake encouraged  Pankaj offered for wound healing  RD to follow      CLINICAL NUTRITION ASSESSMENT      Reason for Assessment Pressure Injury and/or Non-Healing Wound     Diagnosis/Problem   Sepsis, Alzheimer's dementia, RCC status post nephrectomy, CKD , PMR on chronic prednisone, Parkinson's disease, and interstitial lung disease.    Medical/Surgical History Past Medical History:   Diagnosis Date    Alzheimer disease     CKD (chronic kidney disease)     Hyperlipidemia     Interstitial lung disease     Parkinson disease     PMR (polymyalgia rheumatica)     Renal cancer        Past Surgical History:   Procedure Laterality Date    NEPHRECTOMY Right         Anthropometrics        Current Height  Current Weight  BMI kg/m2 Height: 193 cm (76\") (from EMS record in media tab)  Weight: 114 kg (251 lb 12.3 oz) (09/03/24 0108)  Body mass index is 30.65 kg/m².   Adjusted BMI (if applicable)    BMI Category Obese, Class I (30 - 34.9)   Ideal Body Weight (IBW) 191lb   Usual Body Weight (UBW) 250's   Weight Trend Stable   Weight History Wt Readings from Last 30 Encounters:   09/03/24 0108 114 kg (251 lb 12.3 oz)   09/02/24 2057 116 kg (255 lb)      --  Labs       Pertinent Labs    Results from last 7 days   Lab Units 09/05/24  0427 09/04/24  0545 09/03/24  0333 09/02/24  2221   SODIUM mmol/L 141 138 134* 130*   POTASSIUM mmol/L 4.0 3.8 4.4 5.4*   CHLORIDE mmol/L 106 " "104 98 96*   CO2 mmol/L 21.0* 18.2* 18.3* 19.0*   BUN mg/dL 34* 37* 38* 38*   CREATININE mg/dL 2.04* 2.22* 3.05* 3.13*   CALCIUM mg/dL 8.3* 8.0* 7.9* 8.3*   BILIRUBIN mg/dL  --   --   --  0.5   ALK PHOS U/L  --   --   --  77   ALT (SGPT) U/L  --   --   --  <5   AST (SGOT) U/L  --   --   --  29   GLUCOSE mg/dL 92 113* 113* 141*     Results from last 7 days   Lab Units 09/05/24  0427 09/03/24  0333 09/02/24  2221   MAGNESIUM mg/dL 2.1  --  1.8   PHOSPHORUS mg/dL 3.2  --   --    HEMOGLOBIN g/dL 9.0*   < >  --    HEMATOCRIT % 27.8*   < >  --    WBC 10*3/mm3 13.94*   < >  --    ALBUMIN g/dL 3.2*  --  3.3*    < > = values in this interval not displayed.     Results from last 7 days   Lab Units 09/05/24  0427 09/04/24  0545 09/03/24  0333 09/02/24  2139   INR   --   --   --  1.39*   PLATELETS 10*3/mm3 183 188 183 190     No results found for: \"COVID19\"  No results found for: \"HGBA1C\"       Medications           Scheduled Medications aspirin, 81 mg, Oral, Daily  atorvastatin, 10 mg, Oral, Daily  carbidopa-levodopa CR, 2 tablet, Oral, Daily  cefTRIAXone, 2,000 mg, Intravenous, Q24H  donepezil, 10 mg, Oral, Daily  fluticasone, 2 spray, Nasal, Daily  guaiFENesin, 600 mg, Oral, BID  ipratropium-albuterol, 3 mL, Nebulization, 4x Daily - RT  memantine, 10 mg, Oral, Daily  modafinil, 400 mg, Oral, Daily  predniSONE, 12.5 mg, Oral, Daily  sodium chloride, 10 mL, Intravenous, Q12H  tamsulosin, 0.4 mg, Oral, BID  Vancomycin Pharmacy Intermittent/Pulse Dosing, , Does not apply, Daily       Infusions Pharmacy to dose vancomycin,        PRN Medications   acetaminophen **OR** acetaminophen **OR** acetaminophen    benzonatate    senna-docusate sodium **AND** polyethylene glycol **AND** bisacodyl **AND** bisacodyl    cadexomer iodine    calcium carbonate    nitroglycerin    ondansetron ODT **OR** ondansetron    Pharmacy to dose vancomycin    sodium chloride    sodium chloride     Physical Findings          General Findings disoriented, " responds/arouses to voice   Oral/Mouth Cavity WDL   Edema  1+ (trace), 2+ (mild), 3+ (moderate)   Gastrointestinal fecal incontinence, last bowel movement: 9/3   Skin  pressure injury: left posterior foot pressure injury   Tubes/Drains/Lines none   NFPE Not indicated at this time   --  Current Nutrition Orders & Evaluation of Intake       Oral Nutrition     Food Allergies NKFA   Current PO Diet Diet: Cardiac; Healthy Heart (2-3 Na+); Fluid Consistency: Thin (IDDSI 0)   Supplement n/a   PO Evaluation     % PO Intake %    Factors Affecting Intake: No factors at this time   --  PES STATEMENT / NUTRITION DIAGNOSIS      Nutrition Dx Problem  Problem: Increased Nutrient Needs  Etiology: Medical Diagnosis - JASS and CKD, sepsis, wound    Signs/Symptoms: Report/Observation     NUTRITION INTERVENTION / PLAN OF CARE      Intervention Goal(s) Maintain nutrition status, Reduce/improve symptoms, Appropriate weight loss, and PO intake goal %: 75+         RD Intervention/Action Interview for preferences, Encourage intake, Continue to monitor, Care plan reviewed, and Recommend/order: supplements   --      Prescription/Orders:       PO Diet       Supplements Pankaj      Enteral Nutrition       Parenteral Nutrition    New Prescription Ordered? Yes   --      Monitor/Evaluation Per protocol, Skin status   Discharge Plan/Needs Pending clinical course   --    RD to follow per protocol.      Electronically signed by:  Mary Greer RD  09/05/24 12:39 EDT

## 2024-09-05 NOTE — PLAN OF CARE
Problem: Adult Inpatient Plan of Care  Goal: Plan of Care Review  Outcome: Ongoing, Progressing  Flowsheets (Taken 9/5/2024 0504)  Progress: improving  Plan of Care Reviewed With: patient  Outcome Evaluation: Patient A&O to person.  Family at the bedside during the shift.  MRI L foot ordered and Left leg doppler ordered.  Used home CPAP machine throughout the night while sleeping.  VSS.  No requests at this time.  Call light within reach.  Goal: Patient-Specific Goal (Individualized)  Outcome: Ongoing, Progressing  Goal: Absence of Hospital-Acquired Illness or Injury  Outcome: Ongoing, Progressing  Intervention: Identify and Manage Fall Risk  Recent Flowsheet Documentation  Taken 9/5/2024 0441 by Teagan Cruz RN  Safety Promotion/Fall Prevention:   safety round/check completed   room organization consistent   nonskid shoes/slippers when out of bed   fall prevention program maintained  Taken 9/5/2024 0200 by Teagan Cruz RN  Safety Promotion/Fall Prevention:   safety round/check completed   room organization consistent   nonskid shoes/slippers when out of bed  Taken 9/5/2024 0059 by Teagan Cruz RN  Safety Promotion/Fall Prevention:   safety round/check completed   room organization consistent   nonskid shoes/slippers when out of bed   fall prevention program maintained  Taken 9/4/2024 2258 by Teagan Cruz RN  Safety Promotion/Fall Prevention:   safety round/check completed   room organization consistent   nonskid shoes/slippers when out of bed   fall prevention program maintained  Taken 9/4/2024 2058 by Teagan Cruz RN  Safety Promotion/Fall Prevention:   safety round/check completed   room organization consistent   nonskid shoes/slippers when out of bed   fall prevention program maintained  Intervention: Prevent Skin Injury  Recent Flowsheet Documentation  Taken 9/5/2024 0441 by Teagan Cruz, RN  Body Position:   turned   left  Taken 9/5/2024 0200 by Teagan Cruz  MURRAY García  Body Position: supine, legs elevated  Taken 9/5/2024 0059 by Teagan Cruz RN  Body Position:   left   tilted  Skin Protection: adhesive use limited  Taken 9/4/2024 2258 by Teagan Cruz RN  Body Position:   right   tilted  Taken 9/4/2024 2058 by Teagan Cruz RN  Body Position: sitting up in bed  Skin Protection: adhesive use limited  Intervention: Prevent and Manage VTE (Venous Thromboembolism) Risk  Recent Flowsheet Documentation  Taken 9/5/2024 0441 by Teagan Cruz RN  Activity Management: activity encouraged  Taken 9/5/2024 0059 by Teagan Cruz RN  Activity Management: activity encouraged  Range of Motion: active ROM (range of motion) encouraged  Taken 9/4/2024 2058 by Teagan Cruz RN  Activity Management: activity encouraged  Range of Motion: active ROM (range of motion) encouraged  Intervention: Prevent Infection  Recent Flowsheet Documentation  Taken 9/5/2024 0059 by Teagan Cruz RN  Infection Prevention: single patient room provided  Taken 9/4/2024 2058 by Teagan Cruz RN  Infection Prevention: single patient room provided  Goal: Optimal Comfort and Wellbeing  Outcome: Ongoing, Progressing  Intervention: Provide Person-Centered Care  Recent Flowsheet Documentation  Taken 9/5/2024 0059 by Teagan Cruz RN  Trust Relationship/Rapport: care explained  Taken 9/4/2024 2058 by Teagan Cruz RN  Trust Relationship/Rapport:   care explained   choices provided   questions answered   questions encouraged  Goal: Readiness for Transition of Care  Outcome: Ongoing, Progressing     Problem: Fall Injury Risk  Goal: Absence of Fall and Fall-Related Injury  Outcome: Ongoing, Progressing  Intervention: Identify and Manage Contributors  Recent Flowsheet Documentation  Taken 9/5/2024 0059 by Teagan Cruz RN  Self-Care Promotion:   independence encouraged   BADL personal objects within reach  Taken 9/4/2024 2058 by Teagan Cruz  RN  Medication Review/Management: medications reviewed  Self-Care Promotion:   independence encouraged   BADL personal objects within reach  Intervention: Promote Injury-Free Environment  Recent Flowsheet Documentation  Taken 9/5/2024 0441 by Teagan Cruz RN  Safety Promotion/Fall Prevention:   safety round/check completed   room organization consistent   nonskid shoes/slippers when out of bed   fall prevention program maintained  Taken 9/5/2024 0200 by Teagan Cruz RN  Safety Promotion/Fall Prevention:   safety round/check completed   room organization consistent   nonskid shoes/slippers when out of bed  Taken 9/5/2024 0059 by Teagan Cruz RN  Safety Promotion/Fall Prevention:   safety round/check completed   room organization consistent   nonskid shoes/slippers when out of bed   fall prevention program maintained  Taken 9/4/2024 2258 by Teagan Cruz RN  Safety Promotion/Fall Prevention:   safety round/check completed   room organization consistent   nonskid shoes/slippers when out of bed   fall prevention program maintained  Taken 9/4/2024 2058 by Teagan Cruz RN  Safety Promotion/Fall Prevention:   safety round/check completed   room organization consistent   nonskid shoes/slippers when out of bed   fall prevention program maintained     Problem: Skin Injury Risk Increased  Goal: Skin Health and Integrity  Outcome: Ongoing, Progressing  Intervention: Optimize Skin Protection  Recent Flowsheet Documentation  Taken 9/5/2024 0441 by Teagan Cruz RN  Head of Bed (HOB) Positioning: HOB at 20-30 degrees  Taken 9/5/2024 0200 by Teagan Cruz RN  Head of Bed (HOB) Positioning: HOB at 20-30 degrees  Taken 9/5/2024 0059 by Teagan Cruz RN  Pressure Reduction Techniques:   frequent weight shift encouraged   heels elevated off bed  Head of Bed (HOB) Positioning: HOB elevated  Skin Protection: adhesive use limited  Taken 9/4/2024 2258 by Teagan Cruz  RN  Head of Bed (HOB) Positioning: HOB elevated  Taken 9/4/2024 2058 by Teagan Cruz RN  Pressure Reduction Techniques:   frequent weight shift encouraged   heels elevated off bed  Head of Bed (HOB) Positioning: HOB elevated  Skin Protection: adhesive use limited     Problem: Adjustment to Illness (Sepsis/Septic Shock)  Goal: Optimal Coping  Outcome: Ongoing, Progressing  Intervention: Optimize Psychosocial Adjustment to Illness  Recent Flowsheet Documentation  Taken 9/5/2024 0059 by Teagan Cruz RN  Supportive Measures: active listening utilized  Family/Support System Care:   self-care encouraged   support provided  Taken 9/4/2024 2058 by Teagan Cruz RN  Supportive Measures: active listening utilized  Family/Support System Care:   self-care encouraged   support provided     Problem: Bleeding (Sepsis/Septic Shock)  Goal: Absence of Bleeding  Outcome: Ongoing, Progressing     Problem: Glycemic Control Impaired (Sepsis/Septic Shock)  Goal: Blood Glucose Level Within Desired Range  Outcome: Ongoing, Progressing     Problem: Infection Progression (Sepsis/Septic Shock)  Goal: Absence of Infection Signs and Symptoms  Outcome: Ongoing, Progressing  Intervention: Initiate Sepsis Management  Recent Flowsheet Documentation  Taken 9/5/2024 0059 by Teagan Cruz RN  Infection Prevention: single patient room provided  Taken 9/4/2024 2058 by Teagan Cruz RN  Infection Prevention: single patient room provided  Intervention: Promote Recovery  Recent Flowsheet Documentation  Taken 9/5/2024 0441 by Teagan Cruz RN  Activity Management: activity encouraged  Taken 9/5/2024 0059 by Teagan Cruz, RN  Activity Management: activity encouraged  Sleep/Rest Enhancement: relaxation techniques promoted  Taken 9/4/2024 2058 by Teagan Cruz RN  Activity Management: activity encouraged  Sleep/Rest Enhancement:   awakenings minimized   relaxation techniques promoted     Problem:  Nutrition Impaired (Sepsis/Septic Shock)  Goal: Optimal Nutrition Intake  Outcome: Ongoing, Progressing     Problem: Impaired Wound Healing  Goal: Optimal Wound Healing  Outcome: Ongoing, Progressing  Intervention: Promote Wound Healing  Recent Flowsheet Documentation  Taken 9/5/2024 0441 by Teagan Cruz, RN  Activity Management: activity encouraged  Taken 9/5/2024 0059 by Teagan Cruz, RN  Activity Management: activity encouraged  Sleep/Rest Enhancement: relaxation techniques promoted  Taken 9/4/2024 2058 by Teagan Cruz, RN  Activity Management: activity encouraged  Sleep/Rest Enhancement:   awakenings minimized   relaxation techniques promoted     Problem: Confusion Acute  Goal: Optimal Cognitive Function  Outcome: Ongoing, Progressing  Intervention: Minimize Contributing Factors  Recent Flowsheet Documentation  Taken 9/5/2024 0059 by Teagan Cruz RN  Reorientation Measures:   calendar in view   clock in view  Taken 9/4/2024 2058 by Teagan Cruz, RN  Sensory Stimulation Regulation: quiet environment promoted  Reorientation Measures:   calendar in view   clock in view   Goal Outcome Evaluation:  Plan of Care Reviewed With: patient, family        Progress: improving

## 2024-09-06 ENCOUNTER — APPOINTMENT (OUTPATIENT)
Dept: CT IMAGING | Facility: HOSPITAL | Age: 84
End: 2024-09-06
Payer: COMMERCIAL

## 2024-09-06 LAB
ALBUMIN SERPL-MCNC: 3.1 G/DL (ref 3.5–5.2)
ANION GAP SERPL CALCULATED.3IONS-SCNC: 11 MMOL/L (ref 5–15)
B PARAPERT DNA SPEC QL NAA+PROBE: NOT DETECTED
B PERT DNA SPEC QL NAA+PROBE: NOT DETECTED
BUN SERPL-MCNC: 30 MG/DL (ref 8–23)
BUN/CREAT SERPL: 18.2 (ref 7–25)
C PNEUM DNA NPH QL NAA+NON-PROBE: NOT DETECTED
CALCIUM SPEC-SCNC: 8.2 MG/DL (ref 8.6–10.5)
CHLORIDE SERPL-SCNC: 107 MMOL/L (ref 98–107)
CO2 SERPL-SCNC: 22 MMOL/L (ref 22–29)
CREAT SERPL-MCNC: 1.65 MG/DL (ref 0.76–1.27)
DEPRECATED RDW RBC AUTO: 47.4 FL (ref 37–54)
EGFRCR SERPLBLD CKD-EPI 2021: 40.2 ML/MIN/1.73
ERYTHROCYTE [DISTWIDTH] IN BLOOD BY AUTOMATED COUNT: 14.8 % (ref 12.3–15.4)
FLUAV SUBTYP SPEC NAA+PROBE: NOT DETECTED
FLUBV RNA ISLT QL NAA+PROBE: NOT DETECTED
GLUCOSE SERPL-MCNC: 104 MG/DL (ref 65–99)
HADV DNA SPEC NAA+PROBE: NOT DETECTED
HCOV 229E RNA SPEC QL NAA+PROBE: NOT DETECTED
HCOV HKU1 RNA SPEC QL NAA+PROBE: NOT DETECTED
HCOV NL63 RNA SPEC QL NAA+PROBE: NOT DETECTED
HCOV OC43 RNA SPEC QL NAA+PROBE: NOT DETECTED
HCT VFR BLD AUTO: 27.5 % (ref 37.5–51)
HGB BLD-MCNC: 8.9 G/DL (ref 13–17.7)
HMPV RNA NPH QL NAA+NON-PROBE: NOT DETECTED
HPIV1 RNA ISLT QL NAA+PROBE: NOT DETECTED
HPIV2 RNA SPEC QL NAA+PROBE: NOT DETECTED
HPIV3 RNA NPH QL NAA+PROBE: NOT DETECTED
HPIV4 P GENE NPH QL NAA+PROBE: NOT DETECTED
M PNEUMO IGG SER IA-ACNC: NOT DETECTED
MAGNESIUM SERPL-MCNC: 2 MG/DL (ref 1.6–2.4)
MCH RBC QN AUTO: 28.7 PG (ref 26.6–33)
MCHC RBC AUTO-ENTMCNC: 32.4 G/DL (ref 31.5–35.7)
MCV RBC AUTO: 88.7 FL (ref 79–97)
NT-PROBNP SERPL-MCNC: 7540 PG/ML (ref 0–1800)
PHOSPHATE SERPL-MCNC: 2.6 MG/DL (ref 2.5–4.5)
PLATELET # BLD AUTO: 177 10*3/MM3 (ref 140–450)
PMV BLD AUTO: 10 FL (ref 6–12)
POTASSIUM SERPL-SCNC: 3.6 MMOL/L (ref 3.5–5.2)
RBC # BLD AUTO: 3.1 10*6/MM3 (ref 4.14–5.8)
RHINOVIRUS RNA SPEC NAA+PROBE: NOT DETECTED
RSV RNA NPH QL NAA+NON-PROBE: NOT DETECTED
SARS-COV-2 RNA NPH QL NAA+NON-PROBE: NOT DETECTED
SODIUM SERPL-SCNC: 140 MMOL/L (ref 136–145)
URATE SERPL-MCNC: 8.2 MG/DL (ref 3.4–7)
VANCOMYCIN SERPL-MCNC: 16.4 MCG/ML (ref 5–40)
WBC NRBC COR # BLD AUTO: 8 10*3/MM3 (ref 3.4–10.8)

## 2024-09-06 PROCEDURE — 94799 UNLISTED PULMONARY SVC/PX: CPT

## 2024-09-06 PROCEDURE — 94664 DEMO&/EVAL PT USE INHALER: CPT

## 2024-09-06 PROCEDURE — 94761 N-INVAS EAR/PLS OXIMETRY MLT: CPT

## 2024-09-06 PROCEDURE — 83880 ASSAY OF NATRIURETIC PEPTIDE: CPT | Performed by: STUDENT IN AN ORGANIZED HEALTH CARE EDUCATION/TRAINING PROGRAM

## 2024-09-06 PROCEDURE — 97110 THERAPEUTIC EXERCISES: CPT

## 2024-09-06 PROCEDURE — 0202U NFCT DS 22 TRGT SARS-COV-2: CPT | Performed by: STUDENT IN AN ORGANIZED HEALTH CARE EDUCATION/TRAINING PROGRAM

## 2024-09-06 PROCEDURE — 84550 ASSAY OF BLOOD/URIC ACID: CPT

## 2024-09-06 PROCEDURE — 25010000002 FUROSEMIDE PER 20 MG: Performed by: INTERNAL MEDICINE

## 2024-09-06 PROCEDURE — 80069 RENAL FUNCTION PANEL: CPT

## 2024-09-06 PROCEDURE — 94762 N-INVAS EAR/PLS OXIMTRY CONT: CPT

## 2024-09-06 PROCEDURE — 85027 COMPLETE CBC AUTOMATED: CPT | Performed by: STUDENT IN AN ORGANIZED HEALTH CARE EDUCATION/TRAINING PROGRAM

## 2024-09-06 PROCEDURE — 25010000002 CEFTRIAXONE PER 250 MG: Performed by: HOSPITALIST

## 2024-09-06 PROCEDURE — 71250 CT THORAX DX C-: CPT

## 2024-09-06 PROCEDURE — 80202 ASSAY OF VANCOMYCIN: CPT | Performed by: STUDENT IN AN ORGANIZED HEALTH CARE EDUCATION/TRAINING PROGRAM

## 2024-09-06 PROCEDURE — 97530 THERAPEUTIC ACTIVITIES: CPT

## 2024-09-06 PROCEDURE — 83735 ASSAY OF MAGNESIUM: CPT

## 2024-09-06 PROCEDURE — 94760 N-INVAS EAR/PLS OXIMETRY 1: CPT

## 2024-09-06 PROCEDURE — 63710000001 PREDNISONE PER 5 MG: Performed by: HOSPITALIST

## 2024-09-06 RX ORDER — POTASSIUM CHLORIDE 750 MG/1
40 TABLET, FILM COATED, EXTENDED RELEASE ORAL ONCE
Status: COMPLETED | OUTPATIENT
Start: 2024-09-06 | End: 2024-09-06

## 2024-09-06 RX ORDER — FUROSEMIDE 10 MG/ML
60 INJECTION INTRAMUSCULAR; INTRAVENOUS ONCE
Status: COMPLETED | OUTPATIENT
Start: 2024-09-06 | End: 2024-09-06

## 2024-09-06 RX ORDER — FUROSEMIDE 10 MG/ML
40 INJECTION INTRAMUSCULAR; INTRAVENOUS ONCE
Status: DISCONTINUED | OUTPATIENT
Start: 2024-09-06 | End: 2024-09-06

## 2024-09-06 RX ADMIN — POTASSIUM CHLORIDE 40 MEQ: 750 TABLET, EXTENDED RELEASE ORAL at 17:31

## 2024-09-06 RX ADMIN — BENZONATATE 200 MG: 100 CAPSULE ORAL at 07:31

## 2024-09-06 RX ADMIN — PREDNISONE 12.5 MG: 10 TABLET ORAL at 08:48

## 2024-09-06 RX ADMIN — Medication 10 ML: at 20:46

## 2024-09-06 RX ADMIN — CEFTRIAXONE 2000 MG: 2 INJECTION, POWDER, FOR SOLUTION INTRAMUSCULAR; INTRAVENOUS at 23:06

## 2024-09-06 RX ADMIN — TAMSULOSIN HYDROCHLORIDE 0.4 MG: 0.4 CAPSULE ORAL at 08:48

## 2024-09-06 RX ADMIN — IPRATROPIUM BROMIDE AND ALBUTEROL SULFATE 3 ML: .5; 3 SOLUTION RESPIRATORY (INHALATION) at 07:04

## 2024-09-06 RX ADMIN — MODAFINIL 400 MG: 100 TABLET ORAL at 08:49

## 2024-09-06 RX ADMIN — GUAIFENESIN 600 MG: 600 TABLET, EXTENDED RELEASE ORAL at 20:46

## 2024-09-06 RX ADMIN — GUAIFENESIN 600 MG: 600 TABLET, EXTENDED RELEASE ORAL at 08:48

## 2024-09-06 RX ADMIN — ATORVASTATIN CALCIUM 10 MG: 20 TABLET, FILM COATED ORAL at 08:48

## 2024-09-06 RX ADMIN — DONEPEZIL HYDROCHLORIDE 10 MG: 10 TABLET, FILM COATED ORAL at 08:48

## 2024-09-06 RX ADMIN — CARBIDOPA AND LEVODOPA 2 TABLET: 50; 200 TABLET, EXTENDED RELEASE ORAL at 08:48

## 2024-09-06 RX ADMIN — TAMSULOSIN HYDROCHLORIDE 0.4 MG: 0.4 CAPSULE ORAL at 20:46

## 2024-09-06 RX ADMIN — IPRATROPIUM BROMIDE AND ALBUTEROL SULFATE 3 ML: .5; 3 SOLUTION RESPIRATORY (INHALATION) at 19:23

## 2024-09-06 RX ADMIN — POLYETHYLENE GLYCOL 3350 17 G: 17 POWDER, FOR SOLUTION ORAL at 09:01

## 2024-09-06 RX ADMIN — IPRATROPIUM BROMIDE AND ALBUTEROL SULFATE 3 ML: .5; 3 SOLUTION RESPIRATORY (INHALATION) at 15:07

## 2024-09-06 RX ADMIN — IPRATROPIUM BROMIDE AND ALBUTEROL SULFATE 3 ML: .5; 3 SOLUTION RESPIRATORY (INHALATION) at 10:48

## 2024-09-06 RX ADMIN — FUROSEMIDE 60 MG: 10 INJECTION, SOLUTION INTRAMUSCULAR; INTRAVENOUS at 17:30

## 2024-09-06 RX ADMIN — ASPIRIN 81 MG: 81 TABLET, COATED ORAL at 08:49

## 2024-09-06 RX ADMIN — MEMANTINE HYDROCHLORIDE 10 MG: 10 TABLET, FILM COATED ORAL at 08:48

## 2024-09-06 NOTE — CASE MANAGEMENT/SOCIAL WORK
Continued Stay Note  [facility]     Patient Name: Allan Kruger  MRN: [id number]  Today's Date: 9/6/2024    Admit Date: 9/2/2024    Plan: Home with family and 24/7 caregivers   Discharge Plan       Row Name 09/06/24 1457       Plan    Plan Home with family and 24/7 caregivers                   Discharge Codes    No documentation.                 Expected Discharge Date and Time       Expected Discharge Date Expected Discharge Time    Sep 6, 2024               Nya Ellis RN

## 2024-09-06 NOTE — CONSULTS
Patient Identification:  Allan Kruger  84 y.o.  male  1940  [id number]          LOS 4    Requesting physician:   Jimmie Molina MD    Reason for Consultation:    Interstitial lung disease    History of Present Illness:   84-year-old male with a history of interstitial lung disease, polymyalgia rheumatica on chronic prednisone, chronic kidney disease stage IIIb, rheumatoid arthritis, Parkinson's/Alzheimer's, history of renal cancer with solitary kidney (status post left nephrectomy), anemia who presented to the hospital with altered mental status and hypotension.    Patient initially presented to the hospital on 9/2 for altered mental status and hypotension and found to be septic secondary to left lower extremity cellulitis.  Patient with nonhealing wound on the left heel without evidence of osteomyelitis on MRI.  Over the past few days since admission has been having worsening respiratory status.  Increasing cough and sputum production.  Unable to provide me with significant history due to mental status and underlying Alzheimer's/Parkinson's disease.  History was obtained from wife at bedside and daughter on the phone who is a physiatrist.  Patient has a history of interstitial lung disease thought to be secondary to PMR/rheumatoid arthritis and currently only on prednisone therapy.  Was tried on hydroxychloroquine in 2024 by his rheumatologist however did not tolerate.  Used to follow with a pulmonologist in [place] however is trying to establish care in [place].  Was seen by [facility] for pulmonary function testing and 6-minute walk test in May 2024 however no clinic note is available in Care Everywhere and results of those 2 testing is not available either.  No previous history of cardiac disease.  Does have increasing cough when he lays flat.  Improved when he sits up in the chair.    Past Medical History:  Past Medical History:   Diagnosis Date    Alzheimer disease     CKD  (chronic kidney disease)     Hyperlipidemia     Interstitial lung disease     Parkinson disease     PMR (polymyalgia rheumatica)     Renal cancer        Past Surgical History:  Past Surgical History:   Procedure Laterality Date    NEPHRECTOMY Right         Home Meds:  Medications Prior to Admission   Medication Sig Dispense Refill Last Dose    aspirin 81 MG EC tablet Take 1 tablet by mouth Daily.       atorvastatin (LIPITOR) 10 MG tablet Take 1 tablet by mouth Daily.       carbidopa-levodopa CR (SINEMET CR)  MG per CR tablet Take 2 tablets by mouth Daily.       cholecalciferol (VITAMIN D3) 1.25 MG (36649 UT) capsule Take 5,000 Units by mouth Daily.       docusate sodium (COLACE) 100 MG capsule Take 1 capsule by mouth Daily.       donepezil (ARICEPT) 10 MG tablet Take 1 tablet by mouth Daily.       fluticasone (FLONASE) 50 MCG/ACT nasal spray 2 sprays into the nostril(s) as directed by provider Daily.       guaiFENesin (MUCINEX) 600 MG 12 hr tablet Take 1 tablet by mouth 2 (Two) Times a Day.       icosapent ethyl (VASCEPA) 1 g capsule capsule Take 2 g by mouth Daily.       memantine (NAMENDA) 10 MG tablet Take 1 tablet by mouth Daily.       modafinil (PROVIGIL) 100 MG tablet Take 4 tablets by mouth Daily.       predniSONE (DELTASONE) 5 MG tablet Take 1 tablet by mouth Daily. Take 12.5 MG daily       probenecid (BENEMID) 500 MG tablet Take 1 tablet by mouth 2 (Two) Times a Day.       tamsulosin (FLOMAX) 0.4 MG capsule 24 hr capsule Take 1 capsule by mouth 2 (Two) Times a Day.            Allergies:  Allergies   Allergen Reactions    Augmentin [Amoxicillin-Pot Clavulanate] Rash    Neosporin [Bacitracin-Polymyxin B] Rash       Social History:   Social History     Socioeconomic History    Marital status:    Tobacco Use    Smoking status: Unknown   Vaping Use    Vaping status: Never Used   Substance and Sexual Activity    Drug use: Never    Sexual activity: Defer       Family History:  History reviewed. No  "pertinent family history.    Review of Systems:  12 point review of systems performed and all else negative except as per HPI above.    Objective:    PHYSICAL EXAM:    /62 (BP Location: Right arm, Patient Position: Lying)   Pulse 65   Temp 97.9 °F (36.6 °C) (Oral)   Resp 18   Ht 193 cm (76\") Comment: from EMS record in media tab  Wt 114 kg (251 lb 12.3 oz)   SpO2 91%   BMI 30.65 kg/m²  Body mass index is 30.65 kg/m². 91% 114 kg (251 lb 12.3 oz)    General: Alert, slow to respond  HEENT: NC/AT, EOMI, MMM  Neck: Supple, trachea midline  Cardiac: RRR, no murmur, gallops, rubs  Pulmonary: Fine and coarse crackles bilaterally, predominantly at the base, mild expiratory wheeze in the upper lung fields  GI: Soft, non-tender, non-distended, normal bowel sounds  Extremities: Warm, well perfused, 2+ LE edema  Skin: no visible rash  Neuro: CN II - XII grossly intact  Psychiatry: Normal mood and affect    Lab Review:   Results from last 7 days   Lab Units 09/06/24  0557 09/05/24  0427 09/04/24  0545 09/03/24  0333 09/02/24  2139   WBC 10*3/mm3 8.00 13.94* 17.19* 23.05* 25.78*   HEMOGLOBIN g/dL 8.9* 9.0* 9.2* 9.8* 10.9*   PLATELETS 10*3/mm3 177 183 188 183 190     Results from last 7 days   Lab Units 09/06/24  0557 09/05/24  0427 09/04/24  0545 09/03/24  0333 09/02/24  2221   SODIUM mmol/L 140 141 138 134* 130*   POTASSIUM mmol/L 3.6 4.0 3.8 4.4 5.4*   CHLORIDE mmol/L 107 106 104 98 96*   CO2 mmol/L 22.0 21.0* 18.2* 18.3* 19.0*   BUN mg/dL 30* 34* 37* 38* 38*   CREATININE mg/dL 1.65* 2.04* 2.22* 3.05* 3.13*   GLUCOSE mg/dL 104* 92 113* 113* 141*   CALCIUM mg/dL 8.2* 8.3* 8.0* 7.9* 8.3*   MAGNESIUM mg/dL 2.0 2.1  --   --  1.8   PHOSPHORUS mg/dL 2.6 3.2  --   --   --    Estimated Creatinine Clearance: 44.4 mL/min (A) (by C-G formula based on SCr of 1.65 mg/dL (H)).    Results from last 7 days   Lab Units 09/06/24  0557 09/05/24  0427 09/04/24  0545 09/03/24  2020 09/03/24  1354 09/02/24  2325 09/02/24  2221   AST " (SGOT) U/L  --   --   --   --   --   --  29   ALT (SGPT) U/L  --   --   --   --   --   --  <5   PROCALCITONIN ng/mL  --   --   --   --   --   --  48.30*   LACTATE mmol/L  --   --  1.5 2.5* 2.2*   < >  --    FERRITIN ng/mL  --  323.00  --   --   --   --   --    PLATELETS 10*3/mm3 177 183 188  --   --    < >  --     < > = values in this interval not displayed.              Imaging reviewed  Chest imaging from this hospitalization reviewed.       Assessment / Recommendations:    Interstitial lung disease (UIP pattern)  Polymyalgia rheumatica on chronic prednisone (since 2019)  Rheumatoid arthritis   Altered mental status  Left lower extremity cellulitis  Volume overload  Acute kidney injury on chronic kidney disease stage IIIb  History of renal cancer status post left nephrectomy  Alzheimer's dementia  Parkinson's disease    -Patient initially presented with altered mental status and found to have sepsis from left lower extremity cellulitis.  Worsening respiratory status over the past few days.  -On examination he appears overloaded, CT chest with new groundglass opacities overlying underlying fibrotic changes with bronchiectasis.  BNP also markedly elevated.  -Will obtain echocardiogram for evaluation of cardiac function  -Diuresis with Lasix IV, goal net negative.  I expect that if his new symptoms are quickly improved with diuresis, this may all be fluid related.  If not we will need to consider increasing steroids for ILD flare.  -Respiratory viral panel negative, MRSA nares negative.  Infectious workup no growth to date.  Remains on empiric antibiotics with ceftriaxone and vancomycin since 9/3.  -Saturations appropriate, currently on room air.  -Patient will need to establish care with pulmonary as an outpatient.  -Patient following with Dr. Anisha Soliman with rheumatology at [facility].  Most recently seen in August 2024 at which time his prednisone was decreased to 12.5 mg daily with consideration of leflunomide  therapy.    Thank you for allowing me to participate in the care of this patient.  I will continue to follow along with you.    Kar Silva MD  [facility], [facility]  Pulmonary and Critical Care Medicine, Interventional Pulmonology    9/6/2024  16:22 EDT    Parts of this note may be an electronic transcription/translation of spoken language to printed text using the Dragon dictation system.

## 2024-09-06 NOTE — PROGRESS NOTES
Name: Allan Kruger ADMIT: 2024   : 1940  PCP: Provider, No Known    MRN: [id number] LOS: 4 days   AGE/SEX: 84 y.o. male  ROOM: [facility]     Subjective   Subjective     His cough has become noticeably worse.  He has a deep, hacking cough.  It is also more frequent.       Objective   Objective   Vital Signs  Temp:  [97.9 °F (36.6 °C)-98.6 °F (37 °C)] 97.9 °F (36.6 °C)  Heart Rate:  [61-69] 65  Resp:  [18-24] 18  BP: (125-147)/(62-73) 147/62  SpO2:  [91 %-99 %] 91 %  on   ;   Device (Oxygen Therapy): room air  Body mass index is 30.65 kg/m².  Physical Exam  Vitals reviewed.   Constitutional:       General: He is not in acute distress.     Appearance: He is obese.   Cardiovascular:      Rate and Rhythm: Normal rate.   Pulmonary:      Effort: No respiratory distress.      Breath sounds: Wheezing and rhonchi present.   Abdominal:      General: There is no distension.      Palpations: Abdomen is soft.   Musculoskeletal:      Right lower leg: Edema present.      Left lower leg: Edema present.   Skin:     General: Skin is warm and dry.      Comments: LLE remains pink and warm   Neurological:      General: No focal deficit present.      Mental Status: He is alert.   Psychiatric:      Comments: Flat affect.  Does not say much       Results Review     I reviewed the patient's new clinical results.  Results from last 7 days   Lab Units 24  0545 24  0333   WBC 10*3/mm3 8.00 13.94* 17.19* 23.05*   HEMOGLOBIN g/dL 8.9* 9.0* 9.2* 9.8*   PLATELETS 10*3/mm3 177 183 188 183     Results from last 7 days   Lab Units 24  0557 24  0545 24  0333   SODIUM mmol/L 140 141 138 134*   POTASSIUM mmol/L 3.6 4.0 3.8 4.4   CHLORIDE mmol/L 107 106 104 98   CO2 mmol/L 22.0 21.0* 18.2* 18.3*   BUN mg/dL 30* 34* 37* 38*   CREATININE mg/dL 1.65* 2.04* 2.22* 3.05*   GLUCOSE mg/dL 104* 92 113* 113*   EGFR mL/min/1.73 40.2* 31.2* 28.1* 19.2*     Results from last 7  "days   Lab Units 09/06/24  0557 09/05/24 0427 09/02/24  2221   ALBUMIN g/dL 3.1* 3.2* 3.3*   BILIRUBIN mg/dL  --   --  0.5   ALK PHOS U/L  --   --  77   AST (SGOT) U/L  --   --  29   ALT (SGPT) U/L  --   --  <5     Results from last 7 days   Lab Units 09/06/24  0557 09/05/24  0427 09/04/24  0545 09/03/24  0333 09/02/24  2221   CALCIUM mg/dL 8.2* 8.3* 8.0* 7.9* 8.3*   ALBUMIN g/dL 3.1* 3.2*  --   --  3.3*   MAGNESIUM mg/dL 2.0 2.1  --   --  1.8   PHOSPHORUS mg/dL 2.6 3.2  --   --   --      Results from last 7 days   Lab Units 09/04/24  0545 09/03/24 2020 09/03/24  1354 09/03/24  1002 09/02/24  2325 09/02/24  2221   PROCALCITONIN ng/mL  --   --   --   --   --  48.30*   LACTATE mmol/L 1.5 2.5* 2.2* 2.8*   < >  --     < > = values in this interval not displayed.     No results found for: \"HGBA1C\", \"POCGLU\"    MRI Foot Left Without Contrast    Result Date: 9/6/2024   1. Focal wound at the medial and plantar junction of the midfoot and forefoot extending through the subcutaneous fat and with focal extension through the medial plantar fascia to the superficial aspect of the right thigh musculature. Diffuse soft tissue edema with no well-formed or drainable fluid collection to suggest abscess. 2. No evidence of osteomyelitis. 3. 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 4. Moderate to severe chondromalacia/DJD at the great toe MTP joint. 5. Severe diffuse muscular fatty atrophy, likely neuropathic, with mild diffuse muscular edema that could also be neuropathic or could represent myositis.     This report was finalized on 9/6/2024 7:47 AM by Silvestre Mejia MD on Workstation: [id number]       I have personally reviewed all medications:  Scheduled Medications  aspirin, 81 mg, Oral, Daily  atorvastatin, 10 mg, Oral, Daily  carbidopa-levodopa CR, 2 tablet, Oral, Daily  cefTRIAXone, 2,000 mg, Intravenous, Q24H  donepezil, 10 mg, Oral, Daily  fluticasone, 2 spray, Nasal, " Daily  guaiFENesin, 600 mg, Oral, BID  ipratropium-albuterol, 3 mL, Nebulization, 4x Daily - RT  memantine, 10 mg, Oral, Daily  modafinil, 400 mg, Oral, Daily  predniSONE, 12.5 mg, Oral, Daily  sodium chloride, 10 mL, Intravenous, Q12H  tamsulosin, 0.4 mg, Oral, BID  vancomycin, 1,000 mg, Intravenous, Q24H    Infusions  Pharmacy to dose vancomycin,     Diet  Diet: Cardiac; Healthy Heart (2-3 Na+); Fluid Consistency: Thin (IDDSI 0)         Assessment/Plan     Active Hospital Problems    Diagnosis  POA    **Sepsis [A41.9]  Yes    PMR (polymyalgia rheumatica) [M35.3]  Yes    Current chronic use of systemic steroids [Z79.52]  Not Applicable    Diarrhea [R19.7]  Yes    JASS (acute kidney injury) [N17.9]  Yes    Stage 3b chronic kidney disease [N18.32]  Yes    Dementia without behavioral disturbance [F03.90]  Yes    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       84 y.o. male with history of PMR on chronic prednisone, CKD 3B, seronegative RA, Parkinson's, UIP and Alzheimer's dementia presenting with acutely altered mental status and hypotension.    Sepsis: Suspected to be due to LLE cellulitis.  Blood cultures negative so far and MRSA swab negative.  -  nonhealing wound on his left heel noted. Mri obtained which shows a local wound at the medial and plantar junction of the midfoot and forefoot extending through the subcutaneous fat and with focal extension.  Medial plantar fascia.  No evidence of osteomyelitis.    - Check Dopplers- some superficial thrombophlebitis noted however no DVTs. Already on aspirin    Interstitial lung disease  On prednisone 12.5mg. CT chest ordered. Wheezing on exam. Suspect exacerbation of ILD. Will ask pulmonology to evaluate.     JASS/CKD 3B, improving. Nephrology following    PMR: On home dose prednisone    Diarrhea: C. difficile  toxin negative.     SCDs for DVT prophylaxis.  Palliative consulted.  Await their recommendations  Dispo: TBD.  Not for a few days at least      Jimmie Molina MD  [facility]  09/06/24  13:34 EDT

## 2024-09-06 NOTE — PROGRESS NOTES
"[facility]: Vancomycin Monitoring Note    Allan Kruger is a 84 y.o. male who is on day 3/5 of pharmacy to dose vancomycin for Skin and Soft Tissue.    Previous Vancomycin Dose:   pulse dosing - last dose 1000 mg  9/5    Updated Cultures and Sensitivities: blood cultures ng x3days  Results from last 7 days   Lab Units 09/06/24  0557 09/05/24  0427 09/04/24  0545   VANCOMYCIN RM mcg/mL 16.40 15.00 13.20     Vitals/Labs  Ht: 193 cm (76\"); Wt: 114 kg (251 lb 12.3 oz)   Temp Readings from Last 1 Encounters:   09/04/24 98.1 °F (36.7 °C) (Oral)     Estimated Creatinine Clearance: 44.4 mL/min (A) (by C-G formula based on SCr of 1.65 mg/dL (H)).       Results from last 7 days   Lab Units 09/06/24 0557 09/05/24 0427 09/04/24  0545   CREATININE mg/dL 1.65* 2.04* 2.22*   WBC 10*3/mm3 8.00 13.94* 17.19*     Assessment/Plan    Scr is improving daily   will change dosing to Vancomycin 1000mg iv q24h   expected AUC= 436  Next Level Date and Time: Vancomycin trough on 9/7  @ 1130  We will continue to monitor patient changes and renal function     Thank you for involving pharmacy in this patient's care. Please contact pharmacy with any questions or concerns.      Vania Perla, [facility]    9/5/2024 15:07 EDT               "

## 2024-09-06 NOTE — PLAN OF CARE
Goal Outcome Evaluation:  Plan of Care Reviewed With: patient        Progress: improving  Outcome Evaluation: Pt continues to appear more alert compared to eval date, however still disoriented to place, situation, and time. Pt required Andrew x 2 to complete supine to sit bed mobility. Required cuing for sequencing to bring legs off EOB, reach for bed rail, and push up through UE. Required assistance to reach seated position from sidelying. Able to scoot laterally and forward when instructed CGA. Once in sitting, completed 20 ankle pumps, 10 LAQ, and 10 seated hip marches. No difference noted in strength L vs. R. ModA x 2 for STS transfer. Required cuing for hand placement. Once in standing, presented with severely forward posture with knees flexed. Required cuing to straighten legs and resume upright posture. Andrew x 2 while in static standing position. Took about 8 side steps to the R to reach chair with Andrew x 2 and rolling walker. RT entered once PT session concluded. Wife at bedside, caretaker to arrive later this afternoon. CT scan planned for today. Continued skilled therapy to increase strength and endurance required for increased independence and mobility tolerance. Rec D/C to home with assistance or skilled nursing facility.      Anticipated Discharge Disposition (PT): home with assist, skilled nursing facility

## 2024-09-06 NOTE — PLAN OF CARE
Goal Outcome Evaluation:  Plan of Care Reviewed With: patient        Progress: improving  Outcome Evaluation: Patient alert and oriented to person and year.  MRI Left Foot performed.  Family at the bedside.  VSS.  No requests at this time.  Call light within reach.

## 2024-09-06 NOTE — THERAPY TREATMENT NOTE
Patient Name: Allan Kruger  : 1940    MRN: [id number]                              Today's Date: 2024       Admit Date: 2024    Visit Dx:     ICD-10-CM ICD-9-CM   1. Sepsis, due to unspecified organism, unspecified whether acute organ dysfunction present  A41.9 038.9     995.91   2. Acute kidney injury  N17.9 584.9   3. Acute encephalopathy  G93.40 348.30   4. 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    Parkinson disease    Pulmonary fibrosis     Past Medical History:   Diagnosis Date    Alzheimer disease     CKD (chronic kidney disease)     Hyperlipidemia     Interstitial lung disease     Parkinson disease     PMR (polymyalgia rheumatica)     Renal cancer      Past Surgical History:   Procedure Laterality Date    NEPHRECTOMY Right       General Information       Row Name 24 1050          Physical Therapy Time and Intention    Document Type therapy note (daily note)  -EF (r) CK (t) EF (c)     Mode of Treatment individual therapy;physical therapy  -EF (r) CK (t) EF (c)       Row Name 24 1050          General Information    Existing Precautions/Restrictions fall  -EF (r) CK (t) EF (c)       Row Name 24 1050          Cognition    Orientation Status (Cognition) oriented to;person  -EF (r) CK (t) EF (c)       Row Name 24 1050          Safety Issues, Functional Mobility    Impairments Affecting Function (Mobility) endurance/activity tolerance;postural/trunk control;strength;balance  -EF (r) CK (t) EF (c)               User Key  (r) = Recorded By, (t) = Taken By, (c) = Cosigned By      Initials Name Provider Type    EF Carin Ferguson, PT Physical Therapist    Nan Slade, PT  Student PT Student                   Mobility       Row Name 09/06/24 1050          Bed Mobility    Bed Mobility supine-sit  -EF (r) CK (t) EF (c)     Supine-Sit [place] (Bed Mobility) 2 person assist;verbal cues;moderate assist (50% patient effort)  -EF (r) CK (t) EF (c)     Assistive Device (Bed Mobility) bed rails;head of bed elevated  -EF (r) CK (t) EF (c)     Comment, (Bed Mobility) verbal cues for sequencing and hand placement; increased time to complete task  -EF (r) CK (t) EF (c)       Row Name 09/06/24 1050          Sit-Stand Transfer    Sit-Stand [place] (Transfers) 2 person assist;verbal cues;moderate assist (50% patient effort)  -EF (r) CK (t) EF (c)     Assistive Device (Sit-Stand Transfers) walker, front-wheeled  -EF (r) CK (t) EF (c)     Comment, (Sit-Stand Transfer) cues for hand placement, straighten knees, and bring chest up/look up to bring COM on top of ROSAURA; STS completed from elevated bed surface  -EF (r) CK (t) EF (c)       Row Name 09/06/24 1050          Gait/Stairs (Locomotion)    [place] Level (Gait) minimum assist (75% patient effort);2 person assist;verbal cues  -EF (r) CK (t) EF (c)     Assistive Device (Gait) walker, front-wheeled  -EF (r) CK (t) EF (c)     Deviations/Abnormal Patterns (Gait) demario decreased;stride length decreased  -EF (r) CK (t) EF (c)     Bilateral Gait Deviations forward flexed posture  -EF (r) CK (t) EF (c)     Left Sided Gait Deviations decreased knee extension  -EF (r) CK (t) EF (c)     Right Sided Gait Deviations decreased knee extension  -EF (r) CK (t) EF (c)     Comment, (Gait/Stairs) about 8 small side steps to go from EOB to chair; required cuing to maintain upright posture and assistance with negotiating walker  -EF (r) CK (t) EF (c)               User Key  (r) = Recorded By, (t) = Taken By, (c) = Cosigned By      Initials Name Provider Type    EF Carin Ferguson, PT Physical Therapist    CK Burnett, Nan, PT Student PT Student                    Obj/Interventions       Row Name 09/06/24 1056          Motor Skills    Therapeutic Exercise --  20 ankle pumps, 10 LAQ, 10 seated marches  -EF (r) CK (t) EF (c)       Row Name 09/06/24 1056          Balance    Balance Assessment sitting static balance;standing static balance;standing dynamic balance  -EF (r) CK (t) EF (c)     Static Sitting Balance contact guard  -EF (r) CK (t) EF (c)     Position, Sitting Balance sitting edge of bed  -EF (r) CK (t) EF (c)     Static Standing Balance contact guard;2-person assist  -EF (r) CK (t) EF (c)     Dynamic Standing Balance 2-person assist;minimal assist  -EF (r) CK (t) EF (c)     Position/Device Used, Standing Balance walker, front-wheeled  -EF (r) CK (t) EF (c)               User Key  (r) = Recorded By, (t) = Taken By, (c) = Cosigned By      Initials Name Provider Type    EF Carin Ferguson, PT Physical Therapist    Nan Slade, PT Student PT Student                   Goals/Plan    No documentation.                  Clinical Impression       Row Name 09/06/24 1057          Pain    Pretreatment Pain Rating 0/10 - no pain  -EF (r) CK (t) EF (c)     Posttreatment Pain Rating 0/10 - no pain  -EF (r) CK (t) EF (c)       Row Name 09/06/24 1057          Plan of Care Review    Plan of Care Reviewed With patient  -EF (r) CK (t) EF (c)     Progress improving  -EF (r) CK (t) EF (c)     Outcome Evaluation Pt continues to appear more alert compared to eval date, however still disoriented to place, situation, and time. Pt required Andrew x 2 to complete supine to sit bed mobility. Required cuing for sequencing to bring legs off EOB, reach for bed rail, and push up through UE. Required assistance to reach seated position from sidelying. Able to scoot laterally and forward when instructed CGA. Once in sitting, completed 20 ankle pumps, 10 LAQ, and 10 seated hip marches. No difference noted in strength L vs. R. ModA x 2 for STS transfer. Required cuing for hand placement.  Once in standing, presented with severely forward posture with knees flexed. Required cuing to straighten legs and resume upright posture. Andrew x 2 while in static standing position. Took about 8 side steps to the R to reach chair with Andrew x 2 and rolling walker. RT entered once PT session concluded. Wife at bedside, caretaker to arrive later this afternoon. CT scan planned for today. Continued skilled therapy to increase strength and endurance required for increased independence and mobility tolerance. Rec D/C to home with assistance or skilled nursing facility.  -EF (r) CK (t) EF (c)       Row Name 09/06/24 1057          Therapy Assessment/Plan (PT)    Rehab Potential (PT) good, to achieve stated therapy goals  -EF (r) CK (t) EF (c)     Criteria for Skilled Interventions Met (PT) yes;meets criteria;skilled treatment is necessary  -EF (r) CK (t) EF (c)     Therapy Frequency (PT) 5 times/wk  -EF (r) CK (t) EF (c)       Row Name 09/06/24 1057          Positioning and Restraints    Pre-Treatment Position in bed  -EF (r) CK (t) EF (c)     Post Treatment Position chair  -EF (r) CK (t) EF (c)     In Chair with family/caregiver;reclined;legs elevated;call light within reach;encouraged to call for assist;exit alarm on;with other staff  respiratory therapy entered after PT session  -EF (r) CK (t) EF (c)               User Key  (r) = Recorded By, (t) = Taken By, (c) = Cosigned By      Initials Name Provider Type    EF Carin Ferguson, PT Physical Therapist    Nan Slade, PT Student PT Student                   Outcome Measures       Row Name 09/06/24 1109          How much help from another person do you currently need...    Turning from your back to your side while in flat bed without using bedrails? 2  -EF (r) CK (t) EF (c)     Moving from lying on back to sitting on the side of a flat bed without bedrails? 2  -EF (r) CK (t) EF (c)     Moving to and from a bed to a chair (including a wheelchair)? 2  -EF (r) CK  (t) EF (c)     Standing up from a chair using your arms (e.g., wheelchair, bedside chair)? 2  -EF (r) CK (t) EF (c)     Climbing 3-5 steps with a railing? 1  -EF (r) CK (t) EF (c)     To walk in hospital room? 2  -EF (r) CK (t) EF (c)     AM-PAC 6 Clicks Score (PT) 11  -EF (r) CK (t)     Highest Level of Mobility Goal 4 --> Transfer to chair/commode  -EF (r) CK (t)       Row Name 09/06/24 1106          Functional Assessment    Outcome Measure Options AM-PAC 6 Clicks Basic Mobility (PT)  -EF (r) CK (t) EF (c)               User Key  (r) = Recorded By, (t) = Taken By, (c) = Cosigned By      Initials Name Provider Type     Carin Ferguson, PT Physical Therapist    Nan Slade, PT Student PT Student                                 Physical Therapy Education       Title: PT OT SLP Therapies (In Progress)       Topic: Physical Therapy (In Progress)       Point: Mobility training (Done)       Learning Progress Summary             Patient Acceptance, E, NR,DU by  at 9/6/2024 1107    Acceptance, E, NR by CK at 9/5/2024 1536    Acceptance, E, NR by  at 9/4/2024 1112                         Point: Home exercise program (Not Started)       Learner Progress:  Not documented in this visit.              Point: Body mechanics (In Progress)       Learning Progress Summary             Patient Acceptance, E, NR by  at 9/4/2024 1112                         Point: Precautions (Not Started)       Learner Progress:  Not documented in this visit.                              User Key       Initials Effective Dates Name Provider Type Discipline     05/31/24 -  Carin Ferguson, PT Physical Therapist PT     08/22/24 -  Nan Burnett, PT Student PT Student PT                  PT Recommendation and Plan     Plan of Care Reviewed With: patient  Progress: improving  Outcome Evaluation: Pt continues to appear more alert compared to eval date, however still disoriented to place, situation, and time. Pt required Andrew x 2  to complete supine to sit bed mobility. Required cuing for sequencing to bring legs off EOB, reach for bed rail, and push up through UE. Required assistance to reach seated position from sidelying. Able to scoot laterally and forward when instructed CGA. Once in sitting, completed 20 ankle pumps, 10 LAQ, and 10 seated hip marches. No difference noted in strength L vs. R. ModA x 2 for STS transfer. Required cuing for hand placement. Once in standing, presented with severely forward posture with knees flexed. Required cuing to straighten legs and resume upright posture. Andrew x 2 while in static standing position. Took about 8 side steps to the R to reach chair with Andrew x 2 and rolling walker. RT entered once PT session concluded. Wife at bedside, caretaker to arrive later this afternoon. CT scan planned for today. Continued skilled therapy to increase strength and endurance required for increased independence and mobility tolerance. Rec D/C to home with assistance or skilled nursing facility.     Time Calculation:         PT Charges       Row Name 09/06/24 1107             Time Calculation    Start Time 1020  -EF (r) CK (t) EF (c)      Stop Time 1048  -EF (r) CK (t) EF (c)      Time Calculation (min) 28 min  -EF (r) CK (t)      PT Received On 09/06/24  -EF (r) CK (t) EF (c)      PT - Next Appointment 09/09/24  -EF         Time Calculation- PT    Total Timed Code Minutes- PT 28 minute(s)  -EF (r) CK (t) EF (c)         Timed Charges    62715 - PT Therapeutic Exercise Minutes 8  -EF (r) CK (t) EF (c)      74079 - PT Therapeutic Activity Minutes 20  -EF (r) CK (t) EF (c)         Total Minutes    Timed Charges Total Minutes 28  -EF (r) CK (t)       Total Minutes 28  -EF (r) CK (t)                User Key  (r) = Recorded By, (t) = Taken By, (c) = Cosigned By      Initials Name Provider Type    Carin Serrano, PT Physical Therapist    Nan Slade, PT Student PT Student                  Therapy Charges for  Today       Code Description Service Date Service Provider Modifiers Qty    40410330498 HC PT THERAPEUTIC ACT EA 15 MIN 9/5/2024 Nan Burnett, PT Student GP 1    [id number]  PT THER PROC EA 15 MIN 9/6/2024 Nan Burnett, PT Student GP 1    [id number]  PT THERAPEUTIC ACT EA 15 MIN 9/6/2024 Nan Burnett, PT Student GP 1            PT G-Codes  Outcome Measure Options: AM-PAC 6 Clicks Basic Mobility (PT)  AM-PAC 6 Clicks Score (PT): 11  PT Discharge Summary  Anticipated Discharge Disposition (PT): home with assist, skilled nursing facility    Nan Burnett, PT Student  9/6/2024

## 2024-09-06 NOTE — PROGRESS NOTES
[facility] Progress Note      Patient Name: Allan Kruger  : 1940  MRN: [id number]  Primary Care Physician:  Provider, No Known  Date of admission: 2024    Subjective     Interval History:   Follow-up on JASS and CKD stage IIIa-IIIb    Breathing is comfortable on RA, though having frequent bouts of coughing  No CP or N/V/D; eating well  UOP past 24 hours not fully recorded    Review of Systems:   As noted above    Objective     Vitals:   Temp:  [97.9 °F (36.6 °C)-98.6 °F (37 °C)] 97.9 °F (36.6 °C)  Heart Rate:  [61-69] 65  Resp:  [18-24] 18  BP: (125-147)/(62-73) 147/62    Intake/Output Summary (Last 24 hours) at 2024 1652  Last data filed at 2024 2328  Gross per 24 hour   Intake --   Output 500 ml   Net -500 ml       Physical Exam:    General: Awake but blunted, minimally verbal, chr ill; obese; NAD   Skin: warm and dry, erythema to left ankle  HEENT: oral mucosa normal, nonicteric sclera  Neck: supple, no JVD  Lungs: Bibasilar crackles with exp wheezing, nonlabored   Heart: RRR, + 2/6 murmur, no rub  Abdomen: soft, nontender, distended, BS +  : no palpable bladder  Extremities: +2-3 edema on left, 2+ edema on right, left foot appears to have a Charcot deformity   Neuro: Moves all extremities, follows commands    Scheduled Meds:     aspirin, 81 mg, Oral, Daily  atorvastatin, 10 mg, Oral, Daily  carbidopa-levodopa CR, 2 tablet, Oral, Daily  cefTRIAXone, 2,000 mg, Intravenous, Q24H  donepezil, 10 mg, Oral, Daily  fluticasone, 2 spray, Nasal, Daily  furosemide, 40 mg, Intravenous, Once  guaiFENesin, 600 mg, Oral, BID  ipratropium-albuterol, 3 mL, Nebulization, 4x Daily - RT  memantine, 10 mg, Oral, Daily  modafinil, 400 mg, Oral, Daily  predniSONE, 12.5 mg, Oral, Daily  sodium chloride, 10 mL, Intravenous, Q12H  tamsulosin, 0.4 mg, Oral, BID  [START ON 2024] vancomycin, 1,000 mg, Intravenous, Q24H      IV Meds:   Pharmacy to dose vancomycin,         Results  Reviewed:   I have personally reviewed the results from the time of this admission to 9/6/2024 16:52 EDT     Results from last 7 days   Lab Units 09/06/24 0557 09/05/24 0427 09/04/24 0545 09/03/24 0333 09/02/24  2221   SODIUM mmol/L 140 141 138   < > 130*   POTASSIUM mmol/L 3.6 4.0 3.8   < > 5.4*   CHLORIDE mmol/L 107 106 104   < > 96*   CO2 mmol/L 22.0 21.0* 18.2*   < > 19.0*   BUN mg/dL 30* 34* 37*   < > 38*   CREATININE mg/dL 1.65* 2.04* 2.22*   < > 3.13*   CALCIUM mg/dL 8.2* 8.3* 8.0*   < > 8.3*   BILIRUBIN mg/dL  --   --   --   --  0.5   ALK PHOS U/L  --   --   --   --  77   ALT (SGPT) U/L  --   --   --   --  <5   AST (SGOT) U/L  --   --   --   --  29   GLUCOSE mg/dL 104* 92 113*   < > 141*    < > = values in this interval not displayed.       Estimated Creatinine Clearance: 44.4 mL/min (A) (by C-G formula based on SCr of 1.65 mg/dL (H)).    Results from last 7 days   Lab Units 09/06/24 0557 09/05/24 0427 09/02/24  2221   MAGNESIUM mg/dL 2.0 2.1 1.8   PHOSPHORUS mg/dL 2.6 3.2  --        Results from last 7 days   Lab Units 09/06/24 0557   URIC ACID mg/dL 8.2*       Results from last 7 days   Lab Units 09/06/24 0557 09/05/24 0427 09/04/24 0545 09/03/24 0333 09/02/24  2139   WBC 10*3/mm3 8.00 13.94* 17.19* 23.05* 25.78*   HEMOGLOBIN g/dL 8.9* 9.0* 9.2* 9.8* 10.9*   PLATELETS 10*3/mm3 177 183 188 183 190       Results from last 7 days   Lab Units 09/02/24  2139   INR  1.39*       Assessment / Plan     ASSESSMENT:  JASS on UDS2v-t, with UOP not quantified, improving, likely prerenal from hypotension and suspected sepsis syndrome.  Volume overloaded by exam with crackles and edema.  Low-normal potassium; normal anion gap.  Urine with no blood, 100 mg/dL protein, few white blood cells, and squamous epithelial cells.   Solitary kidney s/p left nephrectomy due to malignancy  Lethargy and hypotension, BP improving  Alzheimer's dementia and Parkinson's disease   PMR on prednisone; family believes he was  compliant with medications prior to admission   Chronic left foot wound for the past 6 months with ongoing bloody drainage; may have superimposed cellulitis left leg.  No evidence for osteomyelitis by MRI  Interstitial lung disease  Anemia, normocytic, stable     PLAN:  IV furosemide 60 mg for one dose today  Discussed with wife at bedside and with her daughter via phone  Given extra dose of potassium in preparation for IV diuretic  Surveillance labs    Thank you for involving us in the care of Allan Kruger.  Please feel free to call with any questions.    Que Ruvalcaba MD  09/06/24  16:52 EDT    [facility]  [phone]    Please note that portions of this note were completed with a voice recognition program.

## 2024-09-07 ENCOUNTER — APPOINTMENT (OUTPATIENT)
Dept: CARDIOLOGY | Facility: HOSPITAL | Age: 84
End: 2024-09-07
Payer: MEDICARE

## 2024-09-07 LAB
ALBUMIN SERPL-MCNC: 3.1 G/DL (ref 3.5–5.2)
ALBUMIN/GLOB SERPL: 0.9 G/DL
ALP SERPL-CCNC: 73 U/L (ref 39–117)
ALT SERPL W P-5'-P-CCNC: 13 U/L (ref 1–41)
ANION GAP SERPL CALCULATED.3IONS-SCNC: 12.7 MMOL/L (ref 5–15)
AORTIC DIMENSIONLESS INDEX: 0.4 (DI)
ASCENDING AORTA: 3.8 CM
AST SERPL-CCNC: 16 U/L (ref 1–40)
BACTERIA SPEC AEROBE CULT: NORMAL
BACTERIA SPEC AEROBE CULT: NORMAL
BH CV ECHO AV AORTIC VALVE AT ACCEL TIME CALCULATED: 100 MSEC
BH CV ECHO MEAS - ACS: 1.37 CM
BH CV ECHO MEAS - AO MAX PG: 57.8 MMHG
BH CV ECHO MEAS - AO MEAN PG: 30.4 MMHG
BH CV ECHO MEAS - AO ROOT DIAM: 4.2 CM
BH CV ECHO MEAS - AO V2 MAX: 380.2 CM/SEC
BH CV ECHO MEAS - AO V2 VTI: 84 CM
BH CV ECHO MEAS - AT: 0.1 SEC
BH CV ECHO MEAS - AVA(I,D): 1.45 CM2
BH CV ECHO MEAS - EDV(CUBED): 101.7 ML
BH CV ECHO MEAS - EDV(MOD-SP2): 96 ML
BH CV ECHO MEAS - EDV(MOD-SP4): 99 ML
BH CV ECHO MEAS - EF(MOD-BP): 60.6 %
BH CV ECHO MEAS - EF(MOD-SP2): 61.5 %
BH CV ECHO MEAS - EF(MOD-SP4): 56.6 %
BH CV ECHO MEAS - ESV(CUBED): 24.5 ML
BH CV ECHO MEAS - ESV(MOD-SP2): 37 ML
BH CV ECHO MEAS - ESV(MOD-SP4): 43 ML
BH CV ECHO MEAS - FS: 37.8 %
BH CV ECHO MEAS - IVS/LVPW: 0.86 CM
BH CV ECHO MEAS - IVSD: 1.19 CM
BH CV ECHO MEAS - LAT PEAK E' VEL: 7.2 CM/SEC
BH CV ECHO MEAS - LV MASS(C)D: 231.2 GRAMS
BH CV ECHO MEAS - LV MAX PG: 8 MMHG
BH CV ECHO MEAS - LV MEAN PG: 4.6 MMHG
BH CV ECHO MEAS - LV V1 MAX: 141.4 CM/SEC
BH CV ECHO MEAS - LV V1 VTI: 30.8 CM
BH CV ECHO MEAS - LVIDD: 4.7 CM
BH CV ECHO MEAS - LVIDS: 2.9 CM
BH CV ECHO MEAS - LVOT AREA: 3.9 CM2
BH CV ECHO MEAS - LVOT DIAM: 2.24 CM
BH CV ECHO MEAS - LVPWD: 1.38 CM
BH CV ECHO MEAS - MED PEAK E' VEL: 6.6 CM/SEC
BH CV ECHO MEAS - MV A DUR: 0.14 SEC
BH CV ECHO MEAS - MV A MAX VEL: 108.8 CM/SEC
BH CV ECHO MEAS - MV DEC SLOPE: 377.2 CM/SEC2
BH CV ECHO MEAS - MV DEC TIME: 0.3 SEC
BH CV ECHO MEAS - MV E MAX VEL: 101 CM/SEC
BH CV ECHO MEAS - MV E/A: 0.93
BH CV ECHO MEAS - MV MAX PG: 5.3 MMHG
BH CV ECHO MEAS - MV MEAN PG: 2.5 MMHG
BH CV ECHO MEAS - MV P1/2T: 83.3 MSEC
BH CV ECHO MEAS - MV V2 VTI: 31.7 CM
BH CV ECHO MEAS - MVA(P1/2T): 2.6 CM2
BH CV ECHO MEAS - MVA(VTI): 3.8 CM2
BH CV ECHO MEAS - PA ACC TIME: 0.15 SEC
BH CV ECHO MEAS - PA V2 MAX: 114 CM/SEC
BH CV ECHO MEAS - RV MAX PG: 2.38 MMHG
BH CV ECHO MEAS - RV V1 MAX: 77.1 CM/SEC
BH CV ECHO MEAS - RV V1 VTI: 14.6 CM
BH CV ECHO MEAS - SV(LVOT): 121.4 ML
BH CV ECHO MEAS - SV(MOD-SP2): 59 ML
BH CV ECHO MEAS - SV(MOD-SP4): 56 ML
BH CV ECHO MEAS - TAPSE (>1.6): 2.48 CM
BH CV ECHO MEASUREMENTS AVERAGE E/E' RATIO: 14.64
BH CV XLRA - TDI S': 12.8 CM/SEC
BILIRUB SERPL-MCNC: 0.3 MG/DL (ref 0–1.2)
BUN SERPL-MCNC: 27 MG/DL (ref 8–23)
BUN/CREAT SERPL: 18 (ref 7–25)
CALCIUM SPEC-SCNC: 8.4 MG/DL (ref 8.6–10.5)
CANCER AG19-9 SERPL-ACNC: 16.6 U/ML
CHLORIDE SERPL-SCNC: 110 MMOL/L (ref 98–107)
CO2 SERPL-SCNC: 21.3 MMOL/L (ref 22–29)
CREAT SERPL-MCNC: 1.5 MG/DL (ref 0.76–1.27)
DEPRECATED RDW RBC AUTO: 49.9 FL (ref 37–54)
EGFRCR SERPLBLD CKD-EPI 2021: 45.1 ML/MIN/1.73
ERYTHROCYTE [DISTWIDTH] IN BLOOD BY AUTOMATED COUNT: 15.2 % (ref 12.3–15.4)
GLOBULIN UR ELPH-MCNC: 3.5 GM/DL
GLUCOSE SERPL-MCNC: 99 MG/DL (ref 65–99)
HCT VFR BLD AUTO: 28.8 % (ref 37.5–51)
HGB BLD-MCNC: 9.2 G/DL (ref 13–17.7)
MCH RBC QN AUTO: 28.9 PG (ref 26.6–33)
MCHC RBC AUTO-ENTMCNC: 31.9 G/DL (ref 31.5–35.7)
MCV RBC AUTO: 90.6 FL (ref 79–97)
PLATELET # BLD AUTO: 231 10*3/MM3 (ref 140–450)
PMV BLD AUTO: 9.6 FL (ref 6–12)
POTASSIUM SERPL-SCNC: 3.8 MMOL/L (ref 3.5–5.2)
PROCALCITONIN SERPL-MCNC: 5.09 NG/ML (ref 0–0.25)
PROT SERPL-MCNC: 6.6 G/DL (ref 6–8.5)
RBC # BLD AUTO: 3.18 10*6/MM3 (ref 4.14–5.8)
SINUS: 3.5 CM
SODIUM SERPL-SCNC: 144 MMOL/L (ref 136–145)
STJ: 2.9 CM
WBC NRBC COR # BLD AUTO: 7.59 10*3/MM3 (ref 3.4–10.8)

## 2024-09-07 PROCEDURE — 84145 PROCALCITONIN (PCT): CPT | Performed by: STUDENT IN AN ORGANIZED HEALTH CARE EDUCATION/TRAINING PROGRAM

## 2024-09-07 PROCEDURE — 25010000002 HEPARIN (PORCINE) PER 1000 UNITS: Performed by: STUDENT IN AN ORGANIZED HEALTH CARE EDUCATION/TRAINING PROGRAM

## 2024-09-07 PROCEDURE — 80053 COMPREHEN METABOLIC PANEL: CPT | Performed by: STUDENT IN AN ORGANIZED HEALTH CARE EDUCATION/TRAINING PROGRAM

## 2024-09-07 PROCEDURE — 94761 N-INVAS EAR/PLS OXIMETRY MLT: CPT

## 2024-09-07 PROCEDURE — 94799 UNLISTED PULMONARY SVC/PX: CPT

## 2024-09-07 PROCEDURE — 85027 COMPLETE CBC AUTOMATED: CPT | Performed by: HOSPITALIST

## 2024-09-07 PROCEDURE — 25510000001 PERFLUTREN 6.52 MG/ML SUSPENSION 2 ML VIAL: Performed by: HOSPITALIST

## 2024-09-07 PROCEDURE — 86301 IMMUNOASSAY TUMOR CA 19-9: CPT | Performed by: STUDENT IN AN ORGANIZED HEALTH CARE EDUCATION/TRAINING PROGRAM

## 2024-09-07 PROCEDURE — 25010000002 VANCOMYCIN HCL 1.25 G RECONSTITUTED SOLUTION 1 EACH VIAL: Performed by: HOSPITALIST

## 2024-09-07 PROCEDURE — 25810000003 SODIUM CHLORIDE 0.9 % SOLUTION 250 ML FLEX CONT: Performed by: HOSPITALIST

## 2024-09-07 PROCEDURE — 63710000001 PREDNISONE PER 5 MG: Performed by: HOSPITALIST

## 2024-09-07 PROCEDURE — 93306 TTE W/DOPPLER COMPLETE: CPT

## 2024-09-07 PROCEDURE — 93306 TTE W/DOPPLER COMPLETE: CPT | Performed by: INTERNAL MEDICINE

## 2024-09-07 PROCEDURE — 25010000002 FUROSEMIDE PER 20 MG: Performed by: INTERNAL MEDICINE

## 2024-09-07 RX ORDER — HEPARIN SODIUM 5000 [USP'U]/ML
5000 INJECTION, SOLUTION INTRAVENOUS; SUBCUTANEOUS EVERY 8 HOURS SCHEDULED
Status: DISCONTINUED | OUTPATIENT
Start: 2024-09-07 | End: 2024-09-10 | Stop reason: HOSPADM

## 2024-09-07 RX ORDER — FUROSEMIDE 10 MG/ML
40 INJECTION INTRAMUSCULAR; INTRAVENOUS ONCE
Status: COMPLETED | OUTPATIENT
Start: 2024-09-07 | End: 2024-09-07

## 2024-09-07 RX ORDER — TORSEMIDE 20 MG/1
20 TABLET ORAL DAILY
Status: DISCONTINUED | OUTPATIENT
Start: 2024-09-08 | End: 2024-09-10 | Stop reason: HOSPADM

## 2024-09-07 RX ADMIN — GUAIFENESIN 600 MG: 600 TABLET, EXTENDED RELEASE ORAL at 21:50

## 2024-09-07 RX ADMIN — IPRATROPIUM BROMIDE AND ALBUTEROL SULFATE 3 ML: .5; 3 SOLUTION RESPIRATORY (INHALATION) at 10:48

## 2024-09-07 RX ADMIN — DONEPEZIL HYDROCHLORIDE 10 MG: 10 TABLET, FILM COATED ORAL at 09:15

## 2024-09-07 RX ADMIN — HEPARIN SODIUM 5000 UNITS: 5000 INJECTION INTRAVENOUS; SUBCUTANEOUS at 21:49

## 2024-09-07 RX ADMIN — HEPARIN SODIUM 5000 UNITS: 5000 INJECTION INTRAVENOUS; SUBCUTANEOUS at 16:04

## 2024-09-07 RX ADMIN — IPRATROPIUM BROMIDE AND ALBUTEROL SULFATE 3 ML: .5; 3 SOLUTION RESPIRATORY (INHALATION) at 19:55

## 2024-09-07 RX ADMIN — IPRATROPIUM BROMIDE AND ALBUTEROL SULFATE 3 ML: .5; 3 SOLUTION RESPIRATORY (INHALATION) at 06:33

## 2024-09-07 RX ADMIN — MEMANTINE HYDROCHLORIDE 10 MG: 10 TABLET, FILM COATED ORAL at 09:15

## 2024-09-07 RX ADMIN — ATORVASTATIN CALCIUM 10 MG: 20 TABLET, FILM COATED ORAL at 09:14

## 2024-09-07 RX ADMIN — ASPIRIN 81 MG: 81 TABLET, COATED ORAL at 09:15

## 2024-09-07 RX ADMIN — GUAIFENESIN 600 MG: 600 TABLET, EXTENDED RELEASE ORAL at 09:14

## 2024-09-07 RX ADMIN — TAMSULOSIN HYDROCHLORIDE 0.4 MG: 0.4 CAPSULE ORAL at 21:49

## 2024-09-07 RX ADMIN — TAMSULOSIN HYDROCHLORIDE 0.4 MG: 0.4 CAPSULE ORAL at 09:15

## 2024-09-07 RX ADMIN — Medication 10 ML: at 21:50

## 2024-09-07 RX ADMIN — FUROSEMIDE 40 MG: 10 INJECTION, SOLUTION INTRAMUSCULAR; INTRAVENOUS at 21:49

## 2024-09-07 RX ADMIN — CARBIDOPA AND LEVODOPA 2 TABLET: 50; 200 TABLET, EXTENDED RELEASE ORAL at 09:14

## 2024-09-07 RX ADMIN — PREDNISONE 12.5 MG: 10 TABLET ORAL at 09:15

## 2024-09-07 RX ADMIN — BENZONATATE 200 MG: 100 CAPSULE ORAL at 12:12

## 2024-09-07 RX ADMIN — PERFLUTREN 2 ML: 6.52 INJECTION, SUSPENSION INTRAVENOUS at 14:34

## 2024-09-07 RX ADMIN — VANCOMYCIN HYDROCHLORIDE 1250 MG: 1.25 INJECTION, POWDER, LYOPHILIZED, FOR SOLUTION INTRAVENOUS at 12:08

## 2024-09-07 RX ADMIN — MODAFINIL 400 MG: 100 TABLET ORAL at 09:15

## 2024-09-07 NOTE — PROGRESS NOTES
[facility] Progress Note      Patient Name: Allan Kruger  : 1940  MRN: [id number]  Primary Care Physician:  Provider, No Known  Date of admission: 2024    Subjective     Interval History:   Follow-up on JASS and CKD stage IIIa-IIIb    Breathing is comfortable on RA  UOP 2 L yesterday after IV furosemide 60 mg  No CP or N/V/D; eating well    Review of Systems:   As noted above    Objective     Vitals:   Temp:  [97.7 °F (36.5 °C)-98.5 °F (36.9 °C)] 98.2 °F (36.8 °C)  Heart Rate:  [58-71] 71  Resp:  [18-20] 18  BP: (127-158)/(55-76) 138/76    Intake/Output Summary (Last 24 hours) at 2024  Last data filed at 2024 0344  Gross per 24 hour   Intake 210 ml   Output 2000 ml   Net -1790 ml       Physical Exam:    General: Awake but blunted, minimally verbal, chr ill; obese; NAD   Skin: warm and dry, erythema to left ankle  HEENT: oral mucosa normal, nonicteric sclera  Neck: supple, no JVD  Lungs: Bibasilar crackles with exp wheezing, nonlabored   Heart: RRR, + 2/6 murmur, no rub  Abdomen: soft, nontender, distended, BS +  : no palpable bladder  Extremities: +2-3 edema on left, 2+ edema on right, left foot appears to have a Charcot deformity; left foot wound dressed  Neuro: Moves all extremities, follows commands    Scheduled Meds:     aspirin, 81 mg, Oral, Daily  atorvastatin, 10 mg, Oral, Daily  carbidopa-levodopa CR, 2 tablet, Oral, Daily  cefTRIAXone, 2,000 mg, Intravenous, Q24H  donepezil, 10 mg, Oral, Daily  fluticasone, 2 spray, Nasal, Daily  guaiFENesin, 600 mg, Oral, BID  heparin (porcine), 5,000 Units, Subcutaneous, Q8H  ipratropium-albuterol, 3 mL, Nebulization, 4x Daily - RT  memantine, 10 mg, Oral, Daily  predniSONE, 12.5 mg, Oral, Daily  sodium chloride, 10 mL, Intravenous, Q12H  tamsulosin, 0.4 mg, Oral, BID  vancomycin, 1,250 mg, Intravenous, Q24H      IV Meds:   Pharmacy to dose vancomycin,         Results Reviewed:   I have personally reviewed the  results from the time of this admission to 9/7/2024 19:47 EDT     Results from last 7 days   Lab Units 09/07/24  0429 09/06/24  0557 09/05/24  0427 09/03/24  0333 09/02/24  2221   SODIUM mmol/L 144 140 141   < > 130*   POTASSIUM mmol/L 3.8 3.6 4.0   < > 5.4*   CHLORIDE mmol/L 110* 107 106   < > 96*   CO2 mmol/L 21.3* 22.0 21.0*   < > 19.0*   BUN mg/dL 27* 30* 34*   < > 38*   CREATININE mg/dL 1.50* 1.65* 2.04*   < > 3.13*   CALCIUM mg/dL 8.4* 8.2* 8.3*   < > 8.3*   BILIRUBIN mg/dL 0.3  --   --   --  0.5   ALK PHOS U/L 73  --   --   --  77   ALT (SGPT) U/L 13  --   --   --  <5   AST (SGOT) U/L 16  --   --   --  29   GLUCOSE mg/dL 99 104* 92   < > 141*    < > = values in this interval not displayed.       Estimated Creatinine Clearance: 48.6 mL/min (A) (by C-G formula based on SCr of 1.5 mg/dL (H)).    Results from last 7 days   Lab Units 09/06/24 0557 09/05/24 0427 09/02/24  2221   MAGNESIUM mg/dL 2.0 2.1 1.8   PHOSPHORUS mg/dL 2.6 3.2  --        Results from last 7 days   Lab Units 09/06/24 0557   URIC ACID mg/dL 8.2*       Results from last 7 days   Lab Units 09/07/24 0429 09/06/24  0557 09/05/24  0427 09/04/24  0545 09/03/24  0333   WBC 10*3/mm3 7.59 8.00 13.94* 17.19* 23.05*   HEMOGLOBIN g/dL 9.2* 8.9* 9.0* 9.2* 9.8*   PLATELETS 10*3/mm3 231 177 183 188 183       Results from last 7 days   Lab Units 09/02/24  2139   INR  1.39*       Assessment / Plan     ASSESSMENT:  JASS on [id number], nonoliguric, improving, likely prerenal from hypotension and suspected sepsis syndrome.  Volume overloaded by exam with crackles and edema, responding to diuretic.  Potassium and anion gap are normal.  Urine with no blood, 100 mg/dL protein, few white blood cells, and squamous epithelial cells.   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  No evidence for osteomyelitis by MRI  Interstitial lung disease  Anemia, normocytic, stable     PLAN:  IV furosemide 40 mg today, and start tomorrow torsemide 20 mg daily  Discussed with wife at bedside  Surveillance labs    Thank you for involving us in the care of Allan Kruger.  Please feel free to call with any questions.    Que Ruvalcaba MD  09/07/24  19:47 EDT    [facility]  [phone]    Please note that portions of this note were completed with a voice recognition program.

## 2024-09-07 NOTE — PLAN OF CARE
Goal Outcome Evaluation:         Patient given nursing  care per MD orders and hospital policies and showed no signs or symptoms of distress this shift. Appears to be breathing better today and coughing less. Dressing changed this afternoon per order

## 2024-09-07 NOTE — PROGRESS NOTES
Name: Allan Kruger ADMIT: 2024   : 1940  PCP: Provider, No Known    MRN: [id number] LOS: 5 days   AGE/SEX: 84 y.o. male  ROOM: [facility]     Subjective   Subjective     Cough is improved.  CT chest yesterday showed patchy groundglass infiltrates in the lung apices and upper lobes thought to be infectious/inflammatory or edema.  proBNP was elevated.    Ductal dilation or cystic lesion involving the pancreatic tail was also seen  He was given a dose of IV Lasix      Intake/Output Summary (Last 24 hours) at 2024 1145  Last data filed at 2024 0344  Gross per 24 hour   Intake 210 ml   Output 2000 ml   Net -1790 ml            Objective   Objective   Vital Signs  Temp:  [97.7 °F (36.5 °C)-98.5 °F (36.9 °C)] 98.5 °F (36.9 °C)  Heart Rate:  [58-68] 59  Resp:  [18-20] 20  BP: (127-158)/(55-70) 158/70  SpO2:  [88 %-99 %] 95 %  on   ;   Device (Oxygen Therapy): room air  Body mass index is 31.28 kg/m².  Physical Exam  Vitals reviewed.   Constitutional:       General: He is not in acute distress.     Appearance: He is obese.   Cardiovascular:      Rate and Rhythm: Normal rate.   Pulmonary:      Effort: Pulmonary effort is normal. No respiratory distress.   Abdominal:      General: There is no distension.      Palpations: Abdomen is soft.   Musculoskeletal:      Right lower leg: Edema present.      Left lower leg: Edema present.   Skin:     General: Skin is warm and dry.      Comments: LLE remains pink and warm   Neurological:      General: No focal deficit present.      Mental Status: He is alert.   Psychiatric:      Comments: Flat affect.  Does not say much       Results Review     I reviewed the patient's new clinical results.  Results from last 7 days   Lab Units 24  04224  0557 24  04224  0545   WBC 10*3/mm3 7.59 8.00 13.94* 17.19*   HEMOGLOBIN g/dL 9.2* 8.9* 9.0* 9.2*   PLATELETS 10*3/mm3 231 177 183 188     Results from last 7 days   Lab Units 24  0429 24  0557  "09/05/24 0427 09/04/24  0545   SODIUM mmol/L 144 140 141 138   POTASSIUM mmol/L 3.8 3.6 4.0 3.8   CHLORIDE mmol/L 110* 107 106 104   CO2 mmol/L 21.3* 22.0 21.0* 18.2*   BUN mg/dL 27* 30* 34* 37*   CREATININE mg/dL 1.50* 1.65* 2.04* 2.22*   GLUCOSE mg/dL 99 104* 92 113*   EGFR mL/min/1.73 45.1* 40.2* 31.2* 28.1*     Results from last 7 days   Lab Units 09/07/24 0429 09/06/24  0557 09/05/24 0427 09/02/24  2221   ALBUMIN g/dL 3.1* 3.1* 3.2* 3.3*   BILIRUBIN mg/dL 0.3  --   --  0.5   ALK PHOS U/L 73  --   --  77   AST (SGOT) U/L 16  --   --  29   ALT (SGPT) U/L 13  --   --  <5     Results from last 7 days   Lab Units 09/07/24 0429 09/06/24  0557 09/05/24  0427 09/04/24  0545 09/03/24  0333 09/02/24  2221   CALCIUM mg/dL 8.4* 8.2* 8.3* 8.0*   < > 8.3*   ALBUMIN g/dL 3.1* 3.1* 3.2*  --   --  3.3*   MAGNESIUM mg/dL  --  2.0 2.1  --   --  1.8   PHOSPHORUS mg/dL  --  2.6 3.2  --   --   --     < > = values in this interval not displayed.     Results from last 7 days   Lab Units 09/07/24 0429 09/04/24  0545 09/03/24  2020 09/03/24  1354 09/03/24  1002 09/02/24  2325 09/02/24  2221   PROCALCITONIN ng/mL 5.09*  --   --   --   --   --  48.30*   LACTATE mmol/L  --  1.5 2.5* 2.2* 2.8*   < >  --     < > = values in this interval not displayed.     No results found for: \"HGBA1C\", \"POCGLU\"    CT Chest Without Contrast Diagnostic    Result Date: 9/6/2024  1. Patchy groundglass infiltrates in the lung apices and upper lobe are favored to be infectious/inflammatory or edema 2. Peripheral and basilar predominant interstitial septal thickening with groundglass opacity associated with areas of bronchiectasis. These findings are indeterminate. Areas of chronic fibrotic change are suggested given the bronchiectasis however acute superimposed infectious/inflammatory process or edema may also be present. Follow-up recommended to evaluate stability/clearing 3. Possible ductal dilation or cystic lesion involving the pancreatic tail " associated with some calcifications, indeterminate without contrast. Suggest evaluation with MRI abdomen or CT abdomen as described above   Radiation dose reduction techniques were utilized, including automated exposure control and exposure modulation based on body size.   This report was finalized on 9/6/2024 3:14 PM by Dr. Juan A Jay M.D on Workstation: [id number]      MRI Foot Left Without Contrast    Result Date: 9/6/2024   1. Focal wound at the medial and plantar junction of the midfoot and forefoot extending through the subcutaneous fat and with focal extension through the medial plantar fascia to the superficial aspect of the right thigh musculature. Diffuse soft tissue edema with no well-formed or drainable fluid collection to suggest abscess. 2. No evidence of osteomyelitis. 3. 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 4. Moderate to severe chondromalacia/DJD at the great toe MTP joint. 5. Severe diffuse muscular fatty atrophy, likely neuropathic, with mild diffuse muscular edema that could also be neuropathic or could represent myositis.     This report was finalized on 9/6/2024 7:47 AM by Silvestre Mejia MD on Workstation: [id number]       I have personally reviewed all medications:  Scheduled Medications  aspirin, 81 mg, Oral, Daily  atorvastatin, 10 mg, Oral, Daily  carbidopa-levodopa CR, 2 tablet, Oral, Daily  cefTRIAXone, 2,000 mg, Intravenous, Q24H  donepezil, 10 mg, Oral, Daily  fluticasone, 2 spray, Nasal, Daily  guaiFENesin, 600 mg, Oral, BID  ipratropium-albuterol, 3 mL, Nebulization, 4x Daily - RT  memantine, 10 mg, Oral, Daily  predniSONE, 12.5 mg, Oral, Daily  sodium chloride, 10 mL, Intravenous, Q12H  tamsulosin, 0.4 mg, Oral, BID  vancomycin, 1,250 mg, Intravenous, Q24H    Infusions  Pharmacy to dose vancomycin,     Diet  Diet: Cardiac; Healthy Heart (2-3 Na+); Fluid Consistency: Thin (IDDSI 0)         Assessment/Plan     Active  Hospital Problems    Diagnosis  POA    **Sepsis [A41.9]  Yes    PMR (polymyalgia rheumatica) [M35.3]  Yes    Current chronic use of systemic steroids [Z79.52]  Not Applicable    Diarrhea [R19.7]  Yes    JASS (acute kidney injury) [N17.9]  Yes    Stage 3b chronic kidney disease [N18.32]  Yes    Dementia without behavioral disturbance [F03.90]  Yes    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       84 y.o. male with history of PMR on chronic prednisone, CKD 3B, seronegative RA, Parkinson's, UIP and Alzheimer's dementia presenting with acutely altered mental status and hypotension.    Sepsis: Suspected to be due to LLE cellulitis.  Blood cultures negative so far and MRSA swab negative.  -  nonhealing wound on his left heel noted. Mri obtained which shows a local wound at the medial and plantar junction of the midfoot and forefoot extending through the subcutaneous fat and with focal extension.  No evidence of osteomyelitis.    - Check Dopplers- some superficial thrombophlebitis noted however no DVTs. Already on aspirin    Pancreatic lesion  Obtain CA 19-9. If elevated will pursue additional imaging    Interstitial lung disease  On prednisone 12.5mg. CT chest ordered. Wheezing on exam. Suspect exacerbation of ILD vs pulmoanry edmea    Pulmonary edema  S/p IV lasix with brisk diuresis.     JASS/CKD 3B, improving. Nephrology following    PMR: On home dose prednisone    Diarrhea: C. difficile toxin negative.     Heparin SC for dvt ppx   Full code  Dispo: plan to dc on Monday, 9/9      Jimmie Molina MD  [facility]  09/07/24  11:44 EDT

## 2024-09-07 NOTE — PLAN OF CARE
Goal Outcome Evaluation:      Pt arousal to voice, oriented to self, RA while awake, VSS. No acute changes during this shift. Family currently at bedside. Pt sleeps with CPAP from home. Purewick in place. IV Rocephin administered. Bed alarm on. Call light within reach. Plan of care ongoing.

## 2024-09-07 NOTE — PROGRESS NOTES
"[facility] Clinical Pharmacy Services: Vancomycin Monitoring Note    Allan Kruger is a 84 y.o. male who is on day 4 of pharmacy to dose vancomycin for Skin and Soft Tissue infection    Current Vancomycin Dose:   1000mg IV q24h  Updated Cultures and Sensitivities:  blood cultures ngtd  9/3 MRSA nares negative  Results from last 7 days   Lab Units 09/06/24  0557 09/05/24  0427 09/04/24  0545   VANCOMYCIN RM mcg/mL 16.40 15.00 13.20     Vitals/Labs  Ht: 193 cm (76\"); Wt: 116 kg (256 lb 13.4 oz)   Temp Readings from Last 1 Encounters:   09/07/24 98.5 °F (36.9 °C) (Oral)     Estimated Creatinine Clearance: 49.5 mL/min (A) (by C-G formula based on SCr of 1.5 mg/dL (H)).     Results from last 7 days   Lab Units 09/07/24  0429 09/06/24  0557 09/05/24  0427   CREATININE mg/dL 1.50* 1.65* 2.04*   WBC 10*3/mm3 7.59 8.00 13.94*     Assessment/Plan  Creatinine slightly improved today.   Vancomycin dose: increase dose slightly to 1250mg iv q24h. Appears dose was missed yesterday so will start this now. predicted  AUC= 488 (goal 400-600)  Next Level Date and Time: no further levels needed unless vancomycin duration of therapy is extended.  We will continue to monitor patient changes and renal function     Thank you for involving pharmacy in this patient's care. Please contact pharmacy with any questions or concerns.       Risa Funes, PharmD  9/7/2024                  "

## 2024-09-07 NOTE — PROGRESS NOTES
"Dr. MUSA Lin    [address]        Patient ID:  Name:  Allan Kruger  MRN:  [id number]  1940  84 y.o.  male            CC/Reason for visit: Interstitial lung disease, polymyalgia rheumatica, rheumatoid arthritis, Parkinson's/Alzheimer    Interval hx: Patient is not very communicative.  He stares and nods occasionally but does not answer any of my questions.  His wife is at bedside who provides all the history.  I reviewed notes by Dr. Silva and other medical consultants    ROS: Difficult to obtain, patient not very communicative    I reviewed old medical records.  Past medical history, social history and family history: Unchanged from admission H&P.      Vitals:  Vitals:    09/07/24 0708 09/07/24 1048 09/07/24 1248 09/07/24 1433   BP: 158/70  138/76 138/76   BP Location: Right arm  Right arm    Patient Position: Lying  Lying    Pulse: 59  63 71   Resp: 18 20 18    Temp: 98.5 °F (36.9 °C)  98.2 °F (36.8 °C)    TempSrc: Oral  Oral    SpO2: 95%  98%    Weight:    116 kg (256 lb)   Height:    190.5 cm (75\")           Body mass index is 32 kg/m².    Intake/Output Summary (Last 24 hours) at 9/7/2024 1500  Last data filed at 9/7/2024 0344  Gross per 24 hour   Intake 210 ml   Output 2000 ml   Net -1790 ml       Exam:  GEN:  No distress  Alert, oriented x 2, poor memory and insight.   LUNGS: Distant breath sounds  bilat, no use of accessory muscles  CV:  Normal S1S2, without murmur, there is bilateral leg edema  ABD:  Non tender, obesity hampers exam      Scheduled meds:  aspirin, 81 mg, Oral, Daily  atorvastatin, 10 mg, Oral, Daily  carbidopa-levodopa CR, 2 tablet, Oral, Daily  cefTRIAXone, 2,000 mg, Intravenous, Q24H  donepezil, 10 mg, Oral, Daily  fluticasone, 2 spray, Nasal, Daily  guaiFENesin, 600 mg, Oral, BID  heparin (porcine), 5,000 Units, Subcutaneous, Q8H  ipratropium-albuterol, 3 mL, Nebulization, 4x Daily - RT  memantine, 10 mg, Oral, Daily  predniSONE, 12.5 mg, Oral, Daily  sodium " chloride, 10 mL, Intravenous, Q12H  tamsulosin, 0.4 mg, Oral, BID  vancomycin, 1,250 mg, Intravenous, Q24H      IV meds:                      Pharmacy to dose vancomycin,         Data Review:   I reviewed the patient's medications and new clinical results.            Results from last 7 days   Lab Units 09/07/24  0429 09/06/24  0557 09/05/24  0427 09/03/24  0333 09/02/24  2221 09/02/24  2139   SODIUM mmol/L 144 140 141   < > 130*  --    POTASSIUM mmol/L 3.8 3.6 4.0   < > 5.4*  --    CHLORIDE mmol/L 110* 107 106   < > 96*  --    CO2 mmol/L 21.3* 22.0 21.0*   < > 19.0*  --    BUN mg/dL 27* 30* 34*   < > 38*  --    CREATININE mg/dL 1.50* 1.65* 2.04*   < > 3.13*  --    CALCIUM mg/dL 8.4* 8.2* 8.3*   < > 8.3*  --    BILIRUBIN mg/dL 0.3  --   --   --  0.5  --    ALK PHOS U/L 73  --   --   --  77  --    ALT (SGPT) U/L 13  --   --   --  <5  --    AST (SGOT) U/L 16  --   --   --  29  --    GLUCOSE mg/dL 99 104* 92   < > 141*  --    WBC 10*3/mm3 7.59 8.00 13.94*   < >  --  25.78*   HEMOGLOBIN g/dL 9.2* 8.9* 9.0*   < >  --  10.9*   PLATELETS 10*3/mm3 231 177 183   < >  --  190   INR   --   --   --   --   --  1.39*   PROBNP pg/mL  --  7,540.0*  --   --   --   --    PROCALCITONIN ng/mL 5.09*  --   --   --  48.30*  --     < > = values in this interval not displayed.     Results from last 7 days   Lab Units 09/02/24  2325 09/02/24  2245   BLOODCX  No growth at 4 days  No growth at 4 days  --    URINECX   --  No growth              ASSESSMENT:     Sepsis    PMR (polymyalgia rheumatica)    Current chronic use of systemic steroids    Diarrhea    JASS (acute kidney injury)    Stage 3b chronic kidney disease    Dementia without behavioral disturbance    Rheumatoid arthritis with negative rheumatoid factor    Parkinson disease    Pulmonary fibrosis  Interstitial lung disease  Rheumatoid arthritis/polymyalgia rheumatica  Alzheimer Parkinson's      PLAN:  In this elderly patient who is debilitated, has Alzheimer's and Parkinson's I do not  recommend any further workup.  Continue with antibiotic therapy for sepsis.  His interstitial lung disease has been present for many years, more than a decade.  His wife was at bedside and they just moved here from [facility].  There he had been followed by rheumatology and pulmonology.  He is usually on low-dose prednisone for maintenance of his rheumatologic conditions.  He is on room air.  On admission he may have had some superimposed volume overload, pulmonary edema which responded to some diuretics.  Now that his respiratory status has improved, I do not feel he has ILD exacerbation.  Do not recommend to pursue any additional workup.  The patient has poor functional capacity.  He needs to follow-up with [facility] and [facility] pulmonary.  We will sign off      I reviewed the chart and other providers notes and reviewed labs.  Copied text in this note has been reviewed and is accurate as of today      Matt Lin MD  9/7/2024

## 2024-09-08 LAB
ALBUMIN SERPL-MCNC: 3.5 G/DL (ref 3.5–5.2)
ANION GAP SERPL CALCULATED.3IONS-SCNC: 13 MMOL/L (ref 5–15)
BUN SERPL-MCNC: 30 MG/DL (ref 8–23)
BUN/CREAT SERPL: 18.4 (ref 7–25)
CALCIUM SPEC-SCNC: 8.8 MG/DL (ref 8.6–10.5)
CHLORIDE SERPL-SCNC: 102 MMOL/L (ref 98–107)
CO2 SERPL-SCNC: 25 MMOL/L (ref 22–29)
CREAT SERPL-MCNC: 1.63 MG/DL (ref 0.76–1.27)
DEPRECATED RDW RBC AUTO: 48.4 FL (ref 37–54)
EGFRCR SERPLBLD CKD-EPI 2021: 40.8 ML/MIN/1.73
ERYTHROCYTE [DISTWIDTH] IN BLOOD BY AUTOMATED COUNT: 14.9 % (ref 12.3–15.4)
GLUCOSE SERPL-MCNC: 99 MG/DL (ref 65–99)
HCT VFR BLD AUTO: 31.1 % (ref 37.5–51)
HGB BLD-MCNC: 9.9 G/DL (ref 13–17.7)
MAGNESIUM SERPL-MCNC: 2.1 MG/DL (ref 1.6–2.4)
MCH RBC QN AUTO: 28.4 PG (ref 26.6–33)
MCHC RBC AUTO-ENTMCNC: 31.8 G/DL (ref 31.5–35.7)
MCV RBC AUTO: 89.1 FL (ref 79–97)
PHOSPHATE SERPL-MCNC: 3.2 MG/DL (ref 2.5–4.5)
PLATELET # BLD AUTO: 288 10*3/MM3 (ref 140–450)
PMV BLD AUTO: 9.5 FL (ref 6–12)
POTASSIUM SERPL-SCNC: 3.6 MMOL/L (ref 3.5–5.2)
RBC # BLD AUTO: 3.49 10*6/MM3 (ref 4.14–5.8)
SODIUM SERPL-SCNC: 140 MMOL/L (ref 136–145)
WBC NRBC COR # BLD AUTO: 7.71 10*3/MM3 (ref 3.4–10.8)

## 2024-09-08 PROCEDURE — 94760 N-INVAS EAR/PLS OXIMETRY 1: CPT

## 2024-09-08 PROCEDURE — 25010000002 CEFTRIAXONE PER 250 MG: Performed by: HOSPITALIST

## 2024-09-08 PROCEDURE — 25010000002 VANCOMYCIN HCL 1.25 G RECONSTITUTED SOLUTION 1 EACH VIAL: Performed by: HOSPITALIST

## 2024-09-08 PROCEDURE — 85027 COMPLETE CBC AUTOMATED: CPT | Performed by: HOSPITALIST

## 2024-09-08 PROCEDURE — 94799 UNLISTED PULMONARY SVC/PX: CPT

## 2024-09-08 PROCEDURE — 83735 ASSAY OF MAGNESIUM: CPT | Performed by: INTERNAL MEDICINE

## 2024-09-08 PROCEDURE — 25810000003 SODIUM CHLORIDE 0.9 % SOLUTION 250 ML FLEX CONT: Performed by: HOSPITALIST

## 2024-09-08 PROCEDURE — 25010000002 HEPARIN (PORCINE) PER 1000 UNITS: Performed by: STUDENT IN AN ORGANIZED HEALTH CARE EDUCATION/TRAINING PROGRAM

## 2024-09-08 PROCEDURE — 94761 N-INVAS EAR/PLS OXIMETRY MLT: CPT

## 2024-09-08 PROCEDURE — 63710000001 PREDNISONE PER 5 MG: Performed by: HOSPITALIST

## 2024-09-08 PROCEDURE — 94664 DEMO&/EVAL PT USE INHALER: CPT

## 2024-09-08 PROCEDURE — 80069 RENAL FUNCTION PANEL: CPT | Performed by: INTERNAL MEDICINE

## 2024-09-08 RX ORDER — POTASSIUM CHLORIDE 750 MG/1
20 TABLET, FILM COATED, EXTENDED RELEASE ORAL DAILY
Status: DISCONTINUED | OUTPATIENT
Start: 2024-09-08 | End: 2024-09-09

## 2024-09-08 RX ADMIN — DONEPEZIL HYDROCHLORIDE 10 MG: 10 TABLET, FILM COATED ORAL at 08:35

## 2024-09-08 RX ADMIN — HEPARIN SODIUM 5000 UNITS: 5000 INJECTION INTRAVENOUS; SUBCUTANEOUS at 21:18

## 2024-09-08 RX ADMIN — PREDNISONE 12.5 MG: 10 TABLET ORAL at 08:34

## 2024-09-08 RX ADMIN — IPRATROPIUM BROMIDE AND ALBUTEROL SULFATE 3 ML: .5; 3 SOLUTION RESPIRATORY (INHALATION) at 20:23

## 2024-09-08 RX ADMIN — IPRATROPIUM BROMIDE AND ALBUTEROL SULFATE 3 ML: .5; 3 SOLUTION RESPIRATORY (INHALATION) at 07:42

## 2024-09-08 RX ADMIN — IPRATROPIUM BROMIDE AND ALBUTEROL SULFATE 3 ML: .5; 3 SOLUTION RESPIRATORY (INHALATION) at 10:46

## 2024-09-08 RX ADMIN — GUAIFENESIN 600 MG: 600 TABLET, EXTENDED RELEASE ORAL at 21:18

## 2024-09-08 RX ADMIN — TAMSULOSIN HYDROCHLORIDE 0.4 MG: 0.4 CAPSULE ORAL at 08:35

## 2024-09-08 RX ADMIN — TORSEMIDE 20 MG: 20 TABLET ORAL at 08:35

## 2024-09-08 RX ADMIN — Medication 10 ML: at 21:18

## 2024-09-08 RX ADMIN — MEMANTINE HYDROCHLORIDE 10 MG: 10 TABLET, FILM COATED ORAL at 08:35

## 2024-09-08 RX ADMIN — IPRATROPIUM BROMIDE AND ALBUTEROL SULFATE 3 ML: .5; 3 SOLUTION RESPIRATORY (INHALATION) at 14:32

## 2024-09-08 RX ADMIN — HEPARIN SODIUM 5000 UNITS: 5000 INJECTION INTRAVENOUS; SUBCUTANEOUS at 06:47

## 2024-09-08 RX ADMIN — ATORVASTATIN CALCIUM 10 MG: 20 TABLET, FILM COATED ORAL at 08:35

## 2024-09-08 RX ADMIN — VANCOMYCIN HYDROCHLORIDE 1250 MG: 1.25 INJECTION, POWDER, LYOPHILIZED, FOR SOLUTION INTRAVENOUS at 13:18

## 2024-09-08 RX ADMIN — CARBIDOPA AND LEVODOPA 2 TABLET: 50; 200 TABLET, EXTENDED RELEASE ORAL at 08:35

## 2024-09-08 RX ADMIN — TAMSULOSIN HYDROCHLORIDE 0.4 MG: 0.4 CAPSULE ORAL at 21:24

## 2024-09-08 RX ADMIN — CEFTRIAXONE 2000 MG: 2 INJECTION, POWDER, FOR SOLUTION INTRAMUSCULAR; INTRAVENOUS at 00:09

## 2024-09-08 RX ADMIN — HEPARIN SODIUM 5000 UNITS: 5000 INJECTION INTRAVENOUS; SUBCUTANEOUS at 16:32

## 2024-09-08 RX ADMIN — POTASSIUM CHLORIDE 20 MEQ: 750 TABLET, EXTENDED RELEASE ORAL at 21:18

## 2024-09-08 RX ADMIN — GUAIFENESIN 600 MG: 600 TABLET, EXTENDED RELEASE ORAL at 08:35

## 2024-09-08 RX ADMIN — ASPIRIN 81 MG: 81 TABLET, COATED ORAL at 08:35

## 2024-09-08 NOTE — PROGRESS NOTES
Dr. MUSA Lin    [address]        Patient ID:  Name:  Allan Kruger  MRN:  [id number]  1940  84 y.o.  male            CC/Reason for visit: Interstitial lung disease, polymyalgia rheumatica, rheumatoid arthritis, Parkinson's/Alzheimer     Interval hx: Patient is not very interactive.  Does not answer my questions.  He is awake and alert and calm.  Has not been on oxygen for a couple of days    ROS: Does not answer many questions    Vitals:  Vitals:    09/08/24 0742 09/08/24 1046 09/08/24 1310 09/08/24 1432   BP:   128/70    BP Location:   Right arm    Patient Position:   Sitting    Pulse:   78    Resp: 18 18  16   Temp:   99.5 °F (37.5 °C)    TempSrc:       SpO2:   100%    Weight:       Height:               Body mass index is 31.85 kg/m².    Intake/Output Summary (Last 24 hours) at 9/8/2024 1447  Last data filed at 9/8/2024 1310  Gross per 24 hour   Intake --   Output 3400 ml   Net -3400 ml       Exam:  GEN:  No distress  Alert, awake, eyes are open, acknowledges my presence but does not answer my questions.  LUNGS: Very distant and diminished breath sounds bilat, no use of accessory muscles  CV:  Normal S1S2, without murmur, there is redness of both legs, swelling of both legs  ABD:  Non tender, no enlarged liver or masses      Scheduled meds:  aspirin, 81 mg, Oral, Daily  atorvastatin, 10 mg, Oral, Daily  carbidopa-levodopa CR, 2 tablet, Oral, Daily  donepezil, 10 mg, Oral, Daily  fluticasone, 2 spray, Nasal, Daily  guaiFENesin, 600 mg, Oral, BID  heparin (porcine), 5,000 Units, Subcutaneous, Q8H  ipratropium-albuterol, 3 mL, Nebulization, 4x Daily - RT  memantine, 10 mg, Oral, Daily  predniSONE, 12.5 mg, Oral, Daily  sodium chloride, 10 mL, Intravenous, Q12H  tamsulosin, 0.4 mg, Oral, BID  torsemide, 20 mg, Oral, Daily      IV meds:                           Data Review:   I reviewed the patient's medications and new clinical results.    COVID19   Date Value Ref Range Status    09/06/2024 Not Detected Not Detected - Ref. Range Final           Results from last 7 days   Lab Units 09/08/24  0433 09/07/24  0429 09/06/24  0557 09/03/24  0333 09/02/24  2221 09/02/24  2139   SODIUM mmol/L 140 144 140   < > 130*  --    POTASSIUM mmol/L 3.6 3.8 3.6   < > 5.4*  --    CHLORIDE mmol/L 102 110* 107   < > 96*  --    CO2 mmol/L 25.0 21.3* 22.0   < > 19.0*  --    BUN mg/dL 30* 27* 30*   < > 38*  --    CREATININE mg/dL 1.63* 1.50* 1.65*   < > 3.13*  --    CALCIUM mg/dL 8.8 8.4* 8.2*   < > 8.3*  --    BILIRUBIN mg/dL  --  0.3  --   --  0.5  --    ALK PHOS U/L  --  73  --   --  77  --    ALT (SGPT) U/L  --  13  --   --  <5  --    AST (SGOT) U/L  --  16  --   --  29  --    GLUCOSE mg/dL 99 99 104*   < > 141*  --    WBC 10*3/mm3 7.71 7.59 8.00   < >  --  25.78*   HEMOGLOBIN g/dL 9.9* 9.2* 8.9*   < >  --  10.9*   PLATELETS 10*3/mm3 288 231 177   < >  --  190   INR   --   --   --   --   --  1.39*   PROBNP pg/mL  --   --  7,540.0*  --   --   --    PROCALCITONIN ng/mL  --  5.09*  --   --  48.30*  --     < > = values in this interval not displayed.              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  We can follow him in the office for this in the future.  This has been managed with low-dose prednisone by his rheumatologist in [facility] for more than 10 years.  He has completed proper antibiotics here which would cover for possible pneumonia.  The patient is currently still on vancomycin for cellulitis of the legs.  His procalcitonin has responded  dramatically.  He has been on room air for several days.  His wife has requested for him to follow-up with us for pulmonology instead of [facility].  I will make those arrangements.  He needs to follow-up at [place] for his rheumatologist.  We will sign off        Matt Lin MD  9/8/2024

## 2024-09-08 NOTE — PLAN OF CARE
Goal Outcome Evaluation:      Pt oriented to self and place, RA while awake, VSS. No acute changes during this shift. Family currently at bedside. Pt sleeps with CPAP from home. Purewick in place. IV Rocephin administered. IV lasix administered as ordered by Nephrology. Bed alarm on. Call light within reach. Plan of care ongoing.

## 2024-09-08 NOTE — PROGRESS NOTES
Name: Allan Kruger ADMIT: 2024   : 1940  PCP: Provider, No Known    MRN: [id number] LOS: 6 days   AGE/SEX: 84 y.o. male  ROOM: [facility]     Subjective   Subjective     Continues to diurese well. States breathing is better.       Intake/Output Summary (Last 24 hours) at 2024 1335  Last data filed at 2024 1310  Gross per 24 hour   Intake --   Output 3400 ml   Net -3400 ml                Objective   Objective   Vital Signs  Temp:  [98.4 °F (36.9 °C)-99.5 °F (37.5 °C)] 99.5 °F (37.5 °C)  Heart Rate:  [64-78] 78  Resp:  [16-18] 18  BP: (128-161)/(64-76) 128/70  SpO2:  [95 %-100 %] 100 %  on   ;   Device (Oxygen Therapy): room air  Body mass index is 31.85 kg/m².  Physical Exam  Vitals reviewed.   Constitutional:       General: He is not in acute distress.     Appearance: He is obese.   Cardiovascular:      Rate and Rhythm: Normal rate.   Pulmonary:      Effort: Pulmonary effort is normal. No respiratory distress.   Abdominal:      General: There is no distension.      Palpations: Abdomen is soft.   Musculoskeletal:      Right lower leg: Edema present.      Left lower leg: Edema present.   Skin:     General: Skin is warm and dry.      Comments: LLE remains pink and warm   Neurological:      General: No focal deficit present.      Mental Status: He is alert.   Psychiatric:      Comments: Flat affect.  Does not say much       Results Review     I reviewed the patient's new clinical results.  Results from last 7 days   Lab Units 24  0433 24  04224  0557 24   WBC 10*3/mm3 7.71 7.59 8.00 13.94*   HEMOGLOBIN g/dL 9.9* 9.2* 8.9* 9.0*   PLATELETS 10*3/mm3 288 231 177 183     Results from last 7 days   Lab Units 24  0433 24  0429 24  0557 24  042   SODIUM mmol/L 140 144 140 141   POTASSIUM mmol/L 3.6 3.8 3.6 4.0   CHLORIDE mmol/L 102 110* 107 106   CO2 mmol/L 25.0 21.3* 22.0 21.0*   BUN mg/dL 30* 27* 30* 34*   CREATININE mg/dL 1.63* 1.50* 1.65* 2.04*  "  GLUCOSE mg/dL 99 99 104* 92   EGFR mL/min/1.73 40.8* 45.1* 40.2* 31.2*     Results from last 7 days   Lab Units 09/08/24 0433 09/07/24 0429 09/06/24  0557 09/05/24  0427 09/02/24  2221   ALBUMIN g/dL 3.5 3.1* 3.1* 3.2* 3.3*   BILIRUBIN mg/dL  --  0.3  --   --  0.5   ALK PHOS U/L  --  73  --   --  77   AST (SGOT) U/L  --  16  --   --  29   ALT (SGPT) U/L  --  13  --   --  <5     Results from last 7 days   Lab Units 09/08/24 0433 09/07/24 0429 09/06/24 0557 09/05/24 0427 09/03/24  0333 09/02/24  2221   CALCIUM mg/dL 8.8 8.4* 8.2* 8.3*   < > 8.3*   ALBUMIN g/dL 3.5 3.1* 3.1* 3.2*  --  3.3*   MAGNESIUM mg/dL 2.1  --  2.0 2.1  --  1.8   PHOSPHORUS mg/dL 3.2  --  2.6 3.2  --   --     < > = values in this interval not displayed.     Results from last 7 days   Lab Units 09/07/24 0429 09/04/24  0545 09/03/24  2020 09/03/24  1354 09/03/24  1002 09/02/24  2325 09/02/24  2221   PROCALCITONIN ng/mL 5.09*  --   --   --   --   --  48.30*   LACTATE mmol/L  --  1.5 2.5* 2.2* 2.8*   < >  --     < > = values in this interval not displayed.     No results found for: \"HGBA1C\", \"POCGLU\"    CT Chest Without Contrast Diagnostic    Result Date: 9/6/2024  1. Patchy groundglass infiltrates in the lung apices and upper lobe are favored to be infectious/inflammatory or edema 2. Peripheral and basilar predominant interstitial septal thickening with groundglass opacity associated with areas of bronchiectasis. These findings are indeterminate. Areas of chronic fibrotic change are suggested given the bronchiectasis however acute superimposed infectious/inflammatory process or edema may also be present. Follow-up recommended to evaluate stability/clearing 3. Possible ductal dilation or cystic lesion involving the pancreatic tail associated with some calcifications, indeterminate without contrast. Suggest evaluation with MRI abdomen or CT abdomen as described above   Radiation dose reduction techniques were utilized, including automated " exposure control and exposure modulation based on body size.   This report was finalized on 9/6/2024 3:14 PM by Dr. Juan A Jay M.D on Workstation: [id number]       I have personally reviewed all medications:  Scheduled Medications  aspirin, 81 mg, Oral, Daily  atorvastatin, 10 mg, Oral, Daily  carbidopa-levodopa CR, 2 tablet, Oral, Daily  donepezil, 10 mg, Oral, Daily  fluticasone, 2 spray, Nasal, Daily  guaiFENesin, 600 mg, Oral, BID  heparin (porcine), 5,000 Units, Subcutaneous, Q8H  ipratropium-albuterol, 3 mL, Nebulization, 4x Daily - RT  memantine, 10 mg, Oral, Daily  predniSONE, 12.5 mg, Oral, Daily  sodium chloride, 10 mL, Intravenous, Q12H  tamsulosin, 0.4 mg, Oral, BID  torsemide, 20 mg, Oral, Daily  vancomycin, 1,250 mg, Intravenous, Q24H    Infusions     Diet  Diet: Cardiac; Healthy Heart (2-3 Na+); Fluid Consistency: Thin (IDDSI 0)         Assessment/Plan     Active Hospital Problems    Diagnosis  POA    **Sepsis [A41.9]  Yes    PMR (polymyalgia rheumatica) [M35.3]  Yes    Current chronic use of systemic steroids [Z79.52]  Not Applicable    Diarrhea [R19.7]  Yes    JASS (acute kidney injury) [N17.9]  Yes    Stage 3b chronic kidney disease [N18.32]  Yes    Dementia without behavioral disturbance [F03.90]  Yes    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       84 y.o. male with history of PMR on chronic prednisone, CKD 3B, seronegative RA, Parkinson's, UIP and Alzheimer's dementia presenting with acutely altered mental status and hypotension.    Sepsis: Suspected to be due to LLE cellulitis.  Blood cultures negative so far and MRSA swab negative. UA negative. CT chest findings most c/w edema. CT abdomen and pelvis did mention some perinephric stranding however UA did not correlate.   -  nonhealing wound on his left heel noted. Mri  obtained which shows a local wound at the medial and plantar junction of the midfoot and forefoot extending through the subcutaneous fat and with focal extension.  No evidence of osteomyelitis.      BLE edema  -dopplers with superficial thrombophlebitis, no DVT. He is on aspirin.    Pulmonary edema  Moderate Aortic Stenosis  -I do not believe the aortic stenosis is severe enough to result in repair with a mean pressure gradient of 30.4.  Pulmonary edema most likely due to IV fluids he got upon presentation for hypotension and jass  -recommend outpatient cardiology follow up for monitoring of aortic stenosis    Pancreatic lesion  Obtain CA 19-9- wnl. No additional testing at this time.     Interstitial lung disease  On prednisone 12.5mg. CT chest ordered.    Pulmonary edema  S/p IV lasix with brisk diuresis. Spot dosing by nephrolopgy    JASS/CKD 3B, improving. Nephrology following    PMR: On home dose prednisone    Diarrhea: C. difficile toxin negative.     Heparin SC for dvt ppx   Full code  Dispo: plan to dc on Monday, 9/9      Jimmie Molina MD  [facility]  09/08/24  13:34 EDT

## 2024-09-09 LAB
ANION GAP SERPL CALCULATED.3IONS-SCNC: 13.1 MMOL/L (ref 5–15)
BUN SERPL-MCNC: 29 MG/DL (ref 8–23)
BUN/CREAT SERPL: 17.7 (ref 7–25)
CALCIUM SPEC-SCNC: 8.6 MG/DL (ref 8.6–10.5)
CHLORIDE SERPL-SCNC: 102 MMOL/L (ref 98–107)
CO2 SERPL-SCNC: 22.9 MMOL/L (ref 22–29)
CREAT SERPL-MCNC: 1.64 MG/DL (ref 0.76–1.27)
DEPRECATED RDW RBC AUTO: 48.2 FL (ref 37–54)
EGFRCR SERPLBLD CKD-EPI 2021: 40.5 ML/MIN/1.73
ERYTHROCYTE [DISTWIDTH] IN BLOOD BY AUTOMATED COUNT: 14.8 % (ref 12.3–15.4)
GLUCOSE SERPL-MCNC: 106 MG/DL (ref 65–99)
HCT VFR BLD AUTO: 30 % (ref 37.5–51)
HGB BLD-MCNC: 9.8 G/DL (ref 13–17.7)
MCH RBC QN AUTO: 28.8 PG (ref 26.6–33)
MCHC RBC AUTO-ENTMCNC: 32.7 G/DL (ref 31.5–35.7)
MCV RBC AUTO: 88.2 FL (ref 79–97)
PLATELET # BLD AUTO: 301 10*3/MM3 (ref 140–450)
PMV BLD AUTO: 9.2 FL (ref 6–12)
POTASSIUM SERPL-SCNC: 3.8 MMOL/L (ref 3.5–5.2)
RBC # BLD AUTO: 3.4 10*6/MM3 (ref 4.14–5.8)
SODIUM SERPL-SCNC: 138 MMOL/L (ref 136–145)
WBC NRBC COR # BLD AUTO: 7.96 10*3/MM3 (ref 3.4–10.8)

## 2024-09-09 PROCEDURE — 94799 UNLISTED PULMONARY SVC/PX: CPT

## 2024-09-09 PROCEDURE — 25010000002 HEPARIN (PORCINE) PER 1000 UNITS: Performed by: STUDENT IN AN ORGANIZED HEALTH CARE EDUCATION/TRAINING PROGRAM

## 2024-09-09 PROCEDURE — 94664 DEMO&/EVAL PT USE INHALER: CPT

## 2024-09-09 PROCEDURE — 80048 BASIC METABOLIC PNL TOTAL CA: CPT | Performed by: STUDENT IN AN ORGANIZED HEALTH CARE EDUCATION/TRAINING PROGRAM

## 2024-09-09 PROCEDURE — 85027 COMPLETE CBC AUTOMATED: CPT | Performed by: HOSPITALIST

## 2024-09-09 PROCEDURE — 97530 THERAPEUTIC ACTIVITIES: CPT

## 2024-09-09 PROCEDURE — 63710000001 PREDNISONE PER 5 MG: Performed by: HOSPITALIST

## 2024-09-09 PROCEDURE — 94761 N-INVAS EAR/PLS OXIMETRY MLT: CPT

## 2024-09-09 RX ORDER — POTASSIUM CHLORIDE 1500 MG/1
20 TABLET, EXTENDED RELEASE ORAL DAILY
Qty: 30 TABLET | Refills: 0 | Status: SHIPPED | OUTPATIENT
Start: 2024-09-10

## 2024-09-09 RX ORDER — TORSEMIDE 20 MG/1
20 TABLET ORAL DAILY
Qty: 30 TABLET | Refills: 0 | Status: SHIPPED | OUTPATIENT
Start: 2024-09-09

## 2024-09-09 RX ORDER — POTASSIUM CHLORIDE 750 MG/1
20 TABLET, FILM COATED, EXTENDED RELEASE ORAL 2 TIMES DAILY WITH MEALS
Status: DISCONTINUED | OUTPATIENT
Start: 2024-09-09 | End: 2024-09-10 | Stop reason: HOSPADM

## 2024-09-09 RX ORDER — HYDROXYZINE HYDROCHLORIDE 25 MG/1
25 TABLET, FILM COATED ORAL ONCE AS NEEDED
Status: COMPLETED | OUTPATIENT
Start: 2024-09-09 | End: 2024-09-09

## 2024-09-09 RX ORDER — HYDROXYZINE HYDROCHLORIDE 25 MG/1
25 TABLET, FILM COATED ORAL ONCE
Status: COMPLETED | OUTPATIENT
Start: 2024-09-09 | End: 2024-09-09

## 2024-09-09 RX ADMIN — TORSEMIDE 20 MG: 20 TABLET ORAL at 10:02

## 2024-09-09 RX ADMIN — IPRATROPIUM BROMIDE AND ALBUTEROL SULFATE 3 ML: .5; 3 SOLUTION RESPIRATORY (INHALATION) at 20:26

## 2024-09-09 RX ADMIN — IPRATROPIUM BROMIDE AND ALBUTEROL SULFATE 3 ML: .5; 3 SOLUTION RESPIRATORY (INHALATION) at 15:45

## 2024-09-09 RX ADMIN — POTASSIUM CHLORIDE 20 MEQ: 750 TABLET, EXTENDED RELEASE ORAL at 17:44

## 2024-09-09 RX ADMIN — GUAIFENESIN 600 MG: 600 TABLET, EXTENDED RELEASE ORAL at 21:37

## 2024-09-09 RX ADMIN — GUAIFENESIN 600 MG: 600 TABLET, EXTENDED RELEASE ORAL at 10:01

## 2024-09-09 RX ADMIN — ASPIRIN 81 MG: 81 TABLET, COATED ORAL at 10:01

## 2024-09-09 RX ADMIN — HEPARIN SODIUM 5000 UNITS: 5000 INJECTION INTRAVENOUS; SUBCUTANEOUS at 07:02

## 2024-09-09 RX ADMIN — TAMSULOSIN HYDROCHLORIDE 0.4 MG: 0.4 CAPSULE ORAL at 21:37

## 2024-09-09 RX ADMIN — CARBIDOPA AND LEVODOPA 2 TABLET: 50; 200 TABLET, EXTENDED RELEASE ORAL at 10:02

## 2024-09-09 RX ADMIN — HEPARIN SODIUM 5000 UNITS: 5000 INJECTION INTRAVENOUS; SUBCUTANEOUS at 13:49

## 2024-09-09 RX ADMIN — MEMANTINE HYDROCHLORIDE 10 MG: 10 TABLET, FILM COATED ORAL at 10:02

## 2024-09-09 RX ADMIN — Medication 10 ML: at 21:36

## 2024-09-09 RX ADMIN — PREDNISONE 12.5 MG: 10 TABLET ORAL at 10:02

## 2024-09-09 RX ADMIN — HYDROXYZINE HYDROCHLORIDE 25 MG: 25 TABLET ORAL at 03:25

## 2024-09-09 RX ADMIN — HYDROXYZINE HYDROCHLORIDE 25 MG: 25 TABLET ORAL at 23:01

## 2024-09-09 RX ADMIN — Medication 10 ML: at 10:04

## 2024-09-09 RX ADMIN — HEPARIN SODIUM 5000 UNITS: 5000 INJECTION INTRAVENOUS; SUBCUTANEOUS at 21:37

## 2024-09-09 RX ADMIN — IPRATROPIUM BROMIDE AND ALBUTEROL SULFATE 3 ML: .5; 3 SOLUTION RESPIRATORY (INHALATION) at 12:19

## 2024-09-09 RX ADMIN — ATORVASTATIN CALCIUM 10 MG: 20 TABLET, FILM COATED ORAL at 10:01

## 2024-09-09 RX ADMIN — FLUTICASONE PROPIONATE 2 SPRAY: 50 SPRAY, METERED NASAL at 10:04

## 2024-09-09 RX ADMIN — TAMSULOSIN HYDROCHLORIDE 0.4 MG: 0.4 CAPSULE ORAL at 10:02

## 2024-09-09 RX ADMIN — DONEPEZIL HYDROCHLORIDE 10 MG: 10 TABLET, FILM COATED ORAL at 10:02

## 2024-09-09 RX ADMIN — IPRATROPIUM BROMIDE AND ALBUTEROL SULFATE 3 ML: .5; 3 SOLUTION RESPIRATORY (INHALATION) at 08:55

## 2024-09-09 NOTE — PROGRESS NOTES
"Nutrition Services    Patient Name:  Allan Kruger  YOB: 1940  MRN: [id number]  Admit Date:  9/2/2024  Nutrition Services    Patient Name: Allan Kruger  YOB: 1940  MRN: [id number]  Admission date: 9/2/2024      PROGRESS NOTE      Encounter Information: Follow up       PO Diet: Diet: Cardiac; Healthy Heart (2-3 Na+); Fluid Consistency: Thin (IDDSI 0)   PO Supplements: Boost glucose--drinking   PO Intake:  Fair intake--would be better if at home per family       Labs (reviewed below): Glu 106, bun 29, cr 1.64    Skin reviewed   GI Function:  BM 9/7       Nutrition Intervention: Visited pt and spoke with family. He is tolerating po diet with adequate intake and drinking the Boost Glucose supplements.  Po intake encouraged.  RD to follow     Results from last 7 days   Lab Units 09/09/24 0549 09/08/24 0433 09/07/24 0429 09/03/24  0333 09/02/24  2221   SODIUM mmol/L 138 140 144   < > 130*   POTASSIUM mmol/L 3.8 3.6 3.8   < > 5.4*   CHLORIDE mmol/L 102 102 110*   < > 96*   CO2 mmol/L 22.9 25.0 21.3*   < > 19.0*   BUN mg/dL 29* 30* 27*   < > 38*   CREATININE mg/dL 1.64* 1.63* 1.50*   < > 3.13*   CALCIUM mg/dL 8.6 8.8 8.4*   < > 8.3*   BILIRUBIN mg/dL  --   --  0.3  --  0.5   ALK PHOS U/L  --   --  73  --  77   ALT (SGPT) U/L  --   --  13  --  <5   AST (SGOT) U/L  --   --  16  --  29   GLUCOSE mg/dL 106* 99 99   < > 141*    < > = values in this interval not displayed.     Results from last 7 days   Lab Units 09/09/24  0549 09/08/24  0433 09/07/24  0429 09/06/24  0557 09/05/24  0427   MAGNESIUM mg/dL  --  2.1  --  2.0 2.1   PHOSPHORUS mg/dL  --  3.2  --  2.6 3.2   HEMOGLOBIN g/dL 9.8* 9.9*   < > 8.9* 9.0*   HEMATOCRIT % 30.0* 31.1*   < > 27.5* 27.8*    < > = values in this interval not displayed.     COVID19   Date Value Ref Range Status   09/06/2024 Not Detected Not Detected - Ref. Range Final     No results found for: \"HGBA1C\"        RD to follow up per protocol.    Electronically signed " by:  Mary Greer RD  09/09/24 14:29 EDT

## 2024-09-09 NOTE — PLAN OF CARE
Goal Outcome Evaluation:  Plan of Care Reviewed With: patient, spouse, caregiver           Outcome Evaluation: Pt agreed to PT session, fatigued quickly, but was encouraged by caregiver and wife to continue w/activity, probably would have benefitted from seated rest, pt req min/mod 2 for safe amb ~25ft required 3 standing rests to complete, incr SOA, O2 SATS not being monitored , caregiver states that is his baseline breathing and usu SATS are still high when checked at home, pt req asisst of 1 OOB and SBA 1 into bed , will have hospital bed at home and all other equipment needed per caregiver, plans home w/HH at [place]      Anticipated Discharge Disposition (PT): home with assist, home with home health

## 2024-09-09 NOTE — ACP (ADVANCE CARE PLANNING)
Met with the patient's wife this morning.  We discussed his current condition his underlying comorbidities and what life is like at home.  He is becoming increasingly less verbal at times although he still recognizes his family his mobility has decreased in the last 6 months.  Its become increasingly difficult for them to manage at home on their own but they are doing the best that they can.  Ultimately they want to keep him at home as long as possible.  We discussed his current quality of life and discussed current goals and treatment options.  At multiple times throughout the conversation his wife expressed that she wants to keep him healthy and safe at home and does not want him in a care facility or other situation.  She just wants to maintain his dignity and does not want the life that the patient's father experienced of constantly feeling lost and abandoned in a nursing home.  We discussed options to help make that happen.  We also discussed his current prognosis.  From the standpoint of getting him out of the hospital this clearly is not much of an issue at this point as he is medically stable for discharge from the hospital at this time.  Ultimately though it 84 years old with underlying dementia and parkinsonism as well as underlying other medical issues the likelihood of him making a full recovery to his prior level of function is extremely low.  We discussed the natural course of progression of dementia and the fact that he is likely to not fully recovered to that baseline.  We discussed his CODE STATUS and the likelihood that if he were to undergo a cardiopulmonary resuscitation it would not add much clinical benefit to his overall quality of life at this time.  His wife states that he would want chest compressions and would want be put on the ventilator and then make decisions at that time whether or not his quality of life was worth it.  She wishes to remain a full code.  We discussed hospice and  hospice support at home she is not interested in this at this time.  At multiple points during the conversation she was visibly tearful and was given time to speak and process her emotions.  Following my discussion his discharge was canceled and the plan had been further conversations regarding his goals of care and plans moving forward.  However in discussion on multidisciplinary rounds I guess his wife is not real happy with the discussion we had this morning.  I suspect she heard some things that she probably was not ready to fully here.  I did ask to speak with her daughter who is a physician or her son on the phone to relay the information that was relayed this morning.  She did not want to give me the daughter's phone number but did try to call her on the cell phone in the room but we were unable to get a hold of her.  Ultimately at this point would recommend DNR based on his guidelines regarding his overall goals of care as I would not add any benefit to his overall quality life to undergo cardiopulmonary resuscitation.  I would also recommend additional conversations regarding his advance care planning and goals of care be obtained in the outpatient setting with his primary care physician.  25 minutes was spent at the bedside with the patient and his wife dedicated to advance care planning discussion  Electronically signed by Frankie Barrett MD, 09/09/24, 12:04 PM EDT.

## 2024-09-09 NOTE — DISCHARGE PLACEMENT REQUEST
"Allan Kruger (84 y.o. Male)       Date of Birth   1940    Social Security Number       Address   [address] [place] [address] [place] [address]    Home Phone   [phone]    MRN   [id number]       [employer]   None    Marital Status                               Admission Date   9/2/24    Admission Type   Emergency    Admitting Provider   Mateo Lee MD    Attending Provider   Frankie Barrett MD    Department, Room/Bed   [address], [address]       Discharge Date       Discharge Disposition       Discharge Destination                                 Attending Provider: Frankie Barrett MD    Allergies: Augmentin [Amoxicillin-pot Clavulanate], Neosporin [Bacitracin-polymyxin B]    Isolation: None   Infection: None   Code Status: CPR    Ht: 190.5 cm (75\")   Wt: 116 kg (254 lb 13.6 oz)    Admission Cmt: None   Principal Problem: Sepsis [A41.9]                   Active Insurance as of 9/2/2024       Primary Coverage       Payor Plan Insurance Group Employer/Plan Group    ANTH MEDICARE REPLACEMENT ANTHEM MEDICARE ADVANTAGE [id number]       Payor Plan Address Payor Plan Phone Number Payor Plan Fax Number Effective Dates    [address] [phone]  6/1/2024 - None Entered    [facility] [id number]         Subscriber Name Subscriber Birth Date Member ID       ALLAN KRUGER 1940 [id number]                     Emergency Contacts        (Rel.) Home Phone Work Phone Mobile Phone    Allan Zuñiga III (Son) -- -- [phone]    Dolores Hewitt (Relative) -- -- [phone]    Ashlee Mckeon (Care Giver) -- -- [phone]              "

## 2024-09-09 NOTE — CASE MANAGEMENT/SOCIAL WORK
Continued Stay Note  [facility]     Patient Name: Allan Kruger  MRN: [id number]  Today's Date: 9/9/2024    Admit Date: 9/2/2024    Plan: Home with family, HH, new DME, and 24/7 caregivers.   Discharge Plan       Row Name 09/09/24 1634       Plan    Plan Home with family, HH, new DME, and 24/7 caregivers.    Patient/Family in Agreement with Plan yes    Plan Comments Spoke with patient's spouse and caregiver at bedside. Introduced self. Family and caregiver requested HH referral to [facility], patient insurance out of network, second choice is [facility]; referral pending. Family requesting Hospital bed and [facility] through [facility]. Referral placed spoke with Leeanne/ [facility].                   Discharge Codes    No documentation.                 Expected Discharge Date and Time       Expected Discharge Date Expected Discharge Time    Sep 9, 2024               Nya Ellis RN

## 2024-09-09 NOTE — NURSING NOTE
No new issues overnight. Wife would like to talk to case management today about possible rehab placement but also has a lot of concern about him being somewhere where she cannot stay, as he tends to get agitated when she is not present. RASTA, DEON, baseline mentation.

## 2024-09-09 NOTE — PROGRESS NOTES
[facility] Progress Note      Patient Name: Allan Kruger  : 1940  MRN: [id number]  Primary Care Physician:  Provider, No Known  Date of admission: 2024    Subjective     Interval History:   Follow-up on JASS and CKD stage IIIa-IIIb    Breathing is comfortable on RA  UOP 2.1 L yesterday after IV furosemide   No CP or N/V/D; eating well    Review of Systems:   As noted above    Objective     Vitals:   Temp:  [98.2 °F (36.8 °C)-99.5 °F (37.5 °C)] 98.2 °F (36.8 °C)  Heart Rate:  [66-78] 66  Resp:  [16-18] 16  BP: (128-161)/(64-74) 145/71    Intake/Output Summary (Last 24 hours) at 2024  Last data filed at 2024 1310  Gross per 24 hour   Intake --   Output 3400 ml   Net -3400 ml       Physical Exam:    General: Awake but blunted, minimally verbal, chr ill; obese; NAD   Skin: warm and dry, erythema to left ankle  HEENT: oral mucosa normal, nonicteric sclera  Neck: supple, no JVD  Lungs: Bibasilar crackles with exp wheezing, nonlabored   Heart: RRR, + 2/6 murmur, no rub  Abdomen: soft, nontender, distended, BS +  : no palpable bladder  Extremities: +2-3 edema on left, 2+ edema on right, left foot appears to have a Charcot deformity; left foot wound dressed  Neuro: Moves all extremities, follows commands    Scheduled Meds:     aspirin, 81 mg, Oral, Daily  atorvastatin, 10 mg, Oral, Daily  carbidopa-levodopa CR, 2 tablet, Oral, Daily  donepezil, 10 mg, Oral, Daily  fluticasone, 2 spray, Nasal, Daily  guaiFENesin, 600 mg, Oral, BID  heparin (porcine), 5,000 Units, Subcutaneous, Q8H  ipratropium-albuterol, 3 mL, Nebulization, 4x Daily - RT  memantine, 10 mg, Oral, Daily  predniSONE, 12.5 mg, Oral, Daily  sodium chloride, 10 mL, Intravenous, Q12H  tamsulosin, 0.4 mg, Oral, BID  torsemide, 20 mg, Oral, Daily      IV Meds:          Results Reviewed:   I have personally reviewed the results from the time of this admission to 2024 20:47 EDT     Results from last 7 days    Lab Units 09/08/24  0433 09/07/24  0429 09/06/24  0557 09/03/24  0333 09/02/24  2221   SODIUM mmol/L 140 144 140   < > 130*   POTASSIUM mmol/L 3.6 3.8 3.6   < > 5.4*   CHLORIDE mmol/L 102 110* 107   < > 96*   CO2 mmol/L 25.0 21.3* 22.0   < > 19.0*   BUN mg/dL 30* 27* 30*   < > 38*   CREATININE mg/dL 1.63* 1.50* 1.65*   < > 3.13*   CALCIUM mg/dL 8.8 8.4* 8.2*   < > 8.3*   BILIRUBIN mg/dL  --  0.3  --   --  0.5   ALK PHOS U/L  --  73  --   --  77   ALT (SGPT) U/L  --  13  --   --  <5   AST (SGOT) U/L  --  16  --   --  29   GLUCOSE mg/dL 99 99 104*   < > 141*    < > = values in this interval not displayed.       Estimated Creatinine Clearance: 44.7 mL/min (A) (by C-G formula based on SCr of 1.63 mg/dL (H)).    Results from last 7 days   Lab Units 09/08/24 0433 09/06/24 0557 09/05/24  0427   MAGNESIUM mg/dL 2.1 2.0 2.1   PHOSPHORUS mg/dL 3.2 2.6 3.2       Results from last 7 days   Lab Units 09/06/24  0557   URIC ACID mg/dL 8.2*       Results from last 7 days   Lab Units 09/08/24 0433 09/07/24 0429 09/06/24  0557 09/05/24  0427 09/04/24  0545   WBC 10*3/mm3 7.71 7.59 8.00 13.94* 17.19*   HEMOGLOBIN g/dL 9.9* 9.2* 8.9* 9.0* 9.2*   PLATELETS 10*3/mm3 288 231 177 183 188       Results from last 7 days   Lab Units 09/02/24  2139   INR  1.39*       Assessment / Plan     ASSESSMENT:  JASS on UVR1n-m, nonoliguric, improving, likely prerenal from hypotension and suspected sepsis syndrome.  Volume overloaded by exam with crackles and edema, responding to diuretic.  Potassium is low-normal.  Urine with no blood, 100 mg/dL protein, few white blood cells, and squamous epithelial cells.   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  No evidence for osteomyelitis by MRI  Interstitial lung disease  Anemia, normocytic, stable     PLAN:  Continue torsemide 20 mg daily  Begin scheduled oral potassium 20 mEq daily  Discussed with wife at bedside  Home anytime from renal view    Thank you for involving us in the care of Allan Kruger.  Please feel free to call with any questions.    Que Ruvalcaba MD  09/08/24  20:47 EDT    [facility]  [phone]    Please note that portions of this note were completed with a voice recognition program.

## 2024-09-09 NOTE — PROGRESS NOTES
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    Breathing is comfortable on RA at rest  + ERVIN when walking with PT earlier today  UOP 1.9 L yesterday after IV furosemide, currently has 0.9 L in canister  No CP or N/V/D    Review of Systems:   As noted above    Objective     Vitals:   Temp:  [98.1 °F (36.7 °C)-98.5 °F (36.9 °C)] 98.5 °F (36.9 °C)  Heart Rate:  [66-74] 66  Resp:  [16-20] 18  BP: (144-158)/(64-79) 158/64    Intake/Output Summary (Last 24 hours) at 2024 1603  Last data filed at 2024 2100  Gross per 24 hour   Intake --   Output 650 ml   Net -650 ml       Physical Exam:    General: Sleeping but rouses easily, minimally verbal, chr ill; obese; NAD   Skin: warm and dry, erythema to left ankle  HEENT: oral mucosa normal, nonicteric sclera  Neck: supple, no JVD  Lungs: Bibasilar crackles, no wheezing, nonlabored on RA  Heart: RRR, + 2/6 murmur, no rub  Abdomen: soft, nontender, distended, BS +  : no palpable bladder  Extremities: +2-3 edema on left, 2+ edema on right, left foot appears to have a Charcot deformity; left foot wound dressed  Neuro: Moves all extremities, follows commands    Scheduled Meds:     aspirin, 81 mg, Oral, Daily  atorvastatin, 10 mg, Oral, Daily  carbidopa-levodopa CR, 2 tablet, Oral, Daily  donepezil, 10 mg, Oral, Daily  fluticasone, 2 spray, Nasal, Daily  guaiFENesin, 600 mg, Oral, BID  heparin (porcine), 5,000 Units, Subcutaneous, Q8H  ipratropium-albuterol, 3 mL, Nebulization, 4x Daily - RT  memantine, 10 mg, Oral, Daily  potassium chloride, 20 mEq, Oral, Daily  predniSONE, 12.5 mg, Oral, Daily  sodium chloride, 10 mL, Intravenous, Q12H  tamsulosin, 0.4 mg, Oral, BID  torsemide, 20 mg, Oral, Daily      IV Meds:          Results  Reviewed:   I have personally reviewed the results from the time of this admission to 9/9/2024 16:03 EDT     Results from last 7 days   Lab Units 09/09/24  0549 09/08/24  0433 09/07/24  0429 09/03/24  0333 09/02/24  2221   SODIUM mmol/L 138 140 144   < > 130*   POTASSIUM mmol/L 3.8 3.6 3.8   < > 5.4*   CHLORIDE mmol/L 102 102 110*   < > 96*   CO2 mmol/L 22.9 25.0 21.3*   < > 19.0*   BUN mg/dL 29* 30* 27*   < > 38*   CREATININE mg/dL 1.64* 1.63* 1.50*   < > 3.13*   CALCIUM mg/dL 8.6 8.8 8.4*   < > 8.3*   BILIRUBIN mg/dL  --   --  0.3  --  0.5   ALK PHOS U/L  --   --  73  --  77   ALT (SGPT) U/L  --   --  13  --  <5   AST (SGOT) U/L  --   --  16  --  29   GLUCOSE mg/dL 106* 99 99   < > 141*    < > = values in this interval not displayed.       Estimated Creatinine Clearance: 44.4 mL/min (A) (by C-G formula based on SCr of 1.64 mg/dL (H)).    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  Volume overloaded by exam with crackles and edema, responding to diuretic.  Potassium and anion gap normal.  Urine with no blood, 100 mg/dL protein, few white blood cells, and squamous epithelial cells.   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  No evidence for osteomyelitis by MRI  Interstitial lung disease  Anemia, normocytic, stable     PLAN:  Continue torsemide 20 mg daily  Increase scheduled oral potassium to 20 mEq BID  Discharge anytime from renal view  Discussed with wife at bedside and with daughter via phone    Thank you for involving us in the care of Allan Kruger.  Please feel free to call with any questions.    Que Ruvalcaba MD  09/09/24  16:03 EDT    [facility]  [phone]    Please note that portions of this note were completed with a voice recognition program.

## 2024-09-09 NOTE — PROGRESS NOTES
[facility] Hospital [facility]    [phone]   LOS: 7 days     Name: Allan Kruger  Age/Sex: 84 y.o. male  :  1940        PCP: Provider, No Known  Chief Complaint   Patient presents with    Altered Mental Status      Subjective   Sleeping soundly on my evaluation.  He did wake up but fell right back asleep.  His wife is present at the bedside.  He slept throughout our entire conversation and was snoring quite loudly.  Per his wife they moved here around [facility] of last year.  Her brother lives here in town and they live in the house next-door.  She has caregivers during the day that help with his care and feels comfortable with things at home.  She is noticed that over the last 2 or 3 months he has become increasingly less verbal but still has some good days and bad days.  He still recognizes his family and his caregivers but does sometimes become disoriented.  He does not drive and was relying on transportation and requires assistance with many of his ADLs.  Unable to assess review of systems    aspirin, 81 mg, Oral, Daily  atorvastatin, 10 mg, Oral, Daily  carbidopa-levodopa CR, 2 tablet, Oral, Daily  donepezil, 10 mg, Oral, Daily  fluticasone, 2 spray, Nasal, Daily  guaiFENesin, 600 mg, Oral, BID  heparin (porcine), 5,000 Units, Subcutaneous, Q8H  ipratropium-albuterol, 3 mL, Nebulization, 4x Daily - RT  memantine, 10 mg, Oral, Daily  potassium chloride, 20 mEq, Oral, Daily  predniSONE, 12.5 mg, Oral, Daily  sodium chloride, 10 mL, Intravenous, Q12H  tamsulosin, 0.4 mg, Oral, BID  torsemide, 20 mg, Oral, Daily           Objective   Vital Signs  Temp:  [98.1 °F (36.7 °C)-99.5 °F (37.5 °C)] 98.2 °F (36.8 °C)  Heart Rate:  [66-78] 74  Resp:  [16-18] 18  BP: (128-147)/(64-79) 147/64  Body mass index is 31.85 kg/m².    Intake/Output Summary (Last 24 hours) at 2024 1152  Last data filed at 2024 2100  Gross per 24 hour   Intake --   Output 1350 ml   Net -1350 ml       Physical Exam  Vitals  reviewed.   Constitutional:       General: He is not in acute distress.     Appearance: He is obese. He is ill-appearing.   Cardiovascular:      Rate and Rhythm: Normal rate and regular rhythm.   Pulmonary:      Effort: Pulmonary effort is normal. No respiratory distress.   Neurological:      Mental Status: He is alert.      Comments: Sleeping soundly unable to fully assess           Results Review:       I reviewed the patient's new clinical results.  Results from last 7 days   Lab Units 09/09/24  0549 09/08/24  0433 09/07/24  0429 09/06/24  0557 09/05/24  0427 09/04/24  0545 09/03/24  0333   WBC 10*3/mm3 7.96 7.71 7.59 8.00 13.94* 17.19* 23.05*   HEMOGLOBIN g/dL 9.8* 9.9* 9.2* 8.9* 9.0* 9.2* 9.8*   PLATELETS 10*3/mm3 301 288 231 177 183 188 183     Results from last 7 days   Lab Units 09/09/24  0549 09/08/24  0433 09/07/24  0429 09/06/24  0557 09/05/24  0427 09/04/24  0545 09/03/24  0333 09/02/24  2221   SODIUM mmol/L 138 140 144 140 141 138 134* 130*   POTASSIUM mmol/L 3.8 3.6 3.8 3.6 4.0 3.8 4.4 5.4*   CHLORIDE mmol/L 102 102 110* 107 106 104 98 96*   CO2 mmol/L 22.9 25.0 21.3* 22.0 21.0* 18.2* 18.3* 19.0*   BUN mg/dL 29* 30* 27* 30* 34* 37* 38* 38*   CREATININE mg/dL 1.64* 1.63* 1.50* 1.65* 2.04* 2.22* 3.05* 3.13*   CALCIUM mg/dL 8.6 8.8 8.4* 8.2* 8.3* 8.0* 7.9* 8.3*   MAGNESIUM mg/dL  --  2.1  --  2.0 2.1  --   --  1.8   PHOSPHORUS mg/dL  --  3.2  --  2.6 3.2  --   --   --    Estimated Creatinine Clearance: 44.4 mL/min (A) (by C-G formula based on SCr of 1.64 mg/dL (H)).      Assessment & Plan   Active Hospital Problems    Diagnosis  POA    **Sepsis [A41.9]  Yes    PMR (polymyalgia rheumatica) [M35.3]  Yes    Current chronic use of systemic steroids [Z79.52]  Not Applicable    Diarrhea [R19.7]  Yes    JASS (acute kidney injury) [N17.9]  Yes    Stage 3b chronic kidney disease [N18.32]  Yes    Dementia without behavioral disturbance [F03.90]  Yes    Rheumatoid arthritis with negative rheumatoid factor [M06.00]   Yes    Parkinson disease [G20.A1]  Yes    Pulmonary fibrosis [J84.10]  Unknown      Resolved Hospital Problems   No resolved problems to display.       PLAN  This is an 84-year-old gentleman with a history of parkinsonism, Alzheimer's dementia, pulmonary fibrosis, polymyalgia rheumatica, chronic kidney disease stage IIIb and anemia of chronic disease who presents to the hospital with altered mental status and was found to have sepsis of unclear etiology.  Ultimately it was determined that he likely has lower extremity cellulitis related to recent foot issues and is being treated with antibiotic therapy.  -He is completed antibiotic therapy for his skin soft tissue infection here in the hospital.  Procalcitonin has gone from 48 all the way down to 5 over the course of 5 days.  This can be repeated in the morning to ensure it continues to downtrend or has normalized with completion of antibiotic therapy  -Will need local wound care for his foot wound.  This can be arranged through home health.  -Likely venous stasis and venous insufficiency causing his lower extremity edema.  Recommended ambulation and foot elevation.  -Did have pulmonary edema related to fluid resuscitation from his sepsis and underlying malnutrition  -Remains on steroids chronically for his underlying interstitial lung disease  -Renal function back to baseline  -Loose stools probably related to antibiotic therapy  -Subcu heparin for DVT prophylaxis  -Full code    Disposition  Expected Discharge Date: 9/9/2024; Expected Discharge Time:    I had plan to discharge him today as the plan was for him to go home with private caregivers however after my evaluation his wife is wanting to look at skilled nursing facility.  His case was then discussed on multidisciplinary rounds another plan is home with private caregivers but additional DME will need to be arranged before discharge planning discharge first thing tomorrow morning    Frankie Barrett,  MD [facility]  09/09/24  11:52 EDT

## 2024-09-09 NOTE — PLAN OF CARE
Problem: Adult Inpatient Plan of Care  Goal: Plan of Care Review  Outcome: Ongoing, Progressing  Goal: Patient-Specific Goal (Individualized)  Outcome: Ongoing, Progressing  Goal: Absence of Hospital-Acquired Illness or Injury  Outcome: Ongoing, Progressing  Intervention: Identify and Manage Fall Risk  Recent Flowsheet Documentation  Taken 9/9/2024 1000 by Esthela Alejo RN  Safety Promotion/Fall Prevention:   activity supervised   assistive device/personal items within reach   clutter free environment maintained   fall prevention program maintained   nonskid shoes/slippers when out of bed   room organization consistent   safety round/check completed  Taken 9/9/2024 0800 by Esthela Alejo RN  Safety Promotion/Fall Prevention:   activity supervised   assistive device/personal items within reach   clutter free environment maintained   fall prevention program maintained   nonskid shoes/slippers when out of bed   room organization consistent   safety round/check completed  Intervention: Prevent Skin Injury  Recent Flowsheet Documentation  Taken 9/9/2024 1000 by Esthela Alejo RN  Body Position: position changed independently  Taken 9/9/2024 0900 by Esthela Alejo RN  Skin Protection:   adhesive use limited   incontinence pads utilized  Taken 9/9/2024 0800 by Esthela Alejo RN  Body Position: position changed independently  Intervention: Prevent and Manage VTE (Venous Thromboembolism) Risk  Recent Flowsheet Documentation  Taken 9/9/2024 1000 by Esthela Alejo RN  Activity Management: activity encouraged  Taken 9/9/2024 0800 by Esthela Alejo RN  Activity Management: activity encouraged  Intervention: Prevent Infection  Recent Flowsheet Documentation  Taken 9/9/2024 1000 by Esthela Alejo RN  Infection Prevention:   hand hygiene promoted   personal protective equipment utilized   rest/sleep promoted  Taken 9/9/2024 0800 by Esthela Alejo RN  Infection Prevention:   hand hygiene promoted   personal protective equipment  utilized   rest/sleep promoted  Goal: Optimal Comfort and Wellbeing  Outcome: Ongoing, Progressing  Intervention: Monitor Pain and Promote Comfort  Recent Flowsheet Documentation  Taken 9/9/2024 1415 by Esthela Alejo RN  Pain Management Interventions: care clustered  Intervention: Provide Person-Centered Care  Recent Flowsheet Documentation  Taken 9/9/2024 1415 by Esthela Alejo, RN  Trust Relationship/Rapport:   care explained   choices provided  Taken 9/9/2024 0900 by Esthela Alejo, RN  Trust Relationship/Rapport:   care explained   choices provided  Goal: Readiness for Transition of Care  Outcome: Ongoing, Progressing   Goal Outcome Evaluation:

## 2024-09-09 NOTE — THERAPY TREATMENT NOTE
Patient Name: Allan Kruger  : 1940    MRN: [id number]                              Today's Date: 2024       Admit Date: 2024    Visit Dx:     ICD-10-CM ICD-9-CM   1. Sepsis, due to unspecified organism, unspecified whether acute organ dysfunction present  A41.9 038.9     995.91   2. Acute kidney injury  N17.9 584.9   3. Acute encephalopathy  G93.40 348.30   4. 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    Parkinson disease    Pulmonary fibrosis     Past Medical History:   Diagnosis Date    Alzheimer disease     CKD (chronic kidney disease)     Hyperlipidemia     Interstitial lung disease     Parkinson disease     PMR (polymyalgia rheumatica)     Renal cancer      Past Surgical History:   Procedure Laterality Date    NEPHRECTOMY Right       General Information       Row Name 24 1302          Physical Therapy Time and Intention    Document Type therapy note (daily note)  -     Mode of Treatment individual therapy;physical therapy  -       Row Name 24 1302          General Information    Patient Profile Reviewed yes  -     Existing Precautions/Restrictions fall  -     Barriers to Rehab medically complex;previous functional deficit  -       Row Name 24 1302          Living Environment    People in Home spouse;other (see comments)  has caregivers also, wife cannot physically assist pt  -       Row Name 24 1302          Cognition    Orientation Status (Cognition) oriented to;person;place;unable/difficult to assess  very little conversation  -       Row Name 24 1302          Safety Issues, Functional Mobility    Safety Issues Affecting Function (Mobility)  judgment;positioning of assistive device;problem-solving;safety precaution awareness;safety precautions follow-through/compliance;sequencing abilities  -     Impairments Affecting Function (Mobility) balance;coordination;endurance/activity tolerance;postural/trunk control;shortness of breath;strength  -               User Key  (r) = Recorded By, (t) = Taken By, (c) = Cosigned By      Initials Name Provider Type    Miranda Chavarria PTA Physical Therapist Assistant                   Mobility       Row Name 09/09/24 1303          Bed Mobility    Supine-Sit [place] (Bed Mobility) contact guard;minimum assist (75% patient effort);verbal cues  post lean if not assisted  -     Sit-Supine [place] (Bed Mobility) standby assist;verbal cues  -     Assistive Device (Bed Mobility) bed rails;head of bed elevated  -     Comment, (Bed Mobility) pt perf bed mobility w/o much assist , but was fatigued quickly and was struggling a bit , incr time required  -       Row Name 09/09/24 1303          Sit-Stand Transfer    Sit-Stand [place] (Transfers) 2 person assist;moderate assist (50% patient effort);verbal cues;nonverbal cues (demo/gesture)  -     Assistive Device (Sit-Stand Transfers) walker, front-wheeled  -     Comment, (Sit-Stand Transfer) cues for hand placement, cues to lock in knees and elbows to improve stability and posture  -       Row Name 09/09/24 1303          Gait/Stairs (Locomotion)    [place] Level (Gait) 2 person assist;minimum assist (75% patient effort);moderate assist (50% patient effort);verbal cues;nonverbal cues (demo/gesture)  -     Assistive Device (Gait) walker, front-wheeled  -     Distance in Feet (Gait) 25  -     Deviations/Abnormal Patterns (Gait) demario decreased;festinating/shuffling;stride length decreased;weight shifting decreased;base of support, wide  both LEs ER with amb, has prior foot deviations, wears special orthotic shoes  -     Bilateral Gait  Deviations forward flexed posture  -     Left Sided Gait Deviations decreased knee extension  -     Right Sided Gait Deviations decreased knee extension  -     [place] Level (Stairs) not tested  -     Comment, (Gait/Stairs) pt required more assist as he became fatigued, standing rests req to complete, caregiver and wife present, pt presents weaker than they report from earlier am performance  -               User Key  (r) = Recorded By, (t) = Taken By, (c) = Cosigned By      Initials Name Provider Type    Miranda Chavarria PTA Physical Therapist Assistant                   Obj/Interventions    No documentation.                  Goals/Plan    No documentation.                  Clinical Impression       Row Name 09/09/24 1310          Pain    Pretreatment Pain Rating 0/10 - no pain  -     Posttreatment Pain Rating 0/10 - no pain  -       Row Name 09/09/24 1310          Plan of Care Review    Plan of Care Reviewed With patient;spouse;caregiver  -     Outcome Evaluation Pt agreed to PT session, fatigued quickly, but was encouraged by caregiver and wife to continue w/activity, probably would have benefitted from seated rest, pt req min/mod 2 for safe amb ~25ft required 3 standing rests to complete, incr SOA, O2 SATS not being monitored , caregiver states that is his baseline breathing and usu SATS are still high when checked at home, pt req asisst of 1 OOB and SBA 1 into bed , will have hospital bed at home and all other equipment needed per caregiver, plans home w/HH at [place]  -       Row Name 09/09/24 1310          Therapy Assessment/Plan (PT)    Rehab Potential (PT) good, to achieve stated therapy goals  -     Criteria for Skilled Interventions Met (PT) yes  -       Row Name 09/09/24 1310          Vital Signs    O2 Delivery Pre Treatment room air  -[facility] Name 09/09/24 1310          Positioning and Restraints    Pre-Treatment Position in bed  -     Post Treatment Position bed  -      In Bed fowlers;call light within reach;encouraged to call for assist;exit alarm on;with family/caregiver;side rails up x3  -               User Key  (r) = Recorded By, (t) = Taken By, (c) = Cosigned By      Initials Name Provider Type    Miranda Chavarria PTA Physical Therapist Assistant                   Outcome Measures       Row Name 09/09/24 1316 09/09/24 0900       How much help from another person do you currently need...    Turning from your back to your side while in flat bed without using bedrails? 2  -JM 2  -KW    Moving from lying on back to sitting on the side of a flat bed without bedrails? 2  -JM 2  -KW    Moving to and from a bed to a chair (including a wheelchair)? 3  -JM 2  -KW    Standing up from a chair using your arms (e.g., wheelchair, bedside chair)? 2  -JM 2  -KW    Climbing 3-5 steps with a railing? 1  -JM 1  -KW    To walk in hospital room? 2  -JEANETTE 2  -KW    AM-PAC 6 Clicks Score (PT) 12  - 11  -KW    Highest Level of Mobility Goal 4 --> Transfer to chair/commode  -JM 4 --> Transfer to chair/commode  -KW              User Key  (r) = Recorded By, (t) = Taken By, (c) = Cosigned By      Initials Name Provider Type    Miranda Chavarria PTA Physical Therapist Assistant    Esthela Desai, RN Registered Nurse                                 Physical Therapy Education       Title: PT OT SLP Therapies (In Progress)       Topic: Physical Therapy (In Progress)       Point: Mobility training (In Progress)       Learning Progress Summary             Patient Acceptance, E,D, NR by JEANETTE at 9/9/2024 1317    Acceptance, E, NR,DU by HAI at 9/6/2024 1107    Acceptance, E, NR by HAI at 9/5/2024 1536    Acceptance, E, NR by  at 9/4/2024 1112   Family Acceptance, E,D, NR by JEANETTE at 9/9/2024 1317   Caregiver Acceptance, E,D, NR by JEANETTE at 9/9/2024 1317                         Point: Home exercise program (In Progress)       Learning Progress Summary             Patient Acceptance, E,D, NR by JEANETTE at 9/9/2024  1317   Family Acceptance, E,D, NR by JEANETTE at 9/9/2024 1317   Caregiver Acceptance, E,D, NR by JEANETTE at 9/9/2024 1317                         Point: Body mechanics (In Progress)       Learning Progress Summary             Patient Acceptance, E,D, NR by  at 9/9/2024 1317    Acceptance, E, NR by  at 9/4/2024 1112   Family Acceptance, E,D, NR by JEANETTE at 9/9/2024 1317   Caregiver Acceptance, E,D, NR by  at 9/9/2024 1317                         Point: Precautions (In Progress)       Learning Progress Summary             Patient Acceptance, E,D, NR by JEANETTE at 9/9/2024 1317   Family Acceptance, E,D, NR by JEANETTE at 9/9/2024 1317   Caregiver Acceptance, E,D, NR by  at 9/9/2024 1317                                         User Key       Initials Effective Dates Name Provider Type Discipline    EF 05/31/24 -  Carin Ferguson, PT Physical Therapist PT    JEANETTE 03/07/18 -  Miranda Chavis PTA Physical Therapist Assistant PT     08/22/24 -  Nan Burnett, JACKIE Student PT Student PT                  PT Recommendation and Plan     Plan of Care Reviewed With: patient, spouse, caregiver  Outcome Evaluation: Pt agreed to PT session, fatigued quickly, but was encouraged by caregiver and wife to continue w/activity, probably would have benefitted from seated rest, pt req min/mod 2 for safe amb ~25ft required 3 standing rests to complete, incr SOA, O2 SATS not being monitored , caregiver states that is his baseline breathing and usu SATS are still high when checked at home, pt req asisst of 1 OOB and SBA 1 into bed , will have hospital bed at home and all other equipment needed per caregiver, plans home w/HH at [place]     Time Calculation:         PT Charges       Row Name 09/09/24 1317             Time Calculation    Start Time 1040  -      Stop Time 1105  -      Time Calculation (min) 25 min  -      PT Received On 09/09/24  -      PT - Next Appointment 09/10/24  -                User Key  (r) = Recorded By, (t) = Taken By, (c) =  Cosigned By      Initials Name Provider Type    Miranda Chavarria PTA Physical Therapist Assistant                  Therapy Charges for Today       Code Description Service Date Service Provider Modifiers Qty    06067906565 HC PT THERAPEUTIC ACT EA 15 MIN 9/9/2024 Miranda Chavis PTA GP 2    74003476723 HC PT THER SUPP EA 15 MIN 9/9/2024 Miranda Chavis PTA GP 2            PT G-Codes  Outcome Measure Options: AM-PAC 6 Clicks Basic Mobility (PT)  AM-PAC 6 Clicks Score (PT): 12  PT Discharge Summary  Anticipated Discharge Disposition (PT): home with assist, home with home health    Miranda Chavis PTA  9/9/2024

## 2024-09-10 ENCOUNTER — HOME HEALTH ADMISSION (OUTPATIENT)
Dept: HOME HEALTH SERVICES | Facility: HOME HEALTHCARE | Age: 84
End: 2024-09-10
Payer: COMMERCIAL

## 2024-09-10 ENCOUNTER — READMISSION MANAGEMENT (OUTPATIENT)
Dept: CALL CENTER | Facility: HOSPITAL | Age: 84
End: 2024-09-10
Payer: MEDICARE

## 2024-09-10 ENCOUNTER — DOCUMENTATION (OUTPATIENT)
Dept: HOME HEALTH SERVICES | Facility: HOME HEALTHCARE | Age: 84
End: 2024-09-10
Payer: COMMERCIAL

## 2024-09-10 VITALS
RESPIRATION RATE: 20 BRPM | OXYGEN SATURATION: 92 % | HEART RATE: 67 BPM | BODY MASS INDEX: 31.69 KG/M2 | DIASTOLIC BLOOD PRESSURE: 76 MMHG | HEIGHT: 75 IN | WEIGHT: 254.85 LBS | TEMPERATURE: 97.9 F | SYSTOLIC BLOOD PRESSURE: 146 MMHG

## 2024-09-10 LAB
ALBUMIN SERPL-MCNC: 3.2 G/DL (ref 3.5–5.2)
ANION GAP SERPL CALCULATED.3IONS-SCNC: 13.6 MMOL/L (ref 5–15)
BUN SERPL-MCNC: 33 MG/DL (ref 8–23)
BUN/CREAT SERPL: 20.5 (ref 7–25)
CALCIUM SPEC-SCNC: 8.5 MG/DL (ref 8.6–10.5)
CHLORIDE SERPL-SCNC: 102 MMOL/L (ref 98–107)
CO2 SERPL-SCNC: 24.4 MMOL/L (ref 22–29)
CREAT SERPL-MCNC: 1.61 MG/DL (ref 0.76–1.27)
DEPRECATED RDW RBC AUTO: 46.7 FL (ref 37–54)
EGFRCR SERPLBLD CKD-EPI 2021: 41.4 ML/MIN/1.73
ERYTHROCYTE [DISTWIDTH] IN BLOOD BY AUTOMATED COUNT: 14.7 % (ref 12.3–15.4)
GLUCOSE BLDC GLUCOMTR-MCNC: 103 MG/DL (ref 70–130)
GLUCOSE SERPL-MCNC: 101 MG/DL (ref 65–99)
HCT VFR BLD AUTO: 30.3 % (ref 37.5–51)
HGB BLD-MCNC: 9.8 G/DL (ref 13–17.7)
MCH RBC QN AUTO: 28.4 PG (ref 26.6–33)
MCHC RBC AUTO-ENTMCNC: 32.3 G/DL (ref 31.5–35.7)
MCV RBC AUTO: 87.8 FL (ref 79–97)
PHOSPHATE SERPL-MCNC: 4.1 MG/DL (ref 2.5–4.5)
PLATELET # BLD AUTO: 331 10*3/MM3 (ref 140–450)
PMV BLD AUTO: 9 FL (ref 6–12)
POTASSIUM SERPL-SCNC: 3.8 MMOL/L (ref 3.5–5.2)
RBC # BLD AUTO: 3.45 10*6/MM3 (ref 4.14–5.8)
SODIUM SERPL-SCNC: 140 MMOL/L (ref 136–145)
WBC NRBC COR # BLD AUTO: 7.94 10*3/MM3 (ref 3.4–10.8)

## 2024-09-10 PROCEDURE — 63710000001 PREDNISONE PER 5 MG: Performed by: HOSPITALIST

## 2024-09-10 PROCEDURE — 25010000002 HEPARIN (PORCINE) PER 1000 UNITS: Performed by: STUDENT IN AN ORGANIZED HEALTH CARE EDUCATION/TRAINING PROGRAM

## 2024-09-10 PROCEDURE — 94664 DEMO&/EVAL PT USE INHALER: CPT

## 2024-09-10 PROCEDURE — 94799 UNLISTED PULMONARY SVC/PX: CPT

## 2024-09-10 PROCEDURE — 85027 COMPLETE CBC AUTOMATED: CPT | Performed by: HOSPITALIST

## 2024-09-10 PROCEDURE — 82948 REAGENT STRIP/BLOOD GLUCOSE: CPT

## 2024-09-10 PROCEDURE — 94761 N-INVAS EAR/PLS OXIMETRY MLT: CPT

## 2024-09-10 PROCEDURE — 80069 RENAL FUNCTION PANEL: CPT | Performed by: INTERNAL MEDICINE

## 2024-09-10 RX ORDER — POTASSIUM CHLORIDE 1500 MG/1
20 TABLET, EXTENDED RELEASE ORAL 2 TIMES DAILY WITH MEALS
Qty: 60 TABLET | Refills: 0 | Status: SHIPPED | OUTPATIENT
Start: 2024-09-10

## 2024-09-10 RX ADMIN — PREDNISONE 12.5 MG: 10 TABLET ORAL at 08:49

## 2024-09-10 RX ADMIN — DONEPEZIL HYDROCHLORIDE 10 MG: 10 TABLET, FILM COATED ORAL at 08:50

## 2024-09-10 RX ADMIN — TAMSULOSIN HYDROCHLORIDE 0.4 MG: 0.4 CAPSULE ORAL at 11:31

## 2024-09-10 RX ADMIN — HEPARIN SODIUM 5000 UNITS: 5000 INJECTION INTRAVENOUS; SUBCUTANEOUS at 06:11

## 2024-09-10 RX ADMIN — ASPIRIN 81 MG: 81 TABLET, COATED ORAL at 08:47

## 2024-09-10 RX ADMIN — POTASSIUM CHLORIDE 20 MEQ: 750 TABLET, EXTENDED RELEASE ORAL at 08:48

## 2024-09-10 RX ADMIN — TORSEMIDE 20 MG: 20 TABLET ORAL at 08:49

## 2024-09-10 RX ADMIN — Medication 10 ML: at 08:58

## 2024-09-10 RX ADMIN — GUAIFENESIN 600 MG: 600 TABLET, EXTENDED RELEASE ORAL at 08:48

## 2024-09-10 RX ADMIN — IPRATROPIUM BROMIDE AND ALBUTEROL SULFATE 3 ML: .5; 3 SOLUTION RESPIRATORY (INHALATION) at 07:19

## 2024-09-10 RX ADMIN — ATORVASTATIN CALCIUM 10 MG: 20 TABLET, FILM COATED ORAL at 08:50

## 2024-09-10 RX ADMIN — IPRATROPIUM BROMIDE AND ALBUTEROL SULFATE 3 ML: .5; 3 SOLUTION RESPIRATORY (INHALATION) at 11:21

## 2024-09-10 RX ADMIN — MEMANTINE HYDROCHLORIDE 10 MG: 10 TABLET, FILM COATED ORAL at 08:48

## 2024-09-10 RX ADMIN — FLUTICASONE PROPIONATE 2 SPRAY: 50 SPRAY, METERED NASAL at 08:57

## 2024-09-10 RX ADMIN — CARBIDOPA AND LEVODOPA 2 TABLET: 50; 200 TABLET, EXTENDED RELEASE ORAL at 08:50

## 2024-09-10 NOTE — CASE MANAGEMENT/SOCIAL WORK
Continued Stay Note  [facility]     Patient Name: Allan Kruger  MRN: [id number]  Today's Date: 9/10/2024    Admit Date: 9/2/2024    Plan: Home with HH, new DME, and family to transport.   Discharge Plan       Row Name 09/10/24 1333       Plan    Plan Home with HH, new DME, and family to transport.    Patient/Family in Agreement with Plan yes    Plan Comments Spoke with Coco Vanessa states that she has been in contact with caregiver and new DME scheduled to be delivered to home today. Spoke with patient's spouse and caregiver at bedside. Introduced self. Patient's discharge plan is to return home with HH and new DME through [employer] and caregiver to transport.                   Discharge Codes    No documentation.                 Expected Discharge Date and Time       Expected Discharge Date Expected Discharge Time    Sep 10, 2024               Nya Ellis RN

## 2024-09-10 NOTE — PLAN OF CARE
Problem: Adult Inpatient Plan of Care  Goal: Plan of Care Review  Outcome: Ongoing, Progressing  Flowsheets (Taken 9/10/2024 0445)  Progress: no change  Plan of Care Reviewed With: patient  Goal: Patient-Specific Goal (Individualized)  Outcome: Ongoing, Progressing  Goal: Absence of Hospital-Acquired Illness or Injury  Outcome: Ongoing, Progressing  Intervention: Identify and Manage Fall Risk  Recent Flowsheet Documentation  Taken 9/10/2024 0200 by Tanesha Mccullough RN  Safety Promotion/Fall Prevention:   safety round/check completed   room organization consistent   nonskid shoes/slippers when out of bed   lighting adjusted   fall prevention program maintained   clutter free environment maintained   assistive device/personal items within reach   activity supervised  Taken 9/10/2024 0000 by Tanesha Mccullough RN  Safety Promotion/Fall Prevention:   safety round/check completed   room organization consistent   nonskid shoes/slippers when out of bed   lighting adjusted   fall prevention program maintained   clutter free environment maintained   assistive device/personal items within reach   activity supervised  Taken 9/9/2024 2200 by Tanesha Mccullough RN  Safety Promotion/Fall Prevention:   safety round/check completed   room organization consistent   nonskid shoes/slippers when out of bed   lighting adjusted   fall prevention program maintained   clutter free environment maintained   assistive device/personal items within reach   activity supervised  Taken 9/9/2024 2000 by Tanesha Mccullough RN  Safety Promotion/Fall Prevention:   safety round/check completed   room organization consistent   nonskid shoes/slippers when out of bed   lighting adjusted   fall prevention program maintained   clutter free environment maintained   assistive device/personal items within reach   activity supervised  Intervention: Prevent Skin Injury  Recent Flowsheet Documentation  Taken 9/10/2024 0200 by Tanesha Mccullough RN  Body  Position: supine, legs elevated  Taken 9/10/2024 0000 by Tanesha Mccullough RN  Body Position: supine, legs elevated  Skin Protection:   adhesive use limited   transparent dressing maintained   incontinence pads utilized  Taken 9/9/2024 2200 by Tanesha Mccullough RN  Body Position: supine, legs elevated  Taken 9/9/2024 2000 by Tanesha Mccullough RN  Body Position: supine, legs elevated  Skin Protection:   adhesive use limited   incontinence pads utilized  Intervention: Prevent and Manage VTE (Venous Thromboembolism) Risk  Recent Flowsheet Documentation  Taken 9/10/2024 0000 by Tanesha Mccullough RN  Activity Management: activity encouraged  VTE Prevention/Management:   bilateral   sequential compression devices off   patient refused intervention  Range of Motion: active ROM (range of motion) encouraged  Taken 9/9/2024 2000 by Tanesha Mccullough RN  Activity Management: activity encouraged  VTE Prevention/Management:   bilateral   sequential compression devices off   patient refused intervention  Range of Motion: active ROM (range of motion) encouraged  Intervention: Prevent Infection  Recent Flowsheet Documentation  Taken 9/10/2024 0200 by Tanesha Mccullough RN  Infection Prevention:   hand hygiene promoted   personal protective equipment utilized   rest/sleep promoted   single patient room provided  Taken 9/10/2024 0000 by Tanesha Mccullough RN  Infection Prevention:   hand hygiene promoted   personal protective equipment utilized   rest/sleep promoted   single patient room provided  Taken 9/9/2024 2200 by Tanesha Mccullough RN  Infection Prevention:   hand hygiene promoted   personal protective equipment utilized   rest/sleep promoted   single patient room provided  Taken 9/9/2024 2000 by Tanesha Mccullough RN  Infection Prevention:   hand hygiene promoted   personal protective equipment utilized   rest/sleep promoted   single patient room provided  Goal: Optimal Comfort and Wellbeing  Outcome: Ongoing,  Progressing  Intervention: Provide Person-Centered Care  Recent Flowsheet Documentation  Taken 9/10/2024 0000 by Tanesha Mccullough RN  Trust Relationship/Rapport:   care explained   choices provided   emotional support provided   empathic listening provided   questions answered   questions encouraged   reassurance provided   thoughts/feelings acknowledged  Taken 9/9/2024 2000 by Tanesha Mccullough RN  Trust Relationship/Rapport:   care explained   choices provided   emotional support provided   thoughts/feelings acknowledged  Goal: Readiness for Transition of Care  Outcome: Ongoing, Progressing     Problem: Fall Injury Risk  Goal: Absence of Fall and Fall-Related Injury  Outcome: Ongoing, Progressing  Intervention: Identify and Manage Contributors  Recent Flowsheet Documentation  Taken 9/10/2024 0200 by Tanesha Mccullough RN  Medication Review/Management: medications reviewed  Taken 9/10/2024 0000 by Tanesha Mccullough RN  Medication Review/Management: medications reviewed  Self-Care Promotion: independence encouraged  Taken 9/9/2024 2200 by Tanesha Mccullough RN  Medication Review/Management: medications reviewed  Taken 9/9/2024 2000 by Tanesha Mccullough RN  Medication Review/Management: medications reviewed  Intervention: Promote Injury-Free Environment  Recent Flowsheet Documentation  Taken 9/10/2024 0200 by Tanesha Mccullough RN  Safety Promotion/Fall Prevention:   safety round/check completed   room organization consistent   nonskid shoes/slippers when out of bed   lighting adjusted   fall prevention program maintained   clutter free environment maintained   assistive device/personal items within reach   activity supervised  Taken 9/10/2024 0000 by Tanesha Mccullough RN  Safety Promotion/Fall Prevention:   safety round/check completed   room organization consistent   nonskid shoes/slippers when out of bed   lighting adjusted   fall prevention program maintained   clutter free environment maintained    assistive device/personal items within reach   activity supervised  Taken 9/9/2024 2200 by Tanesha Mccullough RN  Safety Promotion/Fall Prevention:   safety round/check completed   room organization consistent   nonskid shoes/slippers when out of bed   lighting adjusted   fall prevention program maintained   clutter free environment maintained   assistive device/personal items within reach   activity supervised  Taken 9/9/2024 2000 by Tanesha Mccullough RN  Safety Promotion/Fall Prevention:   safety round/check completed   room organization consistent   nonskid shoes/slippers when out of bed   lighting adjusted   fall prevention program maintained   clutter free environment maintained   assistive device/personal items within reach   activity supervised     Problem: Skin Injury Risk Increased  Goal: Skin Health and Integrity  Outcome: Ongoing, Progressing  Intervention: Optimize Skin Protection  Recent Flowsheet Documentation  Taken 9/10/2024 0200 by Tanesha Mccullough RN  Head of Bed (HOB) Positioning: HOB elevated  Taken 9/10/2024 0000 by Tanesha Mccullough RN  Pressure Reduction Techniques:   frequent weight shift encouraged   weight shift assistance provided  Head of Bed (HOB) Positioning: HOB elevated  Skin Protection:   adhesive use limited   transparent dressing maintained   incontinence pads utilized  Taken 9/9/2024 2200 by Tanesha Mccullough RN  Head of Bed (HOB) Positioning: HOB elevated  Taken 9/9/2024 2000 by Tanesha Mccullough RN  Pressure Reduction Techniques:   frequent weight shift encouraged   weight shift assistance provided  Head of Bed (HOB) Positioning: HOB elevated  Skin Protection:   adhesive use limited   incontinence pads utilized   Goal Outcome Evaluation:  Plan of Care Reviewed With: patient        Progress: no change     VSS on room air. Pt removed from monitoring for Palliative. Baseline mentation. No acute changes overnight. Will continue to monitor throughout the remainder of the  shift.

## 2024-09-10 NOTE — PLAN OF CARE
Goal Outcome Evaluation:    This RN went to apply dressing before Patient was discharged. Patients caregiver and patient wife stated that they would change it at home. Patient caregiver reported being educated on dressing changes and that they preferred to do it themselves at home. This RN educated in importance of dressing. Patient caregiver insisted on doing dressing at home.

## 2024-09-10 NOTE — DISCHARGE SUMMARY
Patient Name: Allan Kruger  : 1940  MRN: [id number]    Date of Admission: 2024  Date of Discharge:  9/10/2024  Primary Care Physician: Harshakumar, Sreedevi Pallath, MD      Chief Complaint:   Altered Mental Status      Discharge Diagnoses     Active Hospital Problems    Diagnosis  POA    **Sepsis [A41.9]  Yes    PMR (polymyalgia rheumatica) [M35.3]  Yes    Current chronic use of systemic steroids [Z79.52]  Not Applicable    Diarrhea [R19.7]  Yes    JASS (acute kidney injury) [N17.9]  Yes    Stage 3b chronic kidney disease [N18.32]  Yes    Dementia without behavioral disturbance [F03.90]  Yes    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        Hospital Course     Mr. Kruger is a 84 y.o. male with a history of polymyalgia rheumatica, chronic kidney disease stage IIIb, dementia, rheumatoid arthritis, parkinsonism and pulmonary fibrosis who presented to [facility] initially complaining of altered mental status.  Please see the admitting history and physical for further details.  He was found to have leukocytosis altered mental status and unclear source of infection and was admitted to the hospital for further evaluation and treatment.  He was made to the hospital initially started on broad-spectrum antibiotics.  Ultimately was decided he might have cellulitis related to the left lower extremity foot wound.  He was treated with antibiotics and did recover and improve.  He was followed by nephrology and pulmonology here for his underlying comorbid illnesses.  His renal function is stabilized and improved.  He is responding well to torsemide and plan will be to follow-up with nephrology in the outpatient setting for ongoing care and management.  His white blood cell count has normalized but at the day of discharge they are  frustrated they did not see an infectious disease doctor during the course of their stay.  I have placed an outpatient referral.  He did have an MRI done here that did not show any evidence of a deeper infection and plan is to continue outpatient wound care for this wound.  Unfortunately I do think that this will likely be difficult to fully heal and even if they do manage to offload it given his bilateral ankle deformities I imagine he has significant pressure injuries at times.  He probably needs to follow-up with a podiatrist for a more comprehensive care plan.  At this point he has been in the hospital 8 days and was on broad-spectrum antibiotics had acute on chronic renal failure.  He has chronic conditions that are unlikely to completely improve or resolve.  From discussion with his wife his dementia has been progressive.  They recently moved here about a year ago.  She mentions that he has been becoming increasingly nonverbal.  He still eating fairly well at times.  But overall his dementia is continually progressive.  My suspicion is that this hospitalization may take him down further through that progression.  I had a fairly lengthy conversation on the day prior to discharge.  I do not think that I would add any sort of benefit to his overall quality of life performing cardiopulmonary resuscitation on this gentleman.  Throughout our conversation his wife mentioned multiple times that she wants to try to keep his quality of life as good as she can for as long as she can.  I mentioned that doing this would likely make it very difficult to keep him at home admit the likelihood that he would ever return to his prior level of function is nearly nonexistent.  Following that conversation it was expressed to the nursing staff that she was not really happy with our discussion and was not really happy with some of the things that I had to say.  I do recommend that this conversation be continued in the outpatient  setting with both his primary care physician and subspecialty physicians.  He has follow-up arranged with pulmonology and nephrology and wound care.  Outpatient infectious disease consultation has been requested.  At this point he is reached maximal benefit of inpatient hospitalization and is stable to discharge home.  He has 24-hour caregivers at home and home health has been ordered for assistance.    Day of Discharge     Subjective:  He is awake and alert this morning.  He has been up and walked to the bathroom and back in bed.  Both his wife and caregiver at the bedside    Physical Exam:  Temp:  [97.6 °F (36.4 °C)-98.5 °F (36.9 °C)] 97.9 °F (36.6 °C)  Heart Rate:  [66-74] 67  Resp:  [16-20] 20  BP: (137-158)/(64-76) 146/76  Body mass index is 31.85 kg/m².  Physical Exam  Vitals and nursing note reviewed.   Constitutional:       Appearance: He is ill-appearing.   HENT:      Head: Normocephalic and atraumatic.   Cardiovascular:      Rate and Rhythm: Normal rate and regular rhythm.   Pulmonary:      Effort: No respiratory distress.      Breath sounds: Normal breath sounds.   Neurological:      General: No focal deficit present.      Mental Status: He is alert. Mental status is at baseline.         Consultants     Consult Orders (all) (From admission, onward)       Start     Ordered    09/09/24 0811  Inpatient Palliative Care Team Consult  Once        Provider:  (Not yet assigned)    09/09/24 0810    09/08/24 1132  Inpatient Case Management  Consult  Once        Provider:  (Not yet assigned)    09/08/24 1133    09/06/24 1348  Inpatient Pulmonology Consult  Once        Specialty:  Pulmonary Disease  Provider:  Khadar Zamora MD    09/06/24 1347    09/03/24 0702  Inpatient Nephrology Consult  IN AM        Specialty:  Nephrology  Provider:  Que Ruvalcaba MD    09/03/24 0011    09/02/24 2320  IP Palliative Care Nurse Consult  Once        Provider:  (Not yet assigned)    09/02/24 4450     09/02/24 2251  [facility] (on-call MD unless specified) Details  Once        Specialty:  Hospitalist  Provider:  (Not yet assigned)    09/02/24 2250                  Procedures     * Surgery not found *    Imaging Results (All)       Procedure Component Value Units Date/Time    CT Chest Without Contrast Diagnostic [[id number]] Collected: 09/06/24 1507     Updated: 09/06/24 1517    Narrative:      CT CHEST WITHOUT CONTRAST     HISTORY: Worsening cough     TECHNIQUE: Radiation dose reduction techniques were utilized, including  automated exposure control and exposure modulation based on body size.  CT scan of the chest without the administration of IV contrast was  performed. Coronal and sagittal reformatted images obtained.     COMPARISON: CT of the abdomen pelvis 9/3/2024, chest x-ray 9/2/2024     FINDINGS: There are some patchy groundglass infiltrates present in the  lung apices in both upper lobes. There is peripheral and basilar  predominant interstitial septal thickening and groundglass opacity  associated with some areas of bronchiectasis. Small area of secretions  seen within the bronchus intermedius. There are mildly enlarged  mediastinal lymph nodes favored to be reactive given the findings in the  lungs. Trace pleural effusions are seen bilaterally. Coronary artery  calcifications are present. Limited imaging of the upper abdomen  demonstrate the possibility of ductal dilation or cystic lesion in the  pancreatic tail associated with some calcifications. This is  indeterminate however without contrast. Suggest evaluation with MRI  abdomen with and without contrast with MRCP sequences or CT of the  abdomen with contrast pancreas protocol. No acute bony abnormality.  Degenerative changes thoracic spine and shoulders       Impression:      1. Patchy groundglass infiltrates in the lung apices and upper lobe are  favored to be infectious/inflammatory or edema  2. Peripheral and basilar predominant interstitial septal  thickening  with groundglass opacity associated with areas of bronchiectasis. These  findings are indeterminate. Areas of chronic fibrotic change are  suggested given the bronchiectasis however acute superimposed  infectious/inflammatory process or edema may also be present. Follow-up  recommended to evaluate stability/clearing  3. Possible ductal dilation or cystic lesion involving the pancreatic  tail associated with some calcifications, indeterminate without  contrast. Suggest evaluation with MRI abdomen or CT abdomen as described  above        Radiation dose reduction techniques were utilized, including automated  exposure control and exposure modulation based on body size.        This report was finalized on 9/6/2024 3:14 PM by Dr. Juan A Jay M.D  on Workstation: [id number]       MRI Foot Left Without Contrast [[id number]] Collected: 09/06/24 0728     Updated: 09/06/24 0750    Narrative:      MRI FOOT LEFT WO CONTRAST-     Date of Exam: 9/5/2024 8:25 PM     Indication: Chronic wound on the left foot, evaluate for deep infection.  Redness, swelling, and open wound at the left forefoot.     Comparison: None available.     Technique: Multiplanar, multisequence MR imaging of the midfoot and  forefoot was performed without the intravenous administration of  contrast.     FINDINGS:  SOFT TISSUES: A marker has been placed at the medial and plantar  junction of the midfoot and forefoot, indicating the area of clinical  concern. Deep to the marker, there is a focal open soft tissue wound  extending through the subcutaneous fat in the medial plantar fascia to  the superficial underlying musculature. Diffuse soft tissue edema,  greatest at the dorsum of the forefoot. No well-formed or drainable  fluid collection definitive for abscess. No cystic or solid soft tissue  mass. The intermetatarsal spaces are unremarkable.     BONES: Multifocal likely degenerative subchondral cystic changes across  the first and second  TMT joints and the great toe MTP joint. Large  osteophyte formation at the dorsal distal medial cuneiform and the  dorsal base of the great toe metatarsal. No acute fracture. No  concerning bone marrow lesion or marrow replacing process.     JOINTS: No significant joint effusion. No discrete intra-articular body.  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 Moderate to severe chondromalacia/DJD at the  great toe MTP joint. The midfoot is well aligned.     LIGAMENTS: The Lisfranc ligament complex is intact. The MTP collateral  ligaments are intact.     MUSCLES AND TENDONS: The flexor and extensor tendons are intact.  The  plantar plates are grossly intact. Severe diffuse muscular fatty  atrophy, likely neuropathic, with mild diffuse muscular edema that could  also be neuropathic or could reflect myositis.       Impression:         1. Focal wound at the medial and plantar junction of the midfoot and  forefoot extending through the subcutaneous fat and with focal extension  through the medial plantar fascia to the superficial aspect of the right  thigh musculature. Diffuse soft tissue edema with no well-formed or  drainable fluid collection to suggest abscess.  2. No evidence of osteomyelitis.  3. 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  4. Moderate to severe chondromalacia/DJD at the great toe MTP joint.  5. Severe diffuse muscular fatty atrophy, likely neuropathic, with mild  diffuse muscular edema that could also be neuropathic or could represent  myositis.              This report was finalized on 9/6/2024 7:47 AM by Silvestre Mejia MD on  Workstation: [id number]       CT Abdomen Pelvis Without Contrast [[id number]] Collected: 09/03/24 0055     Updated: 09/03/24 0106    Narrative:      CT OF THE ABDOMEN PELVIS WITHOUT CONTRAST     HISTORY: Sepsis     COMPARISON: None available     TECHNIQUE: Axial CT imaging was  obtained through the abdomen and pelvis.  No IV contrast was administered.     FINDINGS:  Images through the lung bases demonstrate chronic fibrotic changes,  there is some more focal airspace consolidation noted at both lung  bases. I'm uncertain if this is further chronic scarring, or a  superimposed infiltrate, in the absence of any prior studies for  comparison. There are coronary artery calcifications. There are  dystrophic calcifications of the aortic valve annulus. No suspicious  hepatic lesions are seen. The gallbladder, adrenal glands, and spleen  are normal. There are some pancreatic parenchymal calcifications, which  may reflect changes of chronic pancreatitis. Stomach and duodenum appear  mildly distended and fluid-filled. Right kidney is absent. No  hydronephrosis is seen on the left. There is a simple appearing left  renal cyst. No additional follow-up is necessary. There are extensive  aortoiliac calcifications. Urinary bladder is thick walled, and  correlation with urinalysis and urine cultures is recommended. Prostate  gland contains dystrophic calcifications. Rectal vault is distended with  stool, suggesting impaction. There is colonic diverticulosis. There is  no bowel obstruction. There is no evidence of appendicitis. There is a  fat-containing umbilical hernia. There is some perinephric and  periureteral stranding on the left, which may reflect ascending urinary  tract infection. No acute osseous abnormalities are seen. There is  lumbar scoliosis, with convexity to the left.       Impression:         1.The patient's stomach and duodenum appear somewhat patulous, which may  reflect gastroenteritis.  2. Fibrotic changes are noted at the lung bases bilaterally. There are  patchy areas of airspace consolidation noted at the lung bases. It is  impossible to know if the patient has any superimposed infiltrates, in  the absence of any prior studies for comparison.  3. Increased stool burden within the  rectal vault, suggesting fecal  impaction. There is no evidence of proctitis.  4. Walled appearance to the urinary bladder, along with some perinephric  and periureteral stranding on the left, which may reflect cystitis and  ascending urinary tract infection.     Radiation dose reduction techniques were utilized, including automated  exposure control and exposure modulation based on body size.        This report was finalized on 9/3/2024 1:03 AM by Dr. Jessie Oviedo M.D on Workstation: [id number]       XR Chest 1 View [[id number]] Collected: 09/02/24 2357     Updated: 09/03/24 0002    Narrative:      SINGLE VIEW OF THE CHEST     HISTORY: Sepsis     COMPARISON: None available.     FINDINGS:  There is cardiomegaly. Lung volumes appear diminished, with diffuse  interstitial prominence. Appearance suggests underlying chronic  interstitial lung disease and pulmonary fibrosis. Prior report did  describe chronic interstitial changes. Cannot exclude infiltrate within  the left lung, in the absence of any prior studies for comparison. No  pneumothorax or large effusion is seen. There are advanced degenerative  changes of the left shoulder.       Impression:      Overall diminished lung volumes, with diffuse interstitial prominence,  suggesting underlying chronic interstitial lung disease and pulmonary  fibrosis. I cannot exclude some infiltrate at the left lung base, in the  absence of any prior studies for comparison.     This report was finalized on 9/2/2024 11:59 PM by Dr. Jessie Oviedo M.D on Workstation: [id number]       CT Head Without Contrast [[id number]] Collected: 09/02/24 2238     Updated: 09/02/24 2250    Narrative:      CT OF THE HEAD WITHOUT CONTRAST     HISTORY: Altered mental status     COMPARISON: None available.     TECHNIQUE: Axial CT imaging was obtained through the brain. No IV  contrast was administered.     FINDINGS:  No acute intracranial hemorrhage is seen. There is diffuse  atrophy.  There is periventricular and deep white matter pathic change. There is  no midline shift or mass effect. There is mucosal thickening noted  within the ethmoid and maxillary sinuses. Mastoid air cells are clear.          Impression:      No acute intracranial findings.     Radiation dose reduction techniques were utilized, including automated  exposure control and exposure modulation based on body size.        This report was finalized on 9/2/2024 10:47 PM by Dr. Jessie Oviedo M.D on Workstation: [id number]             Results for orders placed during the hospital encounter of 09/02/24    Duplex Venous Lower Extremity - Bilateral CAR    Interpretation Summary    Chronic right lower extremity superficial thrombophlebitis noted in the small saphenous and varicosity (below knee).    Chronic left lower extremity superficial thrombophlebitis noted in the small saphenous.    All other veins appeared normal bilaterally.    Results for orders placed during the hospital encounter of 09/02/24    Adult Transthoracic Echo Complete W/ Cont if Necessary Per Protocol    Interpretation Summary    Left ventricular systolic function is normal. Calculated left ventricular EF = 60.6%    Left ventricular wall thickness is consistent with mild concentric hypertrophy.    Left ventricular diastolic function is consistent with age.    Moderate aortic valve stenosis is present. Aortic valve area is 1.5 cm2.    Peak velocity of the flow distal to the aortic valve is 380.2 cm/s. Aortic valve maximum pressure gradient is 58 mmHg. Aortic valve mean pressure gradient is 30 mmHg. Aortic valve dimensionless index is 0.4 .    Pertinent Labs     Results from last 7 days   Lab Units 09/10/24  0409 09/09/24  0549 09/08/24  0433 09/07/24  0429   WBC 10*3/mm3 7.94 7.96 7.71 7.59   HEMOGLOBIN g/dL 9.8* 9.8* 9.9* 9.2*   PLATELETS 10*3/mm3 331 301 288 231     Results from last 7 days   Lab Units 09/10/24  0409 09/09/24  0549 09/08/24  0433  "09/07/24  0429   SODIUM mmol/L 140 138 140 144   POTASSIUM mmol/L 3.8 3.8 3.6 3.8   CHLORIDE mmol/L 102 102 102 110*   CO2 mmol/L 24.4 22.9 25.0 21.3*   BUN mg/dL 33* 29* 30* 27*   CREATININE mg/dL 1.61* 1.64* 1.63* 1.50*   GLUCOSE mg/dL 101* 106* 99 99   EGFR mL/min/1.73 41.4* 40.5* 40.8* 45.1*     Results from last 7 days   Lab Units 09/10/24  0409 09/08/24  0433 09/07/24  0429 09/06/24  0557   ALBUMIN g/dL 3.2* 3.5 3.1* 3.1*   BILIRUBIN mg/dL  --   --  0.3  --    ALK PHOS U/L  --   --  73  --    AST (SGOT) U/L  --   --  16  --    ALT (SGPT) U/L  --   --  13  --      Results from last 7 days   Lab Units 09/10/24  0409 09/09/24  0549 09/08/24  0433 09/07/24  0429 09/06/24  0557 09/05/24  0427   CALCIUM mg/dL 8.5* 8.6 8.8 8.4* 8.2* 8.3*   ALBUMIN g/dL 3.2*  --  3.5 3.1* 3.1* 3.2*   MAGNESIUM mg/dL  --   --  2.1  --  2.0 2.1   PHOSPHORUS mg/dL 4.1  --  3.2  --  2.6 3.2       Results from last 7 days   Lab Units 09/06/24  0557   PROBNP pg/mL 7,540.0*     Results from last 7 days   Lab Units 09/06/24  0557   URIC ACID mg/dL 8.2*         Invalid input(s): \"LDLCALC\"  Results from last 7 days   Lab Units 09/03/24  1854   MRSAPCR  No MRSA Detected     Results from last 7 days   Lab Units 09/06/24  1131   COVID19  Not Detected       Test Results Pending at Discharge       Discharge Details        Discharge Medications        New Medications        Instructions Start Date   potassium chloride 20 MEQ CR tablet  Commonly known as: KLOR-CON M20   20 mEq, Oral, Daily      potassium chloride 20 MEQ CR tablet  Commonly known as: KLOR-CON M20   20 mEq, Oral, 2 Times Daily With Meals      torsemide 20 MG tablet  Commonly known as: DEMADEX   20 mg, Oral, Daily             Continue These Medications        Instructions Start Date   aspirin 81 MG EC tablet   81 mg, Oral, Daily      atorvastatin 10 MG tablet  Commonly known as: LIPITOR   10 mg, Oral, Daily      carbidopa-levodopa CR  MG per CR tablet  Commonly known as: SINEMET " CR   2 tablets, Oral, Daily      cholecalciferol 1.25 MG (82266 UT) capsule  Commonly known as: VITAMIN D3   5,000 Units, Oral, Daily      docusate sodium 100 MG capsule  Commonly known as: COLACE   100 mg, Oral, Daily      donepezil 10 MG tablet  Commonly known as: ARICEPT   10 mg, Oral, Daily      fluticasone 50 MCG/ACT nasal spray  Commonly known as: FLONASE   2 sprays, Nasal, Daily      guaiFENesin 600 MG 12 hr tablet  Commonly known as: MUCINEX   600 mg, Oral, 2 Times Daily      icosapent ethyl 1 g capsule capsule  Commonly known as: VASCEPA   2 g, Oral, Daily      memantine 10 MG tablet  Commonly known as: NAMENDA   10 mg, Oral, Daily      modafinil 100 MG tablet  Commonly known as: PROVIGIL   400 mg, Oral, Daily      predniSONE 5 MG tablet  Commonly known as: DELTASONE   5 mg, Oral, Daily, Take 12.5 MG daily      probenecid 500 MG tablet  Commonly known as: BENEMID   500 mg, Oral, 2 Times Daily      tamsulosin 0.4 MG capsule 24 hr capsule  Commonly known as: FLOMAX   1 capsule, Oral, 2 Times Daily               Allergies   Allergen Reactions    Augmentin [Amoxicillin-Pot Clavulanate] Rash    Neosporin [Bacitracin-Polymyxin B] Rash       Discharge Disposition:  Home-Health Care Sv      Discharge Diet:  Diet Order   Procedures    Diet: Cardiac; Healthy Heart (2-3 Na+); Fluid Consistency: Thin (IDDSI 0)       Discharge Activity:   Activity Instructions       Activity as Tolerated              CODE STATUS:    Code Status and Medical Interventions: CPR (Attempt to Resuscitate); Full Support   Ordered at: 09/03/24 0012     Code Status (Patient has no pulse and is not breathing):    CPR (Attempt to Resuscitate)     Medical Interventions (Patient has pulse or is breathing):    Full Support       No future appointments.  Additional Instructions for the Follow-ups that You Need to Schedule       Ambulatory Referral to Home Health (Hospital)   As directed      Face to Face Visit Date: 9/10/2024   Follow-up provider for  Plan of Care?: I treated the patient in an acute care facility and will not continue treatment after discharge.   Follow-up provider: HARSHAKUMAR, SREEDEVI PALLATH [[id number]]   Reason/Clinical Findings: Patient below baseline ADL   Describe mobility limitations that make leaving home difficult: Patient with dementia, limited mobility   Nursing/Therapeutic Services Requested: Physical Therapy Occupational Therapy   PT orders: Strengthening Home safety assessment   Occupational orders: Activities of daily living Home safety assessment   Frequency: 1 Week 1        Discharge Follow-up with PCP   As directed       Currently Documented PCP:    Provider, No Known    PCP Phone Number:    [phone]     Follow Up Details: 1-2 weeks        Discharge Follow-up with Specified Provider: rheumatology; 2 Weeks   As directed      To: rheumatology   Follow Up: 2 Weeks               Follow-up Information       Kar Silva MD Follow up in 1 month(s).    Specialties: Pulmonary Disease, Intensive Care  Why: Within 4 to 6 weeks with full PFT and chest x-ray in our office  Contact information:  [address] [id number] [phone]               Provider, No Known .    Why: 1-2 weeks  Contact information:  [facility] [id number] [phone]               Harshakumar, Sreedevi Pallath, MD .    Specialty: Internal Medicine  Contact information:  [address]   [address] [place], [place] [id number] [phone]                             Additional Instructions for the Follow-ups that You Need to Schedule       Ambulatory Referral to Home Health ([facility])   As directed      Face to Face Visit Date: 9/10/2024   Follow-up provider for Plan of Care?: I treated the patient in an acute care facility and will not continue treatment after discharge.   Follow-up provider: HARSHAKUMAR, SREEDEVI PALLATH [[id number]]   Reason/Clinical Findings: Patient below baseline ADL   Describe mobility limitations  that make leaving home difficult: Patient with dementia, limited mobility   Nursing/Therapeutic Services Requested: Physical Therapy Occupational Therapy   PT orders: Strengthening Home safety assessment   Occupational orders: Activities of daily living Home safety assessment   Frequency: 1 Week 1        Discharge Follow-up with PCP   As directed       Currently Documented PCP:    Provider, No Known    PCP Phone Number:    [phone]     Follow Up Details: 1-2 weeks        Discharge Follow-up with Specified Provider: rheumatology; 2 Weeks   As directed      To: rheumatology   Follow Up: 2 Weeks            Time Spent on Discharge:  Greater than 30 minutes      Frankie Barrett MD  [facility]  09/10/24  11:53 EDT

## 2024-09-10 NOTE — CASE MANAGEMENT/SOCIAL WORK
Case Management Discharge Note      Final Note: Home, caregiver to transport. HH ordered.    Provided Post Acute Provider List?: N/A  N/A Provider List Comment: Denies need  Provided Post Acute Provider Quality & Resource List?: N/A  N/A Quality & Resource List Comment: Denies need    Selected Continued Care - Discharged on 9/10/2024 Admission date: 9/2/2024 - Discharge disposition: Home-Health Care Svc      Destination    No services have been selected for the patient.                Durable Medical Equipment    No services have been selected for the patient.                Dialysis/Infusion    No services have been selected for the patient.                Home Medical Care    No services have been selected for the patient.                Therapy    No services have been selected for the patient.                Community Resources    No services have been selected for the patient.                Community & DME    No services have been selected for the patient.                         Final Discharge Disposition Code: 06 - home with home health care

## 2024-09-10 NOTE — PROGRESS NOTES
[facility] to provide Home Care services. Patient agreeable and denies other HH at this time. Wife, Yaa is a [facility] current patient. PCP and contact information confirmed. Please call Caregiver Benita Mckeon to schedule HH visits, [phone].

## 2024-09-11 ENCOUNTER — HOME CARE VISIT (OUTPATIENT)
Dept: HOME HEALTH SERVICES | Facility: HOME HEALTHCARE | Age: 84
End: 2024-09-11
Payer: COMMERCIAL

## 2024-09-11 VITALS — WEIGHT: 240 LBS | BODY MASS INDEX: 30.8 KG/M2 | HEIGHT: 74 IN

## 2024-09-11 PROCEDURE — G0151 HHCP-SERV OF PT,EA 15 MIN: HCPCS

## 2024-09-11 NOTE — OUTREACH NOTE
Prep Survey      Flowsheet Row Responses   [facility] facility patient discharged from? [facility]   Is LACE score < 7 ? No   Eligibility Readm Mgmt   Discharge diagnosis sepsis   Does the patient have one of the following disease processes/diagnoses(primary or secondary)? Sepsis   Does the patient have Home health ordered? Yes   What is the Home health agency?  HH ordered   Is there a DME ordered? Yes   What DME was ordered? hospital bed, [facility]   Prep survey completed? Yes            Terri Miranda Registered Nurse

## 2024-09-12 ENCOUNTER — HOME CARE VISIT (OUTPATIENT)
Dept: HOME HEALTH SERVICES | Facility: HOME HEALTHCARE | Age: 84
End: 2024-09-12
Payer: COMMERCIAL

## 2024-09-12 VITALS
OXYGEN SATURATION: 92 % | HEART RATE: 98 BPM | DIASTOLIC BLOOD PRESSURE: 79 MMHG | TEMPERATURE: 98.9 F | SYSTOLIC BLOOD PRESSURE: 128 MMHG

## 2024-09-12 PROCEDURE — G0152 HHCP-SERV OF OT,EA 15 MIN: HCPCS

## 2024-09-12 NOTE — HOME HEALTH
SOC Note:     Home Health ordered for: PT,OT    Reason for Hosp/Primary Dx/Co-morbidities: sepsis, polymyalgia rheumatica    Focus of Care: functional decline from hospitalization 9/2-9/8 for sepsis, polymyalgia rheumatica    Patient's goal(s): get better    Current Functional status/mobility/DME:     HB status/Living Arrangements: spouse, caregivers in 2 story home    Skin Integrity/wound status: intact    Code Status: full    Fall Risk/Safety concerns: high fall risk    Educated on Emergency Plan, steps to take prior to going to the ER and when to Call Home Health First:  yes     Medication issues/Concerns: none    Additional Problems/Concerns: none    SDOH Barriers (i.e. caregiver concerns, social isolation, transportation, food insecurity, environment, income etc.)/Need for MSW: none

## 2024-09-12 NOTE — HOME HEALTH
REASON FOR REFERRAL: Pt is an 83 y/o male who was admitted to [facility] from 9/2/24 through 9/10/24 for leukocytosis altered mental status and unclear source of infection. Per hosital report, source was believe to be from cellulitis secondary to LLE foot wound. Pt's kidneys were address through [facility] and outpatient referral was made for infectious disease.  PMHx: polymyalgia rheumatica, chronic kidney disease stage IIIb, dementia, rheumatoid arthritis, parkinsonism and pulmonary fibrosis   OT's FOCUS OF CARE: endurance for ADLs  SUBJECTIVE: Pt nodding and shaking his head today. Denies pain but presenting SOB with minimal activity requiring significant recovery periods.  SOCIAL & ENVIRONMENTAL SITUATION: Pt lives in a multistory home with his spouse and 24 hr care. Caregiver is knowledgeable and she is a UBTT. Pt has a RW, shower chair,  shower head, grab bars, and frame over commode to increase ease and I with ADL routine. Pt was previously able to walk all over his home and took routine walks down to his mailbox, however since hospitalization, pt has declined significantly in activity tolerance. O2 sat WFL, however pt unable to walk to his bathroom without SOB and a rest break. Pt's significant increase in fatigue also affects his safety as he rushes to make it to his target and is at high risk to stumble his gait.  PATIENT'S &/OR CAREGIVER'S GOAL: Caregiver/spouse wish for pt to build activity tolerance. Although caregiver is knowledagable, pt responds better to  therapist.  INTERVENTIONS: energy conservation strategies, PLB, activity tolerance training  ASSESSMENT: Pt would benefit from skilled OT services to progress BUE strength and activity tolerance in order to reduce RPE with ADL routine significantly reducing risk of falls and resulting injury.  MEDICAL NECESSITY: Skilled OT services will progress patient I by establishing safe routines including functional t/fs, functional mobility within the home,  strength, activity tolerance, pt/caregiver education, and AD/AE/DME recommendations. Skilled OT services are indicated in order to reduce the patient's BOC and improve overall I so that patient may safely age in place.  PLAN: OT to tx 2W3  PLAN FOR NEXT VISIT: HEP for BUE and activity tolerance training

## 2024-09-12 NOTE — Clinical Note
OT to tx 2W3 addressing activity tolerance, safety, and energy conservation as appropriate to improve safety with ADLs.

## 2024-09-17 ENCOUNTER — HOME CARE VISIT (OUTPATIENT)
Dept: HOME HEALTH SERVICES | Facility: HOME HEALTHCARE | Age: 84
End: 2024-09-17
Payer: COMMERCIAL

## 2024-09-17 VITALS
SYSTOLIC BLOOD PRESSURE: 122 MMHG | RESPIRATION RATE: 18 BRPM | HEART RATE: 74 BPM | OXYGEN SATURATION: 94 % | TEMPERATURE: 97 F | DIASTOLIC BLOOD PRESSURE: 64 MMHG

## 2024-09-17 PROCEDURE — G0151 HHCP-SERV OF PT,EA 15 MIN: HCPCS

## 2024-09-18 ENCOUNTER — HOME CARE VISIT (OUTPATIENT)
Dept: HOME HEALTH SERVICES | Facility: HOME HEALTHCARE | Age: 84
End: 2024-09-18
Payer: COMMERCIAL

## 2024-09-18 VITALS
HEART RATE: 68 BPM | TEMPERATURE: 97.3 F | DIASTOLIC BLOOD PRESSURE: 63 MMHG | SYSTOLIC BLOOD PRESSURE: 116 MMHG | OXYGEN SATURATION: 94 %

## 2024-09-18 PROCEDURE — G0152 HHCP-SERV OF OT,EA 15 MIN: HCPCS

## 2024-09-19 ENCOUNTER — HOME CARE VISIT (OUTPATIENT)
Dept: HOME HEALTH SERVICES | Facility: HOME HEALTHCARE | Age: 84
End: 2024-09-19
Payer: COMMERCIAL

## 2024-09-19 ENCOUNTER — READMISSION MANAGEMENT (OUTPATIENT)
Dept: CALL CENTER | Facility: HOSPITAL | Age: 84
End: 2024-09-19
Payer: MEDICARE

## 2024-09-19 VITALS
SYSTOLIC BLOOD PRESSURE: 112 MMHG | OXYGEN SATURATION: 94 % | DIASTOLIC BLOOD PRESSURE: 64 MMHG | RESPIRATION RATE: 16 BRPM | TEMPERATURE: 96.8 F | HEART RATE: 73 BPM

## 2024-09-19 PROCEDURE — G0151 HHCP-SERV OF PT,EA 15 MIN: HCPCS

## 2024-09-20 ENCOUNTER — HOME CARE VISIT (OUTPATIENT)
Dept: HOME HEALTH SERVICES | Facility: HOME HEALTHCARE | Age: 84
End: 2024-09-20
Payer: COMMERCIAL

## 2024-09-20 VITALS
HEART RATE: 78 BPM | TEMPERATURE: 97.1 F | DIASTOLIC BLOOD PRESSURE: 98 MMHG | OXYGEN SATURATION: 95 % | SYSTOLIC BLOOD PRESSURE: 131 MMHG

## 2024-09-20 PROCEDURE — G0152 HHCP-SERV OF OT,EA 15 MIN: HCPCS

## 2024-09-23 ENCOUNTER — HOME CARE VISIT (OUTPATIENT)
Dept: HOME HEALTH SERVICES | Facility: HOME HEALTHCARE | Age: 84
End: 2024-09-23
Payer: COMMERCIAL

## 2024-09-23 VITALS
HEART RATE: 61 BPM | TEMPERATURE: 96.8 F | OXYGEN SATURATION: 98 % | RESPIRATION RATE: 17 BRPM | DIASTOLIC BLOOD PRESSURE: 68 MMHG | SYSTOLIC BLOOD PRESSURE: 116 MMHG

## 2024-09-23 PROCEDURE — G0151 HHCP-SERV OF PT,EA 15 MIN: HCPCS

## 2024-09-24 ENCOUNTER — READMISSION MANAGEMENT (OUTPATIENT)
Dept: CALL CENTER | Facility: HOSPITAL | Age: 84
End: 2024-09-24
Payer: MEDICARE

## 2024-09-26 ENCOUNTER — APPOINTMENT (OUTPATIENT)
Dept: GENERAL RADIOLOGY | Facility: HOSPITAL | Age: 84
End: 2024-09-26
Payer: MEDICARE

## 2024-09-26 ENCOUNTER — APPOINTMENT (OUTPATIENT)
Dept: CT IMAGING | Facility: HOSPITAL | Age: 84
End: 2024-09-26
Payer: MEDICARE

## 2024-09-26 ENCOUNTER — HOME CARE VISIT (OUTPATIENT)
Dept: HOME HEALTH SERVICES | Facility: HOME HEALTHCARE | Age: 84
End: 2024-09-26
Payer: COMMERCIAL

## 2024-09-26 ENCOUNTER — HOSPITAL ENCOUNTER (OUTPATIENT)
Facility: HOSPITAL | Age: 84
Setting detail: OBSERVATION
Discharge: HOME | End: 2024-09-27
Attending: EMERGENCY MEDICINE | Admitting: INTERNAL MEDICINE
Payer: MEDICARE

## 2024-09-26 VITALS
HEART RATE: 69 BPM | RESPIRATION RATE: 17 BRPM | SYSTOLIC BLOOD PRESSURE: 134 MMHG | TEMPERATURE: 96.8 F | OXYGEN SATURATION: 98 % | DIASTOLIC BLOOD PRESSURE: 72 MMHG

## 2024-09-26 DIAGNOSIS — N18.9 CHRONIC KIDNEY DISEASE, UNSPECIFIED CKD STAGE: ICD-10-CM

## 2024-09-26 DIAGNOSIS — F03.90 DEMENTIA WITHOUT BEHAVIORAL DISTURBANCE, PSYCHOTIC DISTURBANCE, MOOD DISTURBANCE, OR ANXIETY, UNSPECIFIED DEMENTIA SEVERITY, UNSPECIFIED DEMENTIA TYPE: ICD-10-CM

## 2024-09-26 DIAGNOSIS — I21.4 NSTEMI (NON-ST ELEVATED MYOCARDIAL INFARCTION): Primary | ICD-10-CM

## 2024-09-26 LAB
ALBUMIN SERPL-MCNC: 4 G/DL (ref 3.5–5.2)
ALBUMIN/GLOB SERPL: 1 G/DL
ALP SERPL-CCNC: 97 U/L (ref 39–117)
ALT SERPL W P-5'-P-CCNC: <5 U/L (ref 1–41)
ANION GAP SERPL CALCULATED.3IONS-SCNC: 11.3 MMOL/L (ref 5–15)
AST SERPL-CCNC: 14 U/L (ref 1–40)
BASOPHILS # BLD AUTO: 0.02 10*3/MM3 (ref 0–0.2)
BASOPHILS NFR BLD AUTO: 0.2 % (ref 0–1.5)
BILIRUB SERPL-MCNC: 0.2 MG/DL (ref 0–1.2)
BILIRUB UR QL STRIP: NEGATIVE
BUN SERPL-MCNC: 36 MG/DL (ref 8–23)
BUN/CREAT SERPL: 19.4 (ref 7–25)
CALCIUM SPEC-SCNC: 9.9 MG/DL (ref 8.6–10.5)
CHLORIDE SERPL-SCNC: 101 MMOL/L (ref 98–107)
CLARITY UR: CLEAR
CO2 SERPL-SCNC: 25.7 MMOL/L (ref 22–29)
COLOR UR: YELLOW
CREAT SERPL-MCNC: 1.86 MG/DL (ref 0.76–1.27)
D-LACTATE SERPL-SCNC: 1.5 MMOL/L (ref 0.5–2)
DEPRECATED RDW RBC AUTO: 52.9 FL (ref 37–54)
EGFRCR SERPLBLD CKD-EPI 2021: 34.8 ML/MIN/1.73
EOSINOPHIL # BLD AUTO: 0.02 10*3/MM3 (ref 0–0.4)
EOSINOPHIL NFR BLD AUTO: 0.2 % (ref 0.3–6.2)
ERYTHROCYTE [DISTWIDTH] IN BLOOD BY AUTOMATED COUNT: 15.3 % (ref 12.3–15.4)
GEN 5 2HR TROPONIN T REFLEX: 100 NG/L
GLOBULIN UR ELPH-MCNC: 3.9 GM/DL
GLUCOSE SERPL-MCNC: 133 MG/DL (ref 65–99)
GLUCOSE UR STRIP-MCNC: NEGATIVE MG/DL
HCT VFR BLD AUTO: 36.2 % (ref 37.5–51)
HGB BLD-MCNC: 11 G/DL (ref 13–17.7)
HGB UR QL STRIP.AUTO: ABNORMAL
IMM GRANULOCYTES # BLD AUTO: 0.1 10*3/MM3 (ref 0–0.05)
IMM GRANULOCYTES NFR BLD AUTO: 1 % (ref 0–0.5)
KETONES UR QL STRIP: NEGATIVE
LEUKOCYTE ESTERASE UR QL STRIP.AUTO: NEGATIVE
LYMPHOCYTES # BLD AUTO: 1.93 10*3/MM3 (ref 0.7–3.1)
LYMPHOCYTES NFR BLD AUTO: 18.6 % (ref 19.6–45.3)
MCH RBC QN AUTO: 28.5 PG (ref 26.6–33)
MCHC RBC AUTO-ENTMCNC: 30.4 G/DL (ref 31.5–35.7)
MCV RBC AUTO: 93.8 FL (ref 79–97)
MONOCYTES # BLD AUTO: 0.65 10*3/MM3 (ref 0.1–0.9)
MONOCYTES NFR BLD AUTO: 6.3 % (ref 5–12)
NEUTROPHILS NFR BLD AUTO: 7.63 10*3/MM3 (ref 1.7–7)
NEUTROPHILS NFR BLD AUTO: 73.7 % (ref 42.7–76)
NITRITE UR QL STRIP: NEGATIVE
NT-PROBNP SERPL-MCNC: 1125 PG/ML (ref 0–1800)
PH UR STRIP.AUTO: 5.5 [PH] (ref 5–8)
PLATELET # BLD AUTO: 221 10*3/MM3 (ref 140–450)
PMV BLD AUTO: 9.5 FL (ref 6–12)
POTASSIUM SERPL-SCNC: 4.6 MMOL/L (ref 3.5–5.2)
PROT SERPL-MCNC: 7.9 G/DL (ref 6–8.5)
PROT UR QL STRIP: NEGATIVE
QT INTERVAL: 425 MS
QTC INTERVAL: 449 MS
RBC # BLD AUTO: 3.86 10*6/MM3 (ref 4.14–5.8)
SODIUM SERPL-SCNC: 138 MMOL/L (ref 136–145)
SP GR UR STRIP: 1.01 (ref 1–1.03)
TROPONIN T DELTA: -15 NG/L
TROPONIN T SERPL HS-MCNC: 115 NG/L
UROBILINOGEN UR QL STRIP: ABNORMAL
WBC NRBC COR # BLD AUTO: 10.35 10*3/MM3 (ref 3.4–10.8)

## 2024-09-26 PROCEDURE — 93010 ELECTROCARDIOGRAM REPORT: CPT | Performed by: INTERNAL MEDICINE

## 2024-09-26 PROCEDURE — 85025 COMPLETE CBC W/AUTO DIFF WBC: CPT | Performed by: EMERGENCY MEDICINE

## 2024-09-26 PROCEDURE — 99285 EMERGENCY DEPT VISIT HI MDM: CPT

## 2024-09-26 PROCEDURE — G0151 HHCP-SERV OF PT,EA 15 MIN: HCPCS

## 2024-09-26 PROCEDURE — 93005 ELECTROCARDIOGRAM TRACING: CPT | Performed by: EMERGENCY MEDICINE

## 2024-09-26 PROCEDURE — 80053 COMPREHEN METABOLIC PANEL: CPT | Performed by: EMERGENCY MEDICINE

## 2024-09-26 PROCEDURE — 25810000003 SODIUM CHLORIDE 0.9 % SOLUTION: Performed by: EMERGENCY MEDICINE

## 2024-09-26 PROCEDURE — 96360 HYDRATION IV INFUSION INIT: CPT

## 2024-09-26 PROCEDURE — 71045 X-RAY EXAM CHEST 1 VIEW: CPT

## 2024-09-26 PROCEDURE — 36415 COLL VENOUS BLD VENIPUNCTURE: CPT

## 2024-09-26 PROCEDURE — 25810000003 SODIUM CHLORIDE 0.9 % SOLUTION: Performed by: NURSE PRACTITIONER

## 2024-09-26 PROCEDURE — 84484 ASSAY OF TROPONIN QUANT: CPT | Performed by: EMERGENCY MEDICINE

## 2024-09-26 PROCEDURE — 87040 BLOOD CULTURE FOR BACTERIA: CPT | Performed by: EMERGENCY MEDICINE

## 2024-09-26 PROCEDURE — 83605 ASSAY OF LACTIC ACID: CPT | Performed by: EMERGENCY MEDICINE

## 2024-09-26 PROCEDURE — 99285 EMERGENCY DEPT VISIT HI MDM: CPT | Performed by: EMERGENCY MEDICINE

## 2024-09-26 PROCEDURE — G0378 HOSPITAL OBSERVATION PER HR: HCPCS

## 2024-09-26 PROCEDURE — 71250 CT THORAX DX C-: CPT

## 2024-09-26 PROCEDURE — 51798 US URINE CAPACITY MEASURE: CPT

## 2024-09-26 PROCEDURE — 81003 URINALYSIS AUTO W/O SCOPE: CPT | Performed by: EMERGENCY MEDICINE

## 2024-09-26 PROCEDURE — 83880 ASSAY OF NATRIURETIC PEPTIDE: CPT | Performed by: EMERGENCY MEDICINE

## 2024-09-26 RX ORDER — ACETAMINOPHEN 325 MG/1
650 TABLET ORAL EVERY 4 HOURS PRN
Status: DISCONTINUED | OUTPATIENT
Start: 2024-09-26 | End: 2024-09-27 | Stop reason: HOSPADM

## 2024-09-26 RX ORDER — ONDANSETRON 2 MG/ML
4 INJECTION INTRAMUSCULAR; INTRAVENOUS EVERY 6 HOURS PRN
Status: DISCONTINUED | OUTPATIENT
Start: 2024-09-26 | End: 2024-09-27 | Stop reason: HOSPADM

## 2024-09-26 RX ORDER — ALUMINA, MAGNESIA, AND SIMETHICONE 2400; 2400; 240 MG/30ML; MG/30ML; MG/30ML
15 SUSPENSION ORAL EVERY 6 HOURS PRN
Status: DISCONTINUED | OUTPATIENT
Start: 2024-09-26 | End: 2024-09-27 | Stop reason: HOSPADM

## 2024-09-26 RX ORDER — SODIUM CHLORIDE 9 MG/ML
75 INJECTION, SOLUTION INTRAVENOUS CONTINUOUS
Status: DISCONTINUED | OUTPATIENT
Start: 2024-09-27 | End: 2024-09-27 | Stop reason: HOSPADM

## 2024-09-26 RX ORDER — AMOXICILLIN 250 MG
2 CAPSULE ORAL 2 TIMES DAILY PRN
Status: DISCONTINUED | OUTPATIENT
Start: 2024-09-26 | End: 2024-09-27 | Stop reason: HOSPADM

## 2024-09-26 RX ORDER — POLYETHYLENE GLYCOL 3350 17 G/17G
17 POWDER, FOR SOLUTION ORAL DAILY PRN
Status: DISCONTINUED | OUTPATIENT
Start: 2024-09-26 | End: 2024-09-27 | Stop reason: HOSPADM

## 2024-09-26 RX ORDER — BISACODYL 10 MG
10 SUPPOSITORY, RECTAL RECTAL DAILY PRN
Status: DISCONTINUED | OUTPATIENT
Start: 2024-09-26 | End: 2024-09-27 | Stop reason: HOSPADM

## 2024-09-26 RX ORDER — ONDANSETRON 4 MG/1
4 TABLET, ORALLY DISINTEGRATING ORAL EVERY 6 HOURS PRN
Status: DISCONTINUED | OUTPATIENT
Start: 2024-09-26 | End: 2024-09-27 | Stop reason: HOSPADM

## 2024-09-26 RX ORDER — SODIUM CHLORIDE 0.9 % (FLUSH) 0.9 %
10 SYRINGE (ML) INJECTION AS NEEDED
Status: DISCONTINUED | OUTPATIENT
Start: 2024-09-26 | End: 2024-09-27 | Stop reason: HOSPADM

## 2024-09-26 RX ORDER — ACETAMINOPHEN 160 MG/5ML
650 SOLUTION ORAL EVERY 4 HOURS PRN
Status: DISCONTINUED | OUTPATIENT
Start: 2024-09-26 | End: 2024-09-27 | Stop reason: HOSPADM

## 2024-09-26 RX ORDER — NITROGLYCERIN 0.4 MG/1
0.4 TABLET SUBLINGUAL
Status: DISCONTINUED | OUTPATIENT
Start: 2024-09-26 | End: 2024-09-27 | Stop reason: HOSPADM

## 2024-09-26 RX ORDER — ASPIRIN 81 MG/1
324 TABLET, CHEWABLE ORAL ONCE
Status: COMPLETED | OUTPATIENT
Start: 2024-09-26 | End: 2024-09-26

## 2024-09-26 RX ORDER — ACETAMINOPHEN 650 MG/1
650 SUPPOSITORY RECTAL EVERY 4 HOURS PRN
Status: DISCONTINUED | OUTPATIENT
Start: 2024-09-26 | End: 2024-09-27 | Stop reason: HOSPADM

## 2024-09-26 RX ORDER — BISACODYL 5 MG/1
5 TABLET, DELAYED RELEASE ORAL DAILY PRN
Status: DISCONTINUED | OUTPATIENT
Start: 2024-09-26 | End: 2024-09-27 | Stop reason: HOSPADM

## 2024-09-26 RX ADMIN — SODIUM CHLORIDE 500 ML: 9 INJECTION, SOLUTION INTRAVENOUS at 17:45

## 2024-09-26 RX ADMIN — SODIUM CHLORIDE 100 ML/HR: 9 INJECTION, SOLUTION INTRAVENOUS at 23:50

## 2024-09-26 RX ADMIN — ASPIRIN 324 MG: 81 TABLET, CHEWABLE ORAL at 17:45

## 2024-09-26 NOTE — FSED PROVIDER NOTE
Subjective   History of Present Illness  83yo male pmh significant hyperlipidemia/PMR/CKD/RA/parkinsons disease/pulmonary fibrosis/dementia, recently discharged [facility] 09.10.2024 secondary to sepsis/goldie/LLE cellulitis, presents ED reported recent urinary frequency followed by no urinary output since this morning.  Pt additionally reportedly has been sleeping more.  Pt denies acute physical complaints at time of exam.    History provided by:  Relative, caregiver and patient  History limited by:  Dementia  Illness      Review of Systems   Unable to perform ROS: Dementia   Constitutional: Negative.    HENT: Negative.     Respiratory: Negative.     Cardiovascular: Negative.    Gastrointestinal: Negative.    Genitourinary:  Positive for difficulty urinating.   All other systems reviewed and are negative.      Past Medical History:   Diagnosis Date    Alzheimer disease     CKD (chronic kidney disease)     Hyperlipidemia     Interstitial lung disease     Parkinson disease     PMR (polymyalgia rheumatica)     Renal cancer        Allergies   Allergen Reactions    Augmentin [Amoxicillin-Pot Clavulanate] Rash    Neosporin [Bacitracin-Polymyxin B] Rash       Past Surgical History:   Procedure Laterality Date    NEPHRECTOMY Right        History reviewed. No pertinent family history.    Social History     Socioeconomic History    Marital status:    Tobacco Use    Smoking status: Unknown   Vaping Use    Vaping status: Never Used   Substance and Sexual Activity    Drug use: Never    Sexual activity: Defer           Objective   Physical Exam  Vitals and nursing note reviewed.   Constitutional:       Appearance: He is not toxic-appearing or diaphoretic.   HENT:      Head: Normocephalic and atraumatic.      Right Ear: External ear normal. There is impacted cerumen.      Left Ear: Tympanic membrane, ear canal and external ear normal.      Nose: Nose normal.      Mouth/Throat:      Mouth: Mucous membranes are moist.       Pharynx: Oropharynx is clear.   Eyes:      Conjunctiva/sclera: Conjunctivae normal.      Pupils: Pupils are equal, round, and reactive to light.   Cardiovascular:      Rate and Rhythm: Normal rate and regular rhythm.      Pulses: Normal pulses.      Heart sounds: Normal heart sounds. No murmur heard.     No friction rub. No gallop.   Pulmonary:      Effort: Pulmonary effort is normal. No respiratory distress.      Breath sounds: Normal breath sounds. No wheezing, rhonchi or rales.   Abdominal:      General: Bowel sounds are normal. There is no distension.      Palpations: Abdomen is soft.      Tenderness: There is no abdominal tenderness. There is no guarding or rebound.   Musculoskeletal:         General: No swelling or deformity.      Cervical back: Normal range of motion and neck supple. No rigidity.        Feet:    Lymphadenopathy:      Cervical: No cervical adenopathy.   Skin:     General: Skin is warm and dry.      Coloration: Skin is pale.   Neurological:      General: No focal deficit present.      Mental Status: He is easily aroused.      GCS: GCS eye subscore is 4. GCS verbal subscore is 4. GCS motor subscore is 6.      Sensory: Sensation is intact.      Motor: Motor function is intact.         ECG 12 Lead      Date/Time: 9/26/2024 4:19 PM    Performed by: Denny Morejon MD  Authorized by: Denny Morejon MD  Interpreted by ED physician  Rhythm: sinus rhythm  Rate: normal  BPM: 67  QRS axis: left  Conduction: 1st degree  ST Segments: ST segments normal  T Waves: T waves normal  Other findings: LVH  Clinical impression: abnormal ECG               ED Course  ED Course as of 09/26/24 1751   Thu Sep 26, 2024   1615 D/w patient wife (POA) as well as patient daughter who are agreeable to lab evaluation/CT chest imaging; hall catheter declined at this time. [SD]   1731 Patient denies chest pain [SD]   1733 Cardiology paged [SD]   1741 D/w Dr. Walsh, cardiology. States no heparin administration at this time.  Plan admit hospitalist service. [SD]   0494 [facility] paged for admit. [SD]   2569 D/w Dr. Duran, [facility] hospitalist accepting. Pt stable transport. [SD]      ED Course User Index  [SD] Denny Morejon MD      Labs Reviewed   COMPREHENSIVE METABOLIC PANEL - Abnormal; Notable for the following components:       Result Value    Glucose 133 (*)     BUN 36 (*)     Creatinine 1.86 (*)     eGFR 34.8 (*)     All other components within normal limits    Narrative:     GFR Normal >60  Chronic Kidney Disease <60  Kidney Failure <15    The GFR formula is only valid for adults with stable renal function between ages 18 and 70.   URINALYSIS WITHOUT MICROSCOPIC (NO CULTURE) - Abnormal; Notable for the following components:    Blood, UA Trace (*)     All other components within normal limits   CBC WITH AUTO DIFFERENTIAL - Abnormal; Notable for the following components:    RBC 3.86 (*)     Hemoglobin 11.0 (*)     Hematocrit 36.2 (*)     MCHC 30.4 (*)     Lymphocyte % 18.6 (*)     Eosinophil % 0.2 (*)     Immature Grans % 1.0 (*)     Neutrophils, Absolute 7.63 (*)     Immature Grans, Absolute 0.10 (*)     All other components within normal limits   TROPONIN - Abnormal; Notable for the following components:    HS Troponin T 115 (*)     All other components within normal limits    Narrative:     High Sensitive Troponin T Reference Range:  <14.0 ng/L- Negative Female for AMI  <22.0 ng/L- Negative Male for AMI  >=14 - Abnormal Female indicating possible myocardial injury.  >=22 - Abnormal Male indicating possible myocardial injury.   Clinicians would have to utilize clinical acumen, EKG, Troponin, and serial changes to determine if it is an Acute Myocardial Infarction or myocardial injury due to an underlying chronic condition.        BNP (IN-HOUSE) - Normal    Narrative:     This assay is used as an aid in the diagnosis of individuals suspected of having heart failure. It can be used as an aid in the diagnosis of acute decompensated heart  failure (ADHF) in patients presenting with signs and symptoms of ADHF to the emergency department (ED). In addition, NT-proBNP of <300 pg/mL indicates ADHF is not likely.    Age Range Result Interpretation  NT-proBNP Concentration (pg/mL:      <50             Positive            >450                   Gray                 300-450                    Negative             <300    50-75           Positive            >900                  Gray                300-900                  Negative            <300      >75             Positive            >1800                  Gray                300-1800                  Negative            <300   LACTIC ACID, PLASMA - Normal   BLOOD CULTURE   BLOOD CULTURE   HIGH SENSITIVITIY TROPONIN T 2HR   CBC AND DIFFERENTIAL    Narrative:     The following orders were created for panel order CBC & Differential.  Procedure                               Abnormality         Status                     ---------                               -----------         ------                     CBC Auto Differential[[id number]]        Abnormal            Final result                 Please view results for these tests on the individual orders.     CT Chest Without Contrast Diagnostic    Result Date: 9/26/2024  Narrative: CT CHEST WO CONTRAST DIAGNOSTIC-  Radiation dose reduction techniques were utilized, including automated exposure control and exposure modulation based on body size.  Clinical: Pulmonary fibrosis, fatigue and weakness  COMPARISON 9/6/2024  FINDINGS: Cardiac enlargement similar to the previous examination. There is coronary artery calcification. The esophagus is satisfactory in course and caliber. There is atherosclerotic calcification of a normal diameter aorta. There are small calcified and noncalcified mediastinal and hilar lymph nodes again demonstrated. Biapical areas of groundglass opacity considerably improved within the interim with only nominal residual at this time.  Pulmonary fibrosis demonstrated bilaterally. There is bronchiectasis again demonstrated. No suspicious pulmonary lesion or acute airspace disease has developed. No pleural effusion seen. Limited images through the upper abdomen are unremarkable.  CONCLUSION: Groundglass opacities at both lung apices improved within the interim with only nominal residual at this time. There is widespread pulmonary fibrosis similar to the previous examination. No new airspace disease or pleural effusion has developed.     This report was finalized on 9/26/2024 5:07 PM by Dr. Peter Parra M.D on Workstation: [id number]      XR Chest 1 View    Result Date: 9/26/2024  Narrative: XR CHEST 1 VW-  HISTORY: Male who is 84 years-old, weakness  TECHNIQUE: Frontal view of the chest  COMPARISON: 9/2/2024  FINDINGS: The heart size is borderline. Patchy bilateral pulmonary opacities, that may represent a combination of chronic interstitial disease and superimposed pneumonitis, appears mildly decreased on the left, in comparison with the prior exam, follow-up as indications persist. No pleural effusion, or pneumothorax. No acute osseous process.      Impression: As described.  This report was finalized on 9/26/2024 3:15 PM by Dr. Jose Manuel Fox M.D on Workstation: [id number]      Adult Transthoracic Echo Complete W/ Cont if Necessary Per Protocol    Result Date: 9/7/2024  Narrative:   Left ventricular systolic function is normal. Calculated left ventricular EF = 60.6%   Left ventricular wall thickness is consistent with mild concentric hypertrophy.   Left ventricular diastolic function is consistent with age.   Moderate aortic valve stenosis is present. Aortic valve area is 1.5 cm2.   Peak velocity of the flow distal to the aortic valve is 380.2 cm/s. Aortic valve maximum pressure gradient is 58 mmHg. Aortic valve mean pressure gradient is 30 mmHg. Aortic valve dimensionless index is 0.4 .     CT Chest Without Contrast Diagnostic    Result  Date: 9/6/2024  Narrative: CT CHEST WITHOUT CONTRAST  HISTORY: Worsening cough  TECHNIQUE: Radiation dose reduction techniques were utilized, including automated exposure control and exposure modulation based on body size. CT scan of the chest without the administration of IV contrast was performed. Coronal and sagittal reformatted images obtained.  COMPARISON: CT of the abdomen pelvis 9/3/2024, chest x-ray 9/2/2024  FINDINGS: There are some patchy groundglass infiltrates present in the lung apices in both upper lobes. There is peripheral and basilar predominant interstitial septal thickening and groundglass opacity associated with some areas of bronchiectasis. Small area of secretions seen within the bronchus intermedius. There are mildly enlarged mediastinal lymph nodes favored to be reactive given the findings in the lungs. Trace pleural effusions are seen bilaterally. Coronary artery calcifications are present. Limited imaging of the upper abdomen demonstrate the possibility of ductal dilation or cystic lesion in the pancreatic tail associated with some calcifications. This is indeterminate however without contrast. Suggest evaluation with MRI abdomen with and without contrast with MRCP sequences or CT of the abdomen with contrast pancreas protocol. No acute bony abnormality. Degenerative changes thoracic spine and shoulders      Impression: 1. Patchy groundglass infiltrates in the lung apices and upper lobe are favored to be infectious/inflammatory or edema 2. Peripheral and basilar predominant interstitial septal thickening with groundglass opacity associated with areas of bronchiectasis. These findings are indeterminate. Areas of chronic fibrotic change are suggested given the bronchiectasis however acute superimposed infectious/inflammatory process or edema may also be present. Follow-up recommended to evaluate stability/clearing 3. Possible ductal dilation or cystic lesion involving the pancreatic tail  associated with some calcifications, indeterminate without contrast. Suggest evaluation with MRI abdomen or CT abdomen as described above   Radiation dose reduction techniques were utilized, including automated exposure control and exposure modulation based on body size.   This report was finalized on 9/6/2024 3:14 PM by Dr. Juan A Jay M.D on Workstation: [id number]      MRI Foot Left Without Contrast    Result Date: 9/6/2024  Narrative: MRI FOOT LEFT WO CONTRAST-  Date of Exam: 9/5/2024 8:25 PM  Indication: Chronic wound on the left foot, evaluate for deep infection. Redness, swelling, and open wound at the left forefoot.  Comparison: None available.  Technique: Multiplanar, multisequence MR imaging of the midfoot and forefoot was performed without the intravenous administration of contrast.  FINDINGS: SOFT TISSUES: A marker has been placed at the medial and plantar junction of the midfoot and forefoot, indicating the area of clinical concern. Deep to the marker, there is a focal open soft tissue wound extending through the subcutaneous fat in the medial plantar fascia to the superficial underlying musculature. Diffuse soft tissue edema, greatest at the dorsum of the forefoot. No well-formed or drainable fluid collection definitive for abscess. No cystic or solid soft tissue mass. The intermetatarsal spaces are unremarkable.  BONES: Multifocal likely degenerative subchondral cystic changes across the first and second TMT joints and the great toe MTP joint. Large osteophyte formation at the dorsal distal medial cuneiform and the dorsal base of the great toe metatarsal. No acute fracture. No concerning bone marrow lesion or marrow replacing process.  JOINTS: No significant joint effusion. No discrete intra-articular body. 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 Moderate to severe chondromalacia/DJD at the great toe MTP joint. The midfoot is well aligned.   LIGAMENTS: The Lisfranc ligament complex is intact. The MTP collateral ligaments are intact.  MUSCLES AND TENDONS: The flexor and extensor tendons are intact.  The plantar plates are grossly intact. Severe diffuse muscular fatty atrophy, likely neuropathic, with mild diffuse muscular edema that could also be neuropathic or could reflect myositis.      Impression:  1. Focal wound at the medial and plantar junction of the midfoot and forefoot extending through the subcutaneous fat and with focal extension through the medial plantar fascia to the superficial aspect of the right thigh musculature. Diffuse soft tissue edema with no well-formed or drainable fluid collection to suggest abscess. 2. No evidence of osteomyelitis. 3. 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 4. Moderate to severe chondromalacia/DJD at the great toe MTP joint. 5. Severe diffuse muscular fatty atrophy, likely neuropathic, with mild diffuse muscular edema that could also be neuropathic or could represent myositis.     This report was finalized on 9/6/2024 7:47 AM by Silvestre Mejia MD on Workstation: [id number]      Duplex Venous Lower Extremity - Bilateral CAR    Result Date: 9/5/2024  Narrative:   Chronic right lower extremity superficial thrombophlebitis noted in the small saphenous and varicosity (below knee).   Chronic left lower extremity superficial thrombophlebitis noted in the small saphenous.   All other veins appeared normal bilaterally.     CT Abdomen Pelvis Without Contrast    Result Date: 9/3/2024  Narrative: CT OF THE ABDOMEN PELVIS WITHOUT CONTRAST  HISTORY: Sepsis  COMPARISON: None available  TECHNIQUE: Axial CT imaging was obtained through the abdomen and pelvis. No IV contrast was administered.  FINDINGS: Images through the lung bases demonstrate chronic fibrotic changes, there is some more focal airspace consolidation noted at both lung bases. I'm uncertain if this is further  chronic scarring, or a superimposed infiltrate, in the absence of any prior studies for comparison. There are coronary artery calcifications. There are dystrophic calcifications of the aortic valve annulus. No suspicious hepatic lesions are seen. The gallbladder, adrenal glands, and spleen are normal. There are some pancreatic parenchymal calcifications, which may reflect changes of chronic pancreatitis. Stomach and duodenum appear mildly distended and fluid-filled. Right kidney is absent. No hydronephrosis is seen on the left. There is a simple appearing left renal cyst. No additional follow-up is necessary. There are extensive aortoiliac calcifications. Urinary bladder is thick walled, and correlation with urinalysis and urine cultures is recommended. Prostate gland contains dystrophic calcifications. Rectal vault is distended with stool, suggesting impaction. There is colonic diverticulosis. There is no bowel obstruction. There is no evidence of appendicitis. There is a fat-containing umbilical hernia. There is some perinephric and periureteral stranding on the left, which may reflect ascending urinary tract infection. No acute osseous abnormalities are seen. There is lumbar scoliosis, with convexity to the left.      Impression:  1.The patient's stomach and duodenum appear somewhat patulous, which may reflect gastroenteritis. 2. Fibrotic changes are noted at the lung bases bilaterally. There are patchy areas of airspace consolidation noted at the lung bases. It is impossible to know if the patient has any superimposed infiltrates, in the absence of any prior studies for comparison. 3. Increased stool burden within the rectal vault, suggesting fecal impaction. There is no evidence of proctitis. 4. Walled appearance to the urinary bladder, along with some perinephric and periureteral stranding on the left, which may reflect cystitis and ascending urinary tract infection.  Radiation dose reduction techniques were  utilized, including automated exposure control and exposure modulation based on body size.   This report was finalized on 9/3/2024 1:03 AM by Dr. Jessie Oviedo M.D on Workstation: [id number] Invest Grow ([id number]      XR Chest 1 View    Result Date: 9/2/2024  Narrative: SINGLE VIEW OF THE CHEST  HISTORY: Sepsis  COMPARISON: None available.  FINDINGS: There is cardiomegaly. Lung volumes appear diminished, with diffuse interstitial prominence. Appearance suggests underlying chronic interstitial lung disease and pulmonary fibrosis. Prior report did describe chronic interstitial changes. Cannot exclude infiltrate within the left lung, in the absence of any prior studies for comparison. No pneumothorax or large effusion is seen. There are advanced degenerative changes of the left shoulder.      Impression: Overall diminished lung volumes, with diffuse interstitial prominence, suggesting underlying chronic interstitial lung disease and pulmonary fibrosis. I cannot exclude some infiltrate at the left lung base, in the absence of any prior studies for comparison.  This report was finalized on 9/2/2024 11:59 PM by Dr. Jessie Oviedo M.D on Workstation: [id number]      CT Head Without Contrast    Result Date: 9/2/2024  Narrative: CT OF THE HEAD WITHOUT CONTRAST  HISTORY: Altered mental status  COMPARISON: None available.  TECHNIQUE: Axial CT imaging was obtained through the brain. No IV contrast was administered.  FINDINGS: No acute intracranial hemorrhage is seen. There is diffuse atrophy. There is periventricular and deep white matter pathic change. There is no midline shift or mass effect. There is mucosal thickening noted within the ethmoid and maxillary sinuses. Mastoid air cells are clear.       Impression: No acute intracranial findings.  Radiation dose reduction techniques were utilized, including automated exposure control and exposure modulation based on body size.   This report was finalized on 9/2/2024 10:47 PM by Dr. Roman  YESSI Oviedo on Workstation: [id number]                 HEART Score: 5                            Medical Decision Making  Problems Addressed:  Chronic kidney disease, unspecified CKD stage: complicated acute illness or injury  Dementia without behavioral disturbance, psychotic disturbance, mood disturbance, or anxiety, unspecified dementia severity, unspecified dementia type: complicated acute illness or injury  NSTEMI (non-ST elevated myocardial infarction): complicated acute illness or injury    Amount and/or Complexity of Data Reviewed  Labs: ordered.  Radiology: ordered.  ECG/medicine tests: ordered and independent interpretation performed.    Risk  OTC drugs.  Decision regarding hospitalization.        Final diagnoses:   NSTEMI (non-ST elevated myocardial infarction)   Chronic kidney disease, unspecified CKD stage   Dementia without behavioral disturbance, psychotic disturbance, mood disturbance, or anxiety, unspecified dementia severity, unspecified dementia type       ED Disposition  ED Disposition       ED Disposition   Decision to Admit    Condition   --    Comment   Level of Care: Telemetry [5]   Diagnosis: NSTEMI (non-ST elevated myocardial infarction) [027820]   Admitting Physician: VINAY BHAT [[id number]]   Attending Physician: VINAY BHAT [[id number]]                 No follow-up provider specified.       Medication List      No changes were made to your prescriptions during this visit.

## 2024-09-26 NOTE — ED NOTES
Yudyfang Kruger, wife/POA, refused lab work at this time for pt. This RN and MURRAY Guillen confirmed via telephone with Yudy Kruger at [phone].

## 2024-09-26 NOTE — ED NOTES
Pt family now allowing ER work up, new ER acknowledgment ordered, and double nurse signed off for care.     Wilmer Harkins RN

## 2024-09-27 ENCOUNTER — READMISSION MANAGEMENT (OUTPATIENT)
Dept: CALL CENTER | Facility: HOSPITAL | Age: 84
End: 2024-09-27
Payer: MEDICARE

## 2024-09-27 ENCOUNTER — DOCUMENTATION (OUTPATIENT)
Dept: HOME HEALTH SERVICES | Facility: HOME HEALTHCARE | Age: 84
End: 2024-09-27
Payer: COMMERCIAL

## 2024-09-27 ENCOUNTER — HOME CARE VISIT (OUTPATIENT)
Dept: HOME HEALTH SERVICES | Facility: HOME HEALTHCARE | Age: 84
End: 2024-09-27
Payer: COMMERCIAL

## 2024-09-27 ENCOUNTER — TRANSCRIBE ORDERS (OUTPATIENT)
Dept: HOME HEALTH SERVICES | Facility: HOME HEALTHCARE | Age: 84
End: 2024-09-27
Payer: COMMERCIAL

## 2024-09-27 VITALS
DIASTOLIC BLOOD PRESSURE: 69 MMHG | HEIGHT: 74 IN | SYSTOLIC BLOOD PRESSURE: 132 MMHG | HEART RATE: 67 BPM | BODY MASS INDEX: 30.8 KG/M2 | RESPIRATION RATE: 18 BRPM | TEMPERATURE: 97.9 F | WEIGHT: 240 LBS | OXYGEN SATURATION: 97 %

## 2024-09-27 DIAGNOSIS — S91.309A UNSPECIFIED OPEN WOUND, UNSPECIFIED FOOT, INITIAL ENCOUNTER: Primary | ICD-10-CM

## 2024-09-27 DIAGNOSIS — N18.32 STAGE 3B CHRONIC KIDNEY DISEASE: ICD-10-CM

## 2024-09-27 DIAGNOSIS — M35.3 POLYMYALGIA RHEUMATICA: ICD-10-CM

## 2024-09-27 PROBLEM — R79.89 ELEVATED TROPONIN: Status: ACTIVE | Noted: 2024-09-26

## 2024-09-27 LAB
ALBUMIN SERPL-MCNC: 3.5 G/DL (ref 3.5–5.2)
ALBUMIN/GLOB SERPL: 1 G/DL
ALP SERPL-CCNC: 80 U/L (ref 39–117)
ALT SERPL W P-5'-P-CCNC: 8 U/L (ref 1–41)
ANION GAP SERPL CALCULATED.3IONS-SCNC: 15.7 MMOL/L (ref 5–15)
AST SERPL-CCNC: 15 U/L (ref 1–40)
BILIRUB SERPL-MCNC: 0.3 MG/DL (ref 0–1.2)
BUN SERPL-MCNC: 28 MG/DL (ref 8–23)
BUN/CREAT SERPL: 16 (ref 7–25)
CALCIUM SPEC-SCNC: 9.3 MG/DL (ref 8.6–10.5)
CHLORIDE SERPL-SCNC: 102 MMOL/L (ref 98–107)
CHOLEST SERPL-MCNC: 143 MG/DL (ref 0–200)
CO2 SERPL-SCNC: 20.3 MMOL/L (ref 22–29)
CREAT SERPL-MCNC: 1.75 MG/DL (ref 0.76–1.27)
DEPRECATED RDW RBC AUTO: 50.1 FL (ref 37–54)
EGFRCR SERPLBLD CKD-EPI 2021: 37.4 ML/MIN/1.73
ERYTHROCYTE [DISTWIDTH] IN BLOOD BY AUTOMATED COUNT: 14.8 % (ref 12.3–15.4)
GLOBULIN UR ELPH-MCNC: 3.6 GM/DL
GLUCOSE BLDC GLUCOMTR-MCNC: 139 MG/DL (ref 70–130)
GLUCOSE BLDC GLUCOMTR-MCNC: 147 MG/DL (ref 70–130)
GLUCOSE SERPL-MCNC: 99 MG/DL (ref 65–99)
HBA1C MFR BLD: 7 % (ref 4.8–5.6)
HCT VFR BLD AUTO: 35.8 % (ref 37.5–51)
HDLC SERPL-MCNC: 30 MG/DL (ref 40–60)
HGB BLD-MCNC: 11.2 G/DL (ref 13–17.7)
LDLC SERPL CALC-MCNC: 75 MG/DL (ref 0–100)
LDLC/HDLC SERPL: 2.25 {RATIO}
MCH RBC QN AUTO: 28.5 PG (ref 26.6–33)
MCHC RBC AUTO-ENTMCNC: 31.3 G/DL (ref 31.5–35.7)
MCV RBC AUTO: 91.1 FL (ref 79–97)
PLATELET # BLD AUTO: 196 10*3/MM3 (ref 140–450)
PMV BLD AUTO: 9 FL (ref 6–12)
POTASSIUM SERPL-SCNC: 4.1 MMOL/L (ref 3.5–5.2)
PROT SERPL-MCNC: 7.1 G/DL (ref 6–8.5)
QT INTERVAL: 430 MS
QTC INTERVAL: 477 MS
RBC # BLD AUTO: 3.93 10*6/MM3 (ref 4.14–5.8)
SODIUM SERPL-SCNC: 138 MMOL/L (ref 136–145)
TRIGL SERPL-MCNC: 228 MG/DL (ref 0–150)
TROPONIN T SERPL HS-MCNC: 120 NG/L
VLDLC SERPL-MCNC: 38 MG/DL (ref 5–40)
WBC NRBC COR # BLD AUTO: 8.07 10*3/MM3 (ref 3.4–10.8)

## 2024-09-27 PROCEDURE — G0378 HOSPITAL OBSERVATION PER HR: HCPCS

## 2024-09-27 PROCEDURE — 99203 OFFICE O/P NEW LOW 30 MIN: CPT | Performed by: STUDENT IN AN ORGANIZED HEALTH CARE EDUCATION/TRAINING PROGRAM

## 2024-09-27 PROCEDURE — 83036 HEMOGLOBIN GLYCOSYLATED A1C: CPT | Performed by: INTERNAL MEDICINE

## 2024-09-27 PROCEDURE — 85027 COMPLETE CBC AUTOMATED: CPT | Performed by: NURSE PRACTITIONER

## 2024-09-27 PROCEDURE — 93010 ELECTROCARDIOGRAM REPORT: CPT | Performed by: INTERNAL MEDICINE

## 2024-09-27 PROCEDURE — 80053 COMPREHEN METABOLIC PANEL: CPT | Performed by: NURSE PRACTITIONER

## 2024-09-27 PROCEDURE — 84484 ASSAY OF TROPONIN QUANT: CPT | Performed by: NURSE PRACTITIONER

## 2024-09-27 PROCEDURE — 82948 REAGENT STRIP/BLOOD GLUCOSE: CPT

## 2024-09-27 PROCEDURE — 93005 ELECTROCARDIOGRAM TRACING: CPT | Performed by: INTERNAL MEDICINE

## 2024-09-27 PROCEDURE — 96361 HYDRATE IV INFUSION ADD-ON: CPT

## 2024-09-27 PROCEDURE — 80061 LIPID PANEL: CPT | Performed by: INTERNAL MEDICINE

## 2024-09-27 RX ORDER — NICOTINE POLACRILEX 4 MG
15 LOZENGE BUCCAL
Status: DISCONTINUED | OUTPATIENT
Start: 2024-09-27 | End: 2024-09-27 | Stop reason: HOSPADM

## 2024-09-27 RX ORDER — CARBIDOPA AND LEVODOPA 50; 200 MG/1; MG/1
2 TABLET, EXTENDED RELEASE ORAL DAILY
Status: DISCONTINUED | OUTPATIENT
Start: 2024-09-27 | End: 2024-09-27 | Stop reason: HOSPADM

## 2024-09-27 RX ORDER — MODAFINIL 100 MG/1
400 TABLET ORAL DAILY
Status: DISCONTINUED | OUTPATIENT
Start: 2024-09-27 | End: 2024-09-27 | Stop reason: HOSPADM

## 2024-09-27 RX ORDER — DEXTROSE MONOHYDRATE 25 G/50ML
25 INJECTION, SOLUTION INTRAVENOUS
Status: DISCONTINUED | OUTPATIENT
Start: 2024-09-27 | End: 2024-09-27 | Stop reason: HOSPADM

## 2024-09-27 RX ORDER — ASPIRIN 81 MG/1
81 TABLET ORAL DAILY
Status: DISCONTINUED | OUTPATIENT
Start: 2024-09-27 | End: 2024-09-27 | Stop reason: HOSPADM

## 2024-09-27 RX ORDER — INSULIN LISPRO 100 [IU]/ML
2-7 INJECTION, SOLUTION INTRAVENOUS; SUBCUTANEOUS
Status: DISCONTINUED | OUTPATIENT
Start: 2024-09-27 | End: 2024-09-27 | Stop reason: HOSPADM

## 2024-09-27 RX ORDER — MEMANTINE HYDROCHLORIDE 10 MG/1
10 TABLET ORAL DAILY
Status: DISCONTINUED | OUTPATIENT
Start: 2024-09-27 | End: 2024-09-27 | Stop reason: HOSPADM

## 2024-09-27 RX ORDER — IBUPROFEN 600 MG/1
1 TABLET ORAL
Status: DISCONTINUED | OUTPATIENT
Start: 2024-09-27 | End: 2024-09-27 | Stop reason: HOSPADM

## 2024-09-27 RX ORDER — DONEPEZIL HYDROCHLORIDE 10 MG/1
10 TABLET, FILM COATED ORAL DAILY
Status: DISCONTINUED | OUTPATIENT
Start: 2024-09-27 | End: 2024-09-27 | Stop reason: HOSPADM

## 2024-09-27 RX ORDER — TAMSULOSIN HYDROCHLORIDE 0.4 MG/1
0.4 CAPSULE ORAL 2 TIMES DAILY
Status: DISCONTINUED | OUTPATIENT
Start: 2024-09-27 | End: 2024-09-27 | Stop reason: HOSPADM

## 2024-09-27 RX ORDER — ATORVASTATIN CALCIUM 80 MG/1
80 TABLET, FILM COATED ORAL DAILY
Status: DISCONTINUED | OUTPATIENT
Start: 2024-09-27 | End: 2024-09-27 | Stop reason: HOSPADM

## 2024-09-27 RX ORDER — FLUTICASONE PROPIONATE 50 UG/1
2 SPRAY, METERED NASAL DAILY
Status: DISCONTINUED | OUTPATIENT
Start: 2024-09-27 | End: 2024-09-27 | Stop reason: HOSPADM

## 2024-09-27 RX ORDER — ICOSAPENT ETHYL 1 G/1
2 CAPSULE ORAL DAILY
Status: DISCONTINUED | OUTPATIENT
Start: 2024-09-27 | End: 2024-09-27 | Stop reason: HOSPADM

## 2024-09-27 RX ORDER — PROBENECID 500 MG/1
500 TABLET, FILM COATED ORAL 2 TIMES DAILY
Status: DISCONTINUED | OUTPATIENT
Start: 2024-09-27 | End: 2024-09-27 | Stop reason: HOSPADM

## 2024-09-27 RX ADMIN — PROBENECID 500 MG: 500 TABLET, FILM COATED ORAL at 12:21

## 2024-09-27 RX ADMIN — MEMANTINE HYDROCHLORIDE 10 MG: 10 TABLET, FILM COATED ORAL at 12:21

## 2024-09-27 RX ADMIN — TAMSULOSIN HYDROCHLORIDE 0.4 MG: 0.4 CAPSULE ORAL at 12:21

## 2024-09-27 RX ADMIN — DONEPEZIL HYDROCHLORIDE 10 MG: 10 TABLET, FILM COATED ORAL at 12:21

## 2024-09-27 RX ADMIN — ASPIRIN 81 MG: 81 TABLET, COATED ORAL at 12:21

## 2024-09-27 RX ADMIN — MODAFINIL 400 MG: 100 TABLET ORAL at 12:21

## 2024-09-27 RX ADMIN — CARBIDOPA AND LEVODOPA 2 TABLET: 50; 200 TABLET, EXTENDED RELEASE ORAL at 12:21

## 2024-09-27 RX ADMIN — ATORVASTATIN CALCIUM 80 MG: 80 TABLET, FILM COATED ORAL at 12:21

## 2024-09-27 NOTE — DISCHARGE SUMMARY
Date of Admission: 9/26/2024  Date of Discharge:  9/27/2024  Primary Care Physician: Ingris Nguyen MD     Discharge Diagnosis:  Active Hospital Problems    Diagnosis  POA    **Elevated troponin [R79.89]  Yes    Dementia without behavioral disturbance [F03.90]  Yes    Parkinson disease [G20.A1]  Yes    PMR (polymyalgia rheumatica) [M35.3]  Yes    Pulmonary fibrosis [J84.10]  Yes    Rheumatoid arthritis with negative rheumatoid factor [M06.00]  Yes    Stage 3b chronic kidney disease [N18.32]  Yes    Current chronic use of systemic steroids [Z79.52]  Not Applicable      Resolved Hospital Problems   No resolved problems to display.       Presenting Problem/History of Present Illness from H&P:  NSTEMI (non-ST elevated myocardial infarction) [I21.4]  Chronic kidney disease, unspecified CKD stage [N18.9]  Dementia without behavioral disturbance, psychotic disturbance, mood disturbance, or anxiety, unspecified dementia severity, unspecified dementia type [F03.90]     Mr. Kruger is a 84 y.o. with a history of dementia, CKD, Parkinson's, PMR, ILD, chronic left lower extremity wound who presents to [facility] complaining of urinary frequency and increased confusion/drowsiness.  He is not the best historian due to dementia but he was answering simple questions appropriately.  He denied headache.  No chest pain or palpitations reported.  Not reporting shortness of breath.  He was on 2 L when I saw him but he uses CPAP at home at night and they did not bring that in.  Not reporting any nausea vomiting diarrhea or abdominal pain.  He is not reporting any dysuria.  Family had noticed he had reported some urinary frequency previously.  Discussed with his wife at bedside and she reports that he was taken to the emergency room to check his urine.  She was upset at being admitted and was asking about cardiology evaluation.  Discussed with his daughter over the phone who is a physician in [place].  Obtained  additional history from her with recent admission here for sepsis of uncertain etiology and chronic left foot wound.  She reports that he had improved on antibiotics and they were concerned that he might be developing an infection again with his increased drowsiness.  We discussed the troponin levels and cardiology consultation.  She was aware of the difficulty with his dementia.     Hospital Course:  The patient is a 84 y.o. male who presented with elevated troponin without chest pain.  Cardiology consulted and he did not require acute ischemic evaluation.  He had some improvement in his mentation with supportive care while here.  Wound care evaluated the chronic left foot wound and he needs to continue outpatient treatment for that.    He needs to continue his medications for dementia and Parkinson's.  He is on chronic steroid for PMR and RA.  He has pulmonary fibrosis and LOVE uses CPAP/BiPAP at home.  He has CKD 3B.    A1c was 7.0.  He is on chronic steroids so would recommend that he follow-up with primary care to review this and see if any oral diabetic medication is needed.  His fasting glucose was less than 120 and random glucoses were all less than 200 here.  I ordered the A1c as part of the cardiac screening.    Wife at bedside was wanting him to have cardiac workup only and was adamant about getting him out of the hospital today.    Exam Today:  Constitutional:       General: He is not in acute distress.     Appearance: He is not diaphoretic.   HENT:      Head: Atraumatic.   Eyes:      Conjunctiva/sclera: Conjunctivae normal.   Cardiovascular:      Rate and Rhythm: Normal rate and regular rhythm.      Pulses: Normal pulses.   Pulmonary:      Effort: Pulmonary effort is normal.      Breath sounds: No wheezing.   Abdominal:      General: There is no distension.      Palpations: Abdomen is soft.      Tenderness: There is no abdominal tenderness. There is no guarding or rebound.   Musculoskeletal:          General: No tenderness.   Skin:     General: Skin is dry.   Neurological:      Mental Status: He is alert.      Cranial Nerves: No cranial nerve deficit.   Psychiatric:         Mood and Affect: Mood normal.         Behavior: Behavior normal.         Cognition and Memory: Memory is impaired.     Results:  CXR  The heart size is borderline. Patchy bilateral pulmonary  opacities, that may represent a combination of chronic interstitial  disease and superimposed pneumonitis, appears mildly decreased on the  left, in comparison with the prior exam, follow-up as indications  persist. No pleural effusion, or pneumothorax. No acute osseous process.    CT Chest  Groundglass opacities at both lung apices improved within  the interim with only nominal residual at this time. There is widespread  pulmonary fibrosis similar to the previous examination. No new airspace  disease or pleural effusion has developed.    Procedures Performed:         Consults:   Consults       Date and Time Order Name Status Description    9/27/2024  1:10 AM Inpatient Cardiology Consult Completed     9/6/2024  1:47 PM Inpatient Pulmonology Consult Completed              Discharge Disposition:  Home or Self Care    Discharge Medications:     Discharge Medications        Continue These Medications        Instructions Start Date   aspirin 81 MG EC tablet   81 mg, Oral, Daily      atorvastatin 10 MG tablet  Commonly known as: LIPITOR   10 mg, Oral, Daily      carbidopa-levodopa CR  MG per CR tablet  Commonly known as: SINEMET CR   2 tablets, Oral, Daily      cholecalciferol 1.25 MG (22155 UT) capsule  Commonly known as: VITAMIN D3   5,000 Units, Oral, Daily      docusate sodium 100 MG capsule  Commonly known as: COLACE   100 mg, Oral, Daily      donepezil 10 MG tablet  Commonly known as: ARICEPT   10 mg, Oral, Daily      fluticasone 50 MCG/ACT nasal spray  Commonly known as: FLONASE   2 sprays, Nasal, Daily      guaiFENesin 600 MG 12 hr  tablet  Commonly known as: MUCINEX   600 mg, Oral, 2 Times Daily      icosapent ethyl 1 g capsule capsule  Commonly known as: VASCEPA   2 g, Oral, Daily      memantine 10 MG tablet  Commonly known as: NAMENDA   10 mg, Oral, Daily      modafinil 100 MG tablet  Commonly known as: PROVIGIL   400 mg, Oral, Daily      potassium chloride 20 MEQ CR tablet  Commonly known as: KLOR-CON M20   20 mEq, Oral, Daily      potassium chloride 20 MEQ CR tablet  Commonly known as: KLOR-CON M20   20 mEq, Oral, 2 Times Daily With Meals      predniSONE 5 MG tablet  Commonly known as: DELTASONE   5 mg, Oral, Daily, Take 12.5 MG daily      probenecid 500 MG tablet  Commonly known as: BENEMID   500 mg, Oral, 2 Times Daily      tamsulosin 0.4 MG capsule 24 hr capsule  Commonly known as: FLOMAX   1 capsule, Oral, 2 Times Daily      torsemide 20 MG tablet  Commonly known as: DEMADEX   20 mg, Oral, Daily               Discharge Diet:   Diet Instructions       Diet: Cardiac Diets; Healthy Heart (2-3 Na+); Thin (IDDSI 0)      Discharge Diet: Cardiac Diets    Cardiac Diet: Healthy Heart (2-3 Na+)    Fluid Consistency: Thin (IDDSI 0)            Activity at Discharge:   Activity Instructions       Activity as Tolerated              Follow-up Appointments:   Follow-up Information       Ingris Nguyen MD Follow up.    Specialty: Internal Medicine  Contact information:  [address] [place] [address] [address] Barton [address] [place] [id number] [phone]                             Test Results Pending at Discharge:  Pending Labs       Order Current Status    Blood Culture - Blood, Arm, Left In process    Blood Culture - Blood, Arm, Right In process             Que Valle MD  09/27/24  14:26 EDT    Time Spent on Discharge Activities: >30 minutes    Dictated portions using Dragon dictation software.

## 2024-09-27 NOTE — PLAN OF CARE
Goal Outcome Evaluation:              Outcome Evaluation: vss. pt denies chest pain. spouse at bedside. IVF at 100/hr. SCDs and accumax pump applied. pt confused to time and place. Placed on 2L at night because his cpap is at home

## 2024-09-27 NOTE — CASE MANAGEMENT/SOCIAL WORK
Case Management Discharge Note      Final Note: Home with [facility], private caregivers, family to transport home         Selected Continued Care - Discharged on 9/27/2024 Admission date: 9/26/2024 - Discharge disposition: Home or Self Care      Destination    No services have been selected for the patient.                Durable Medical Equipment    No services have been selected for the patient.                Dialysis/Infusion    No services have been selected for the patient.                Home Medical Care Coordination complete.      Service Provider Selected Services Address Phone Fax Patient Preferred     [facility] [address] [place] [id number] [phone] --              Therapy    No services have been selected for the patient.                Community Resources    No services have been selected for the patient.                Community & DME    No services have been selected for the patient.                    Transportation Services  Private: Car    Final Discharge Disposition Code: 06 - home with home health care

## 2024-09-27 NOTE — NURSING NOTE
09/27/24 1246   Wound Left posterior foot Pressure Injury   No placement date or time found.   Present on Original Admission: Yes  Side: Left  Orientation: posterior  Location: foot  Primary Wound Type: Pressure Injury   Dressing Appearance dry;intact   Base clean;moist;red   Periwound intact;dry   Edges open   Wound Length (cm) 1 cm   Wound Width (cm) 1 cm   Wound Surface Area (cm^2) 1 cm^2   Drainage Characteristics/Odor serosanguineous;tan   Drainage Amount small   Care, Wound cleansed with;sterile normal saline   Dressing Care dressing applied;dressing changed;hydrofiber;silver impregnated;silicone     WOCN consult: Wound left posterior upper foot. Patients wife states he is flat footed and walks on side of foot. Started as callous, Went to podiatrist 6 months ago and it was removed, states wound has greatly improved. Wound care performed. Recommendations and skin prevention protocols placed in epic.

## 2024-09-27 NOTE — CASE MANAGEMENT/SOCIAL WORK
Discharge Planning Assessment  [facility]     Patient Name: Allan Kruger  MRN: [id number]  Today's Date: 9/27/2024    Admit Date: 9/26/2024    Plan: Home with [facility], care givers, family to transport   Discharge Needs Assessment       Row Name 09/27/24 1425       Living Environment    People in Home spouse    Name(s) of People in Home Yaa Kruger    Current Living Arrangements home    Potentially Unsafe Housing Conditions none    In the past 12 months has the electric, gas, oil, or water company threatened to shut off services in your home? No    Primary Care Provided by spouse/significant other;other (see comments)  Private caregivers    Provides Primary Care For no one, unable/limited ability to care for self    Family Caregiver if Needed spouse;other (see comments)    Family Caregiver Names Yaa Kruger and caregivers    Quality of Family Relationships helpful;involved;supportive    Able to Return to Prior Arrangements yes       Resource/Environmental Concerns    Resource/Environmental Concerns none    Transportation Concerns none       Transportation Needs    In the past 12 months, has lack of transportation kept you from medical appointments or from getting medications? no    In the past 12 months, has lack of transportation kept you from meetings, work, or from getting things needed for daily living? No       Food Insecurity    Within the past 12 months, you worried that your food would run out before you got the money to buy more. Never true    Within the past 12 months, the food you bought just didn't last and you didn't have money to get more. Never true       Transition Planning    Patient/Family Anticipates Transition to home;home with family;home with help/services  Current with [facility]    Patient/Family Anticipated Services at Transition home health care    Transportation Anticipated family or friend will provide       Discharge Needs Assessment    Equipment Currently Used at Home commode;cane,  straight;wheelchair;walker, rolling;hospital bed    Concerns to be Addressed discharge planning    Anticipated Changes Related to Illness none    Equipment Needed After Discharge none                   Discharge Plan       Row Name 09/27/24 1430       Plan    Plan Home with [facility], care givers, family to transport    Plan Comments Met with pt at bedside. Permission obtained to speak to pt in front of his family. Introduced self and explained role of . Face sheet verified, PCP is Ingris Nguyen. Pt denies any difficulty paying for medications, and he obtains his medications from [facility].  Pt lives at  home with spouse, who participates in his care. Pt has private caregivers 5 days a week, 4 hours a day from [facility], that assist with his ADL's. Pt has at home a hospital bed, wc, BSC, walker. Spouse denies any further need for DME. Pt is current with [facility], and referral has been placed in Epic. Pt has been to a SNF, many years ago, spouse cannot remember which facility. Spouse stated that they intend for patient to return home. Upon discharge, family will transport. Explained that CCP would follow to assess for discharge needs.                  Continued Care and Services - Admitted Since 9/26/2024       Home Medical Care       Service Provider Request Status Selected Services Address Phone Fax Patient Preferred     [facility] Accepted N/A [address] [address] [phone] --                  Expected Discharge Date and Time       Expected Discharge Date Expected Discharge Time    Sep 27, 2024            Demographic Summary    No documentation.                  Functional Status    No documentation.                  Psychosocial    No documentation.                  Abuse/Neglect    No documentation.                  Legal    No documentation.                  Substance Abuse    No documentation.                  Patient Forms    No  documentation.                     Aura Pierre RN

## 2024-09-27 NOTE — CONSULTS
Date of Hospital Visit: 24  Encounter Provider: Daniel Davis MD  Place of Service: [facility] CARDIOLOGY  Patient Name: Allan Kruger  :1940  Referral Provider: Ingris Nguyen, *    Chief complaint   NSTEMI    History of Present Illness  Allan Kruger is a 83 yo man with hyperlipidemia, Parkinson;s disease, pulmonary fibrosis, CKD and dementia who presented with urinary frequency.  He was recently discharged from the hospital on 9-10-24 with cellulitis of the left lower extremity/foot and JASS.  He was continued on aspirin 81 mg and torsemide 20 mg was added.    On arrival to the ED, he was afebrile and vital signs were stable.  Work up revealed troponin's of 115->100->120, creatinine of 1.86, unremarkable urine.  Pending blood cultures.  Plain CT of the chest reveals improving bilateral opacities and widespread pulmonary fibrosis.  EKG shows sinus rhythm, no ischemia.    He denies any chest pain or SOB.        ECHO 24    Left ventricular systolic function is normal. Calculated left ventricular EF = 60.6%    Left ventricular wall thickness is consistent with mild concentric hypertrophy.    Left ventricular diastolic function is consistent with age.    Moderate aortic valve stenosis is present. Aortic valve area is 1.5 cm2.    Peak velocity of the flow distal to the aortic valve is 380.2 cm/s. Aortic valve maximum pressure gradient is 58 mmHg. Aortic valve mean pressure gradient is 30 mmHg. Aortic valve dimensionless index is 0.4     Past Medical History:   Diagnosis Date    Alzheimer disease     CKD (chronic kidney disease)     Hyperlipidemia     Interstitial lung disease     Parkinson disease     PMR (polymyalgia rheumatica)     Renal cancer        Past Surgical History:   Procedure Laterality Date    NEPHRECTOMY Right        Medications Prior to Admission   Medication Sig Dispense Refill Last Dose    aspirin 81 MG EC tablet Take 1 tablet by mouth Daily.    9/26/2024    atorvastatin (LIPITOR) 10 MG tablet Take 1 tablet by mouth Daily.   9/26/2024    carbidopa-levodopa CR (SINEMET CR)  MG per CR tablet Take 2 tablets by mouth Daily.   9/26/2024    cholecalciferol (VITAMIN D3) 1.25 MG (26897 UT) capsule Take 5,000 Units by mouth Daily.   9/26/2024    docusate sodium (COLACE) 100 MG capsule Take 1 capsule by mouth Daily.   9/26/2024    donepezil (ARICEPT) 10 MG tablet Take 1 tablet by mouth Daily.   9/26/2024    fluticasone (FLONASE) 50 MCG/ACT nasal spray Administer 2 sprays into the nostril(s) as directed by provider Daily.   9/26/2024    guaiFENesin (MUCINEX) 600 MG 12 hr tablet Take 1 tablet by mouth 2 (Two) Times a Day.   9/26/2024    icosapent ethyl (VASCEPA) 1 g capsule capsule Take 2 g by mouth Daily.   9/26/2024    memantine (NAMENDA) 10 MG tablet Take 1 tablet by mouth Daily.   9/26/2024    modafinil (PROVIGIL) 100 MG tablet Take 4 tablets by mouth Daily.   9/26/2024    potassium chloride (KLOR-CON M20) 20 MEQ CR tablet Take 1 tablet by mouth Daily. 30 tablet 0 9/26/2024    potassium chloride (KLOR-CON M20) 20 MEQ CR tablet Take 1 tablet by mouth 2 (Two) Times a Day With Meals. 60 tablet 0 9/26/2024    predniSONE (DELTASONE) 5 MG tablet Take 1 tablet by mouth Daily. Take 12.5 MG daily   9/26/2024    probenecid (BENEMID) 500 MG tablet Take 1 tablet by mouth 2 (Two) Times a Day.   9/26/2024    tamsulosin (FLOMAX) 0.4 MG capsule 24 hr capsule Take 1 capsule by mouth 2 (Two) Times a Day.   9/26/2024    torsemide (DEMADEX) 20 MG tablet Take 1 tablet by mouth Daily. 30 tablet 0 9/26/2024       Current Meds  Scheduled Meds:aspirin, 81 mg, Oral, Daily  atorvastatin, 80 mg, Oral, Daily  carbidopa-levodopa CR, 2 tablet, Oral, Daily  donepezil, 10 mg, Oral, Daily  fluticasone, 2 spray, Nasal, Daily  icosapent ethyl, 2 g, Oral, Daily  memantine, 10 mg, Oral, Daily  modafinil, 400 mg, Oral, Daily  probenecid, 500 mg, Oral, BID  tamsulosin, 0.4 mg, Oral,  "BID      Continuous Infusions:sodium chloride, 75 mL/hr, Last Rate: 100 mL/hr (09/26/24 2350)      PRN Meds:.  acetaminophen **OR** acetaminophen **OR** acetaminophen    aluminum-magnesium hydroxide-simethicone    senna-docusate sodium **AND** polyethylene glycol **AND** bisacodyl **AND** bisacodyl    melatonin    nitroglycerin    ondansetron ODT **OR** ondansetron    sodium chloride    Allergies as of 09/26/2024 - Reviewed 09/26/2024   Allergen Reaction Noted    Augmentin [amoxicillin-pot clavulanate] Rash 09/03/2024    Neosporin [bacitracin-polymyxin b] Rash 09/03/2024       Social History     Socioeconomic History    Marital status:    Tobacco Use    Smoking status: Unknown   Vaping Use    Vaping status: Never Used   Substance and Sexual Activity    Drug use: Never    Sexual activity: Defer       History reviewed. No pertinent family history.    REVIEW OF SYSTEMS:   12 point ROS was performed and is negative except as outlined in HPI          Objective:   Temp:  [96.8 °F (36 °C)-98.4 °F (36.9 °C)] 97.3 °F (36.3 °C)  Heart Rate:  [62-78] 68  Resp:  [15-18] 18  BP: (121-176)/() 152/71  Body mass index is 30.81 kg/m².  Flowsheet Rows      Flowsheet Row First Filed Value   Admission Height 188 cm (74\") Documented at 09/26/2024 1410   Admission Weight 109 kg (240 lb) Documented at 09/26/2024 1410          Vitals:    09/27/24 0746   BP: 152/71   Pulse: 68   Resp: 18   Temp: 97.3 °F (36.3 °C)   SpO2: 98%       GEN: no distress, alert and oriented  HEENT: NACT, EOMI, moist mucous membranes  Lungs: CTAB, no wheezes, rales or rhonchi  CV: normal rate, regular rhythm, normal S1, S2, no murmurs, +2 radial pulses b/l, no carotid bruit  Abdomen: soft, nontender, nondistended, NABS  Extremities: no edema  Skin: no rash, warm, dry  Heme/Lymph: no bruising  Psych: organized thought, normal behavior and affect      Lab Review:   Results from last 7 days   Lab Units 09/27/24  0353   SODIUM mmol/L 138   POTASSIUM mmol/L " 4.1   CHLORIDE mmol/L 102   CO2 mmol/L 20.3*   BUN mg/dL 28*   CREATININE mg/dL 1.75*   CALCIUM mg/dL 9.3   BILIRUBIN mg/dL 0.3   ALK PHOS U/L 80   ALT (SGPT) U/L 8   AST (SGOT) U/L 15   GLUCOSE mg/dL 99     Results from last 7 days   Lab Units 09/27/24  0353 09/26/24  1859 09/26/24  1636   HSTROP T ng/L 120* 100* 115*     Results from last 7 days   Lab Units 09/27/24  0353 09/26/24  1636   WBC 10*3/mm3 8.07 10.35   HEMOGLOBIN g/dL 11.2* 11.0*   HEMATOCRIT % 35.8* 36.2*   PLATELETS 10*3/mm3 196 221               9-26-24      I personally viewed and interpreted the patient's EKG/Telemetry data)      NSTEMI (non-ST elevated myocardial infarction)    PMR (polymyalgia rheumatica)    Current chronic use of systemic steroids    Stage 3b chronic kidney disease    Dementia without behavioral disturbance    Rheumatoid arthritis with negative rheumatoid factor    Parkinson disease    Pulmonary fibrosis    Assessment and Plan:  #Urinary frequency  #CKD  #Elevated troponin  #Dementia  #pulm fibrosis    83 yo man with hyperlipidemia, Parkinson;s disease, pulmonary fibrosis, CKD and dementia who presented with urinary frequency, found to have elevated but flat troponins in the setting of CKD.    No cardiac complaints, EKG benign. Troponins likely elevated due to demand ischemia and decreased clearance given CKD. No further cardiac workup indicated at this time. Discussed with family at bedside and daughter on the phone.    Daniel Diehl MD, [facility], [facility]  09/27/24  09:29 EDT.

## 2024-09-27 NOTE — H&P
Patient Name:  Allan Kruger  YOB: 1940  MRN:  [id number]  Admit Date:  9/26/2024  Patient Care Team:  Ingris Nguyen MD as PCP - General (Internal Medicine)      Subjective   History Present Illness     Chief Complaint   Patient presents with    Fatigue    Urinary Frequency       Mr. Kruger is a 84 y.o. with a history of dementia, CKD, Parkinson's, PMR, ILD, chronic left lower extremity wound who presents to [facility] complaining of urinary frequency and increased confusion/drowsiness.  He is not the best historian due to dementia but he was answering simple questions appropriately.  He denied headache.  No chest pain or palpitations reported.  Not reporting shortness of breath.  He was on 2 L when I saw him but he uses CPAP at home at night and they did not bring that in.  Not reporting any nausea vomiting diarrhea or abdominal pain.  He is not reporting any dysuria.  Family had noticed he had reported some urinary frequency previously.  Discussed with his wife at bedside and she reports that he was taken to the emergency room to check his urine.  She was upset at being admitted and was asking about cardiology evaluation.  Discussed with his daughter over the phone who is a physician in [place].  Obtained additional history from her with recent admission here for sepsis of uncertain etiology and chronic left foot wound.  She reports that he had improved on antibiotics and they were concerned that he might be developing an infection again with his increased drowsiness.  We discussed the troponin levels and cardiology consultation.  She was aware of the difficulty with his dementia.    Review of Systems   Constitutional:  Negative for chills and fever.   HENT:  Negative for sore throat and trouble swallowing.    Cardiovascular:  Negative for chest pain and palpitations.   Gastrointestinal:  Negative for diarrhea, nausea and vomiting.   Endocrine: Negative for cold  intolerance and heat intolerance.   Genitourinary:  Negative for dysuria and flank pain.   Musculoskeletal:  Negative for neck pain.        Personal History     Past Medical History:   Diagnosis Date    Alzheimer disease     CKD (chronic kidney disease)     Hyperlipidemia     Interstitial lung disease     Parkinson disease     PMR (polymyalgia rheumatica)     Renal cancer      Past Surgical History:   Procedure Laterality Date    NEPHRECTOMY Right      History reviewed. No pertinent family history.  Social History     Tobacco Use    Smoking status: Unknown   Vaping Use    Vaping status: Never Used   Substance Use Topics    Drug use: Never     No current facility-administered medications on file prior to encounter.     Current Outpatient Medications on File Prior to Encounter   Medication Sig Dispense Refill    aspirin 81 MG EC tablet Take 1 tablet by mouth Daily.      atorvastatin (LIPITOR) 10 MG tablet Take 1 tablet by mouth Daily.      carbidopa-levodopa CR (SINEMET CR)  MG per CR tablet Take 2 tablets by mouth Daily.      cholecalciferol (VITAMIN D3) 1.25 MG (21694 UT) capsule Take 5,000 Units by mouth Daily.      docusate sodium (COLACE) 100 MG capsule Take 1 capsule by mouth Daily.      donepezil (ARICEPT) 10 MG tablet Take 1 tablet by mouth Daily.      fluticasone (FLONASE) 50 MCG/ACT nasal spray Administer 2 sprays into the nostril(s) as directed by provider Daily.      guaiFENesin (MUCINEX) 600 MG 12 hr tablet Take 1 tablet by mouth 2 (Two) Times a Day.      icosapent ethyl (VASCEPA) 1 g capsule capsule Take 2 g by mouth Daily.      memantine (NAMENDA) 10 MG tablet Take 1 tablet by mouth Daily.      modafinil (PROVIGIL) 100 MG tablet Take 4 tablets by mouth Daily.      potassium chloride (KLOR-CON M20) 20 MEQ CR tablet Take 1 tablet by mouth Daily. 30 tablet 0    potassium chloride (KLOR-CON M20) 20 MEQ CR tablet Take 1 tablet by mouth 2 (Two) Times a Day With Meals. 60 tablet 0    predniSONE  (DELTASONE) 5 MG tablet Take 1 tablet by mouth Daily. Take 12.5 MG daily      probenecid (BENEMID) 500 MG tablet Take 1 tablet by mouth 2 (Two) Times a Day.      tamsulosin (FLOMAX) 0.4 MG capsule 24 hr capsule Take 1 capsule by mouth 2 (Two) Times a Day.      torsemide (DEMADEX) 20 MG tablet Take 1 tablet by mouth Daily. 30 tablet 0     Allergies   Allergen Reactions    Augmentin [Amoxicillin-Pot Clavulanate] Rash    Neosporin [Bacitracin-Polymyxin B] Rash       Objective    Objective     Vital Signs  Temp:  [96.8 °F (36 °C)-98.4 °F (36.9 °C)] 97.3 °F (36.3 °C)  Heart Rate:  [62-78] 68  Resp:  [15-18] 18  BP: (121-176)/() 152/71  SpO2:  [68 %-100 %] 98 %  on  Flow (L/min):  [1-2] 1;   Device (Oxygen Therapy): nasal cannula  Body mass index is 30.81 kg/m².    Physical Exam  Vitals and nursing note reviewed.   Constitutional:       General: He is not in acute distress.     Appearance: He is not diaphoretic.   HENT:      Head: Atraumatic.   Eyes:      Conjunctiva/sclera: Conjunctivae normal.   Cardiovascular:      Rate and Rhythm: Normal rate and regular rhythm.      Pulses: Normal pulses.   Pulmonary:      Effort: Pulmonary effort is normal.      Breath sounds: No wheezing.   Abdominal:      General: There is no distension.      Palpations: Abdomen is soft.      Tenderness: There is no abdominal tenderness. There is no guarding or rebound.   Musculoskeletal:         General: No tenderness.   Skin:     General: Skin is dry.   Neurological:      Mental Status: He is alert.      Cranial Nerves: No cranial nerve deficit.   Psychiatric:         Mood and Affect: Mood normal.         Behavior: Behavior normal.         Cognition and Memory: Memory is impaired.         Results Review:  I reviewed the patient's new clinical results.  I reviewed the patient's new imaging results and agree with the interpretation.  I personally viewed and interpreted the patient's EKG/Telemetry data  I reviewed prior records.    Lab  Results (last 24 hours)       Procedure Component Value Units Date/Time    Urinalysis without microscopic (no culture) - Urine, Clean Catch [[id number]]  (Abnormal) Collected: 09/26/24 1520    Specimen: Urine, Clean Catch Updated: 09/26/24 1523     Color, UA Yellow     Appearance, UA Clear     pH, UA 5.5     Specific Gravity, UA 1.010     Glucose, UA Negative     Ketones, UA Negative     Bilirubin, UA Negative     Blood, UA Trace     Protein, UA Negative     Leuk Esterase, UA Negative     Nitrite, UA Negative     Urobilinogen, UA 0.2 E.U./dL    CBC & Differential [[id number]]  (Abnormal) Collected: 09/26/24 1636    Specimen: Blood Updated: 09/26/24 1648    Narrative:      The following orders were created for panel order CBC & Differential.  Procedure                               Abnormality         Status                     ---------                               -----------         ------                     CBC Auto Differential[[id number]]        Abnormal            Final result                 Please view results for these tests on the individual orders.    Comprehensive Metabolic Panel [[id number]]  (Abnormal) Collected: 09/26/24 1636    Specimen: Blood Updated: 09/26/24 1706     Glucose 133 mg/dL      BUN 36 mg/dL      Creatinine 1.86 mg/dL      Sodium 138 mmol/L      Potassium 4.6 mmol/L      Chloride 101 mmol/L      CO2 25.7 mmol/L      Calcium 9.9 mg/dL      Total Protein 7.9 g/dL      Albumin 4.0 g/dL      ALT (SGPT) <5 U/L      AST (SGOT) 14 U/L      Alkaline Phosphatase 97 U/L      Total Bilirubin 0.2 mg/dL      Globulin 3.9 gm/dL      A/G Ratio 1.0 g/dL      BUN/Creatinine Ratio 19.4     Anion Gap 11.3 mmol/L      eGFR 34.8 mL/min/1.73     Narrative:      GFR Normal >60  Chronic Kidney Disease <60  Kidney Failure <15    The GFR formula is only valid for adults with stable renal function between ages 18 and 70.    CBC Auto Differential [[id number]]  (Abnormal) Collected: 09/26/24 1636    Specimen: Blood  Updated: 09/26/24 1648     WBC 10.35 10*3/mm3      RBC 3.86 10*6/mm3      Hemoglobin 11.0 g/dL      Hematocrit 36.2 %      MCV 93.8 fL      MCH 28.5 pg      MCHC 30.4 g/dL      RDW 15.3 %      RDW-SD 52.9 fl      MPV 9.5 fL      Platelets 221 10*3/mm3      Neutrophil % 73.7 %      Lymphocyte % 18.6 %      Monocyte % 6.3 %      Eosinophil % 0.2 %      Basophil % 0.2 %      Immature Grans % 1.0 %      Neutrophils, Absolute 7.63 10*3/mm3      Lymphocytes, Absolute 1.93 10*3/mm3      Monocytes, Absolute 0.65 10*3/mm3      Eosinophils, Absolute 0.02 10*3/mm3      Basophils, Absolute 0.02 10*3/mm3      Immature Grans, Absolute 0.10 10*3/mm3     High Sensitivity Troponin T [[id number]]  (Abnormal) Collected: 09/26/24 1636    Specimen: Blood Updated: 09/26/24 1716     HS Troponin T 115 ng/L     Narrative:      High Sensitive Troponin T Reference Range:  <14.0 ng/L- Negative Female for AMI  <22.0 ng/L- Negative Male for AMI  >=14 - Abnormal Female indicating possible myocardial injury.  >=22 - Abnormal Male indicating possible myocardial injury.   Clinicians would have to utilize clinical acumen, EKG, Troponin, and serial changes to determine if it is an Acute Myocardial Infarction or myocardial injury due to an underlying chronic condition.         BNP [[id number]]  (Normal) Collected: 09/26/24 1636    Specimen: Blood Updated: 09/26/24 1713     proBNP 1,125.0 pg/mL     Narrative:      This assay is used as an aid in the diagnosis of individuals suspected of having heart failure. It can be used as an aid in the diagnosis of acute decompensated heart failure (ADHF) in patients presenting with signs and symptoms of ADHF to the emergency department (ED). In addition, NT-proBNP of <300 pg/mL indicates ADHF is not likely.    Age Range Result Interpretation  NT-proBNP Concentration (pg/mL:      <50             Positive            >450                   Gray                 300-450                    Negative              <300    50-75           Positive            >900                  Gray                300-900                  Negative            <300      >75             Positive            >1800                  Gray                300-1800                  Negative            <300    Blood Culture - Blood, Arm, Right [[id number]] Collected: 09/26/24 1636    Specimen: Blood from Arm, Right Updated: 09/26/24 1643    Lactic Acid, Plasma [[id number]]  (Normal) Collected: 09/26/24 1636    Specimen: Blood Updated: 09/26/24 1706     Lactate 1.5 mmol/L     Blood Culture - Blood, Arm, Left [[id number]] Collected: 09/26/24 1732    Specimen: Blood from Arm, Left Updated: 09/26/24 1735    High Sensitivity Troponin T 2Hr [[id number]]  (Abnormal) Collected: 09/26/24 1859    Specimen: Blood Updated: 09/26/24 1928     HS Troponin T 100 ng/L      Troponin T Delta -15 ng/L     Narrative:      High Sensitive Troponin T Reference Range:  <14.0 ng/L- Negative Female for AMI  <22.0 ng/L- Negative Male for AMI  >=14 - Abnormal Female indicating possible myocardial injury.  >=22 - Abnormal Male indicating possible myocardial injury.   Clinicians would have to utilize clinical acumen, EKG, Troponin, and serial changes to determine if it is an Acute Myocardial Infarction or myocardial injury due to an underlying chronic condition.         High Sensitivity Troponin T [[id number]]  (Abnormal) Collected: 09/27/24 0353    Specimen: Blood Updated: 09/27/24 0452     HS Troponin T 120 ng/L     Narrative:      High Sensitive Troponin T Reference Range:  <14.0 ng/L- Negative Female for AMI  <22.0 ng/L- Negative Male for AMI  >=14 - Abnormal Female indicating possible myocardial injury.  >=22 - Abnormal Male indicating possible myocardial injury.   Clinicians would have to utilize clinical acumen, EKG, Troponin, and serial changes to determine if it is an Acute Myocardial Infarction or myocardial injury due to an underlying chronic condition.         CBC (No Diff)  [[id number]]  (Abnormal) Collected: 09/27/24 0353    Specimen: Blood Updated: 09/27/24 0416     WBC 8.07 10*3/mm3      RBC 3.93 10*6/mm3      Hemoglobin 11.2 g/dL      Hematocrit 35.8 %      MCV 91.1 fL      MCH 28.5 pg      MCHC 31.3 g/dL      RDW 14.8 %      RDW-SD 50.1 fl      MPV 9.0 fL      Platelets 196 10*3/mm3     Comprehensive Metabolic Panel [[id number]]  (Abnormal) Collected: 09/27/24 0353    Specimen: Blood Updated: 09/27/24 0453     Glucose 99 mg/dL      BUN 28 mg/dL      Creatinine 1.75 mg/dL      Sodium 138 mmol/L      Potassium 4.1 mmol/L      Comment: Slight hemolysis detected by analyzer. Result may be falsely elevated.        Chloride 102 mmol/L      CO2 20.3 mmol/L      Calcium 9.3 mg/dL      Total Protein 7.1 g/dL      Albumin 3.5 g/dL      ALT (SGPT) 8 U/L      AST (SGOT) 15 U/L      Comment: Slight hemolysis detected by analyzer. Result may be falsely elevated.        Alkaline Phosphatase 80 U/L      Total Bilirubin 0.3 mg/dL      Globulin 3.6 gm/dL      A/G Ratio 1.0 g/dL      BUN/Creatinine Ratio 16.0     Anion Gap 15.7 mmol/L      eGFR 37.4 mL/min/1.73     Narrative:      GFR Normal >60  Chronic Kidney Disease <60  Kidney Failure <15    The GFR formula is only valid for adults with stable renal function between ages 18 and 70.    Lipid Panel [[id number]]  (Abnormal) Collected: 09/27/24 0353    Specimen: Blood Updated: 09/27/24 0900     Total Cholesterol 143 mg/dL      Triglycerides 228 mg/dL      HDL Cholesterol 30 mg/dL      LDL Cholesterol  75 mg/dL      VLDL Cholesterol 38 mg/dL      LDL/HDL Ratio 2.25    Narrative:      Cholesterol Reference Ranges  (U.S. Department of Health and Human Services ATP III Classifications)    Desirable          <200 mg/dL  Borderline High    200-239 mg/dL  High Risk          >240 mg/dL      Triglyceride Reference Ranges  (U.S. Department of Health and Human Services ATP III Classifications)    Normal           <150 mg/dL  Borderline High  150-199 mg/dL  High              200-499 mg/dL  Very High        >500 mg/dL    HDL Reference Ranges  (U.S. Department of Health and Human Services ATP III Classifications)    Low     <40 mg/dl (major risk factor for CHD)  High    >60 mg/dl ('negative' risk factor for CHD)        LDL Reference Ranges  (U.S. Department of Health and Human Services ATP III Classifications)    Optimal          <100 mg/dL  Near Optimal     100-129 mg/dL  Borderline High  130-159 mg/dL  High             160-189 mg/dL  Very High        >189 mg/dL    Hemoglobin A1c [[id number]]  (Abnormal) Collected: 09/27/24 0353    Specimen: Blood Updated: 09/27/24 0856     Hemoglobin A1C 7.00 %     Narrative:      Hemoglobin A1C Ranges:    Increased Risk for Diabetes  5.7% to 6.4%  Diabetes                     >= 6.5%  Diabetic Goal                < 7.0%            Imaging Results (Last 24 Hours)       Procedure Component Value Units Date/Time    CT Chest Without Contrast Diagnostic [[id number]] Collected: 09/26/24 1704     Updated: 09/26/24 1711    Narrative:      CT CHEST WO CONTRAST DIAGNOSTIC-     Radiation dose reduction techniques were utilized, including automated  exposure control and exposure modulation based on body size.     Clinical: Pulmonary fibrosis, fatigue and weakness     COMPARISON 9/6/2024     FINDINGS: Cardiac enlargement similar to the previous examination. There  is coronary artery calcification. The esophagus is satisfactory in  course and caliber. There is atherosclerotic calcification of a normal  diameter aorta. There are small calcified and noncalcified mediastinal  and hilar lymph nodes again demonstrated. Biapical areas of groundglass  opacity considerably improved within the interim with only nominal  residual at this time. Pulmonary fibrosis demonstrated bilaterally.  There is bronchiectasis again demonstrated. No suspicious pulmonary  lesion or acute airspace disease has developed. No pleural effusion  seen. Limited images through the upper  abdomen are unremarkable.     CONCLUSION: Groundglass opacities at both lung apices improved within  the interim with only nominal residual at this time. There is widespread  pulmonary fibrosis similar to the previous examination. No new airspace  disease or pleural effusion has developed.              This report was finalized on 9/26/2024 5:07 PM by Dr. Peter Parra M.D  on Workstation: [id number]       XR Chest 1 View [[id number]] Collected: 09/26/24 1511     Updated: 09/26/24 1518    Narrative:      XR CHEST 1 VW-     HISTORY: Male who is 84 years-old, weakness     TECHNIQUE: Frontal view of the chest     COMPARISON: 9/2/2024     FINDINGS: The heart size is borderline. Patchy bilateral pulmonary  opacities, that may represent a combination of chronic interstitial  disease and superimposed pneumonitis, appears mildly decreased on the  left, in comparison with the prior exam, follow-up as indications  persist. No pleural effusion, or pneumothorax. No acute osseous process.       Impression:      As described.     This report was finalized on 9/26/2024 3:15 PM by Dr. Jose Manuel Fox M.D on Workstation: [id number]               Results for orders placed during the hospital encounter of 09/02/24    Adult Transthoracic Echo Complete W/ Cont if Necessary Per Protocol    Interpretation Summary    Left ventricular systolic function is normal. Calculated left ventricular EF = 60.6%    Left ventricular wall thickness is consistent with mild concentric hypertrophy.    Left ventricular diastolic function is consistent with age.    Moderate aortic valve stenosis is present. Aortic valve area is 1.5 cm2.    Peak velocity of the flow distal to the aortic valve is 380.2 cm/s. Aortic valve maximum pressure gradient is 58 mmHg. Aortic valve mean pressure gradient is 30 mmHg. Aortic valve dimensionless index is 0.4 .      ECG 12 Lead   ED Interpretation   Abnormal   Denny Morejon MD     9/26/2024  5:51 PM   ECG 12 Lead          Date/Time: 9/26/2024 4:19 PM      Performed by: Denny Morejon MD   Authorized by: Denny Morejon MD  Interpreted by ED physician   Rhythm: sinus rhythm   Rate: normal   BPM: 67   QRS axis: left   Conduction: 1st degree   ST Segments: ST segments normal   T Waves: T waves normal   Other findings: LVH   Clinical impression: abnormal ECG      ECG 12 Lead Altered Mental Status   Final Result   HEART RATE=67  bpm   RR Bwthtkcc=381  ms   NY Hzuhbura=059  ms   P Horizontal Axis=-2  deg   P Front Axis=-10  deg   QRSD Kpaduran=350  ms   QT Zqwsostn=507  ms   MRfS=359  ms   QRS Axis=-48  deg   T Wave Axis=85  deg   - ABNORMAL ECG -   Sinus rhythm   Borderline  prolonged NY interval   Probable  left atrial enlargement   LVH with IVCD, LAD and secondary repol abnrm   No Prior Tracing for Comparison   Electronically Signed By: Victorino Walsh ([facility]) 2024-09-26 19:55:43   Date and Time of Study:2024-09-26 14:31:20      Telemetry Scan   Final Result      Telemetry Scan   Final Result      Telemetry Scan   Final Result      ECG Scan   Final Result           Assessment/Plan     Active Hospital Problems    Diagnosis  POA    **NSTEMI (non-ST elevated myocardial infarction) [I21.4]  Yes    Dementia without behavioral disturbance [F03.90]  Yes    Parkinson disease [G20.A1]  Yes    PMR (polymyalgia rheumatica) [M35.3]  Yes    Pulmonary fibrosis [J84.10]  Yes    Rheumatoid arthritis with negative rheumatoid factor [M06.00]  Yes    Stage 3b chronic kidney disease [N18.32]  Yes    Current chronic use of systemic steroids [Z79.52]  Not Applicable      Resolved Hospital Problems   No resolved problems to display.       Mr. Kruger is a 84 y.o.     NSTEMI: Patient is not reporting any chest pressure pain.  He has elevated troponin.  He does have chronic CKD and that appears near baseline.  He was stable on telemetry.  I could not bring up the EKG from the outside ER for review although there are no ST changes reported in the report.  Will  repeat an EKG . aspirin statin.  Heart rate is in the 60s.  Cardiology consulted.  Dementia/Parkinson: Reordering home regimen.  Will monitor.  He was alert and cooperative.  PMR/RA: Will continue his chronic steroid.  Can check inflammatory markers.  Pulmonary fibrosis/LOVE: Can use his home machine if it is brought in . O2 as needed.  Did not hear any acute bronchospasm on exam.  CKD 3B: Near baseline.  He is receiving some fluids now while n.p.o. pending the cardiac workup.  Torsemide held for now.  Will monitor.  Chronic foot wound: Wound care consult  Elevated A1c: I checked an A1c as part of the cardiac workup.  A1c of 7.0.  He is on chronic steroids for PMR.  Will start low-dose SSI  PPx: SCD  I discussed the patient's findings and my recommendations with patient and family.      Que Valle MD  [facility]  09/27/24  11:29 EDT    Dictated portions of note using dragon dictation software.

## 2024-09-27 NOTE — DISCHARGE PLACEMENT REQUEST
"Allan Kruger (84 y.o. Male)       Date of Birth   1940    Social Security Number       Address   [address] [place] [address] [place] [address]    Home Phone   [phone]    MRN   [id number]       Pentecostalism   None    Marital Status                               Admission Date   9/26/24    Admission Type   Urgent    Admitting Provider   Que Valle MD    Attending Provider   Que Valle MD    Department, Room/Bed   [address], [address]       Discharge Date       Discharge Disposition       Discharge Destination                                 Attending Provider: Que Valle MD    Allergies: Augmentin [Amoxicillin-pot Clavulanate], Neosporin [Bacitracin-polymyxin B]    Isolation: None   Infection: None   Code Status: CPR    Ht: 188 cm (74\")   Wt: 109 kg (240 lb)    Admission Cmt: None   Principal Problem: NSTEMI (non-ST elevated myocardial infarction) [I21.4]                   Active Insurance as of 9/26/2024       Primary Coverage       Payor Plan Insurance Group Employer/Plan Group    ANTH MEDICARE REPLACEMENT ANTH MEDICARE ADVANTAGE [id number]       Payor Plan Address Payor Plan Phone Number Payor Plan Fax Number Effective Dates    [address] [phone]  6/1/2024 - None Entered    [facility] [id number]         Subscriber Name Subscriber Birth Date Member ID       ALLAN KRUGER 1940 [id number]                     Emergency Contacts        (Rel.) Home Phone Work Phone Mobile Phone    Allan Zuñiga III (Son) -- -- [phone]    Dolores Hewitt (Relative) -- -- [phone]    Ashlee Mckeon (Care Giver) -- -- [phone]                "

## 2024-09-30 ENCOUNTER — READMISSION MANAGEMENT (OUTPATIENT)
Dept: CALL CENTER | Facility: HOSPITAL | Age: 84
End: 2024-09-30
Payer: MEDICARE

## 2024-09-30 NOTE — OUTREACH NOTE
Medical Week 1 Survey      Flowsheet Row Responses   [facility] patient discharged from? [facility]   Does the patient have one of the following disease processes/diagnoses(primary or secondary)? Other   Week 1 attempt successful? No   Unsuccessful attempts Attempt 1  [spoke with son who defers calls to CHAYA Rosado--no answer at time of call]            ZEUS JACKSON - Registered Nurse

## 2024-10-01 ENCOUNTER — HOME CARE VISIT (OUTPATIENT)
Dept: HOME HEALTH SERVICES | Facility: HOME HEALTHCARE | Age: 84
End: 2024-10-01
Payer: COMMERCIAL

## 2024-10-01 VITALS
RESPIRATION RATE: 16 BRPM | DIASTOLIC BLOOD PRESSURE: 66 MMHG | OXYGEN SATURATION: 96 % | HEART RATE: 71 BPM | SYSTOLIC BLOOD PRESSURE: 114 MMHG | TEMPERATURE: 97.1 F

## 2024-10-01 VITALS
TEMPERATURE: 97.6 F | OXYGEN SATURATION: 97 % | BODY MASS INDEX: 30.81 KG/M2 | RESPIRATION RATE: 18 BRPM | DIASTOLIC BLOOD PRESSURE: 70 MMHG | WEIGHT: 240 LBS | SYSTOLIC BLOOD PRESSURE: 140 MMHG | HEART RATE: 72 BPM

## 2024-10-01 LAB
BACTERIA SPEC AEROBE CULT: NORMAL
BACTERIA SPEC AEROBE CULT: NORMAL

## 2024-10-01 PROCEDURE — G0151 HHCP-SERV OF PT,EA 15 MIN: HCPCS

## 2024-10-01 PROCEDURE — G0299 HHS/HOSPICE OF RN EA 15 MIN: HCPCS

## 2024-10-02 ENCOUNTER — HOME CARE VISIT (OUTPATIENT)
Dept: HOME HEALTH SERVICES | Facility: HOME HEALTHCARE | Age: 84
End: 2024-10-02
Payer: COMMERCIAL

## 2024-10-03 ENCOUNTER — HOME CARE VISIT (OUTPATIENT)
Dept: HOME HEALTH SERVICES | Facility: HOME HEALTHCARE | Age: 84
End: 2024-10-03
Payer: COMMERCIAL

## 2024-10-03 ENCOUNTER — READMISSION MANAGEMENT (OUTPATIENT)
Dept: CALL CENTER | Facility: HOSPITAL | Age: 84
End: 2024-10-03
Payer: MEDICARE

## 2024-10-03 VITALS
SYSTOLIC BLOOD PRESSURE: 110 MMHG | TEMPERATURE: 97 F | RESPIRATION RATE: 16 BRPM | OXYGEN SATURATION: 96 % | DIASTOLIC BLOOD PRESSURE: 64 MMHG | HEART RATE: 72 BPM

## 2024-10-03 PROCEDURE — G0151 HHCP-SERV OF PT,EA 15 MIN: HCPCS

## 2024-10-03 NOTE — OUTREACH NOTE
Medical Week 1 Survey      Flowsheet Row Responses   [facility] patient discharged from? [place]   Does the patient have one of the following disease processes/diagnoses(primary or secondary)? Other   Week 1 attempt successful? Yes   Call start time 1217   Call end time 1225   Is patient permission given to speak with other caregiver? Yes   Person spoke with today (if not patient) and relationship Ashlee-caregiver.   Meds reviewed with patient/caregiver? Yes   Is the patient having any side effects they believe may be caused by any medication additions or changes? No   Does the patient have all medications ordered at discharge? N/A   Is the patient taking all medications as directed (includes completed medication regime)? Yes   Comments regarding appointments Kept appt with nephrologist. Ashlee states patient will have blood work done per PCP next week.   Does the patient have a primary care provider?  Yes   Does the patient have an appointment with their PCP within 7 days of discharge? No   What is preventing the patient from scheduling follow up appointments within 7 days of discharge? --  [Ashlee states has been in frequent contact with PCP.]   Has the patient kept scheduled appointments due by today? Yes   Has home health visited the patient within 72 hours of discharge? Yes   Home health comments  nurse has been visiting, and will be monitoring wound left foot.   Has all DME been delivered? Yes   Psychosocial issues? No   Did the patient receive a copy of their discharge instructions? Yes   Nursing interventions Reviewed instructions with patient   What is the patient's perception of their health status since discharge? Improving   Is the patient/caregiver able to teach back signs and symptoms related to disease process for when to call PCP? Yes   Is the patient/caregiver able to teach back signs and symptoms related to disease process for when to call 911? Yes   Is the patient/caregiver able to  teach back the hierarchy of who to call/visit for symptoms/problems? PCP, Specialist, Home health nurse, Urgent Care, ED, 911 Yes   If the patient is a current smoker, are they able to teach back resources for cessation? Not a smoker   Additional teach back comments Caregiver states aware of s/s of infection.   Week 1 call completed? Yes   Graduated Yes   Would this patient benefit from a Referral to [facility]? No   Is the patient interested in additional calls from an ambulatory ? No   Wrap up additional comments Ashlee, caregiver, states patient is improving. States monitoring left foot wound with no s/s of infection noted. HH continues to visit. Denies any needs or concerns today.   Call end time 1225            Lety CASTRO - Registered Nurse

## 2024-10-07 NOTE — CASE COMMUNICATION
"THE FOLLOWING SUPPLIES WERE ORDERED 10/2/24 THROUGH [employer]:    JAMEEL - 3  BASIC FOAM 2X2 - 1 BOX  KERLIX 4\" ROLLS - 14  NON STERILE WOVEN GAUZE 2X2 - 1 PACK  SKIN PREP WIPES - NOT COVERED BY INSURANCE"

## 2024-10-10 ENCOUNTER — HOME CARE VISIT (OUTPATIENT)
Dept: HOME HEALTH SERVICES | Facility: HOME HEALTHCARE | Age: 84
End: 2024-10-10
Payer: COMMERCIAL

## 2024-10-10 PROCEDURE — G0299 HHS/HOSPICE OF RN EA 15 MIN: HCPCS

## 2024-10-11 VITALS
RESPIRATION RATE: 16 BRPM | SYSTOLIC BLOOD PRESSURE: 120 MMHG | HEART RATE: 51 BPM | TEMPERATURE: 97.3 F | DIASTOLIC BLOOD PRESSURE: 60 MMHG | OXYGEN SATURATION: 95 %

## 2024-10-16 ENCOUNTER — HOME CARE VISIT (OUTPATIENT)
Dept: HOME HEALTH SERVICES | Facility: HOME HEALTHCARE | Age: 84
End: 2024-10-16
Payer: COMMERCIAL

## 2024-10-16 ENCOUNTER — LAB REQUISITION (OUTPATIENT)
Dept: LAB | Facility: HOSPITAL | Age: 84
End: 2024-10-16
Payer: MEDICARE

## 2024-10-16 DIAGNOSIS — E86.0 DEHYDRATION: ICD-10-CM

## 2024-10-16 LAB
ANION GAP SERPL CALCULATED.3IONS-SCNC: 12.5 MMOL/L (ref 5–15)
BUN SERPL-MCNC: 29 MG/DL (ref 8–23)
BUN/CREAT SERPL: 15.2 (ref 7–25)
CALCIUM SPEC-SCNC: 9.8 MG/DL (ref 8.6–10.5)
CHLORIDE SERPL-SCNC: 100 MMOL/L (ref 98–107)
CO2 SERPL-SCNC: 23.5 MMOL/L (ref 22–29)
CREAT SERPL-MCNC: 1.91 MG/DL (ref 0.76–1.27)
DEPRECATED RDW RBC AUTO: 49.2 FL (ref 37–54)
EGFRCR SERPLBLD CKD-EPI 2021: 33.7 ML/MIN/1.73
ERYTHROCYTE [DISTWIDTH] IN BLOOD BY AUTOMATED COUNT: 15 % (ref 12.3–15.4)
GLUCOSE SERPL-MCNC: 163 MG/DL (ref 65–99)
HCT VFR BLD AUTO: 35.1 % (ref 37.5–51)
HGB BLD-MCNC: 11.2 G/DL (ref 13–17.7)
MCH RBC QN AUTO: 28.8 PG (ref 26.6–33)
MCHC RBC AUTO-ENTMCNC: 31.9 G/DL (ref 31.5–35.7)
MCV RBC AUTO: 90.2 FL (ref 79–97)
PLATELET # BLD AUTO: 243 10*3/MM3 (ref 140–450)
PMV BLD AUTO: 10 FL (ref 6–12)
POTASSIUM SERPL-SCNC: 4.6 MMOL/L (ref 3.5–5.2)
RBC # BLD AUTO: 3.89 10*6/MM3 (ref 4.14–5.8)
SODIUM SERPL-SCNC: 136 MMOL/L (ref 136–145)
WBC NRBC COR # BLD AUTO: 10.31 10*3/MM3 (ref 3.4–10.8)

## 2024-10-16 PROCEDURE — G0299 HHS/HOSPICE OF RN EA 15 MIN: HCPCS

## 2024-10-16 PROCEDURE — 80048 BASIC METABOLIC PNL TOTAL CA: CPT | Performed by: GENERAL PRACTICE

## 2024-10-16 PROCEDURE — 85027 COMPLETE CBC AUTOMATED: CPT | Performed by: GENERAL PRACTICE

## 2024-10-18 ENCOUNTER — HOME CARE VISIT (OUTPATIENT)
Dept: HOME HEALTH SERVICES | Facility: HOME HEALTHCARE | Age: 84
End: 2024-10-18
Payer: COMMERCIAL

## 2024-10-18 VITALS
HEART RATE: 81 BPM | SYSTOLIC BLOOD PRESSURE: 150 MMHG | RESPIRATION RATE: 18 BRPM | DIASTOLIC BLOOD PRESSURE: 80 MMHG | TEMPERATURE: 97.8 F | OXYGEN SATURATION: 99 %

## 2024-10-18 NOTE — CASE COMMUNICATION
THE FOLLOWING SUPPLIES WERE ORDERED 10/17/24 THROUGH [employer]:  KERLIX - 14 COVERED BY INSURANCE  4X4 BORDERED GAUZE - 18  ACE WRAPS - 15    2X2 GAUZE CANNOT BE ORDERED UNTIL 10/31  WOUND CLEANSER NOT COVERED BY INSURANCE  PAPER TAPE NOT COVERED BY INSURANCE

## 2024-10-23 ENCOUNTER — HOME CARE VISIT (OUTPATIENT)
Dept: HOME HEALTH SERVICES | Facility: HOME HEALTHCARE | Age: 84
End: 2024-10-23
Payer: COMMERCIAL

## 2024-10-23 VITALS
TEMPERATURE: 97.9 F | DIASTOLIC BLOOD PRESSURE: 60 MMHG | RESPIRATION RATE: 18 BRPM | OXYGEN SATURATION: 97 % | HEART RATE: 70 BPM | SYSTOLIC BLOOD PRESSURE: 126 MMHG

## 2024-10-23 PROCEDURE — G0300 HHS/HOSPICE OF LPN EA 15 MIN: HCPCS

## 2024-10-23 NOTE — HOME HEALTH
Wound care completed per MD orders, patient tolerated well. No signs/symptoms infection noted. Measurements and pictures taken and uploaded. Caregiver completes care daily when no visit. Patient has appointments on wednesday of this week, not able to do a visit.  Tuesday and Thursday of next week free for a visit.

## 2024-10-29 ENCOUNTER — HOME CARE VISIT (OUTPATIENT)
Dept: HOME HEALTH SERVICES | Facility: HOME HEALTHCARE | Age: 84
End: 2024-10-29
Payer: COMMERCIAL

## 2024-10-31 ENCOUNTER — HOME CARE VISIT (OUTPATIENT)
Dept: HOME HEALTH SERVICES | Facility: HOME HEALTHCARE | Age: 84
End: 2024-10-31
Payer: COMMERCIAL

## 2024-10-31 PROCEDURE — G0162 HHC RN E&M PLAN SVS, 15 MIN: HCPCS

## 2024-11-02 VITALS
SYSTOLIC BLOOD PRESSURE: 126 MMHG | RESPIRATION RATE: 16 BRPM | OXYGEN SATURATION: 98 % | DIASTOLIC BLOOD PRESSURE: 70 MMHG | TEMPERATURE: 98.6 F | HEART RATE: 69 BPM

## 2025-04-23 ENCOUNTER — TRANSCRIBE ORDERS (OUTPATIENT)
Dept: ADMINISTRATIVE | Facility: HOSPITAL | Age: 85
End: 2025-04-23
Payer: MEDICARE

## 2025-04-23 DIAGNOSIS — J84.9 ILD (INTERSTITIAL LUNG DISEASE): Primary | ICD-10-CM

## 2025-05-07 ENCOUNTER — HOSPITAL ENCOUNTER (OUTPATIENT)
Dept: CT IMAGING | Facility: HOSPITAL | Age: 85
Discharge: HOME | End: 2025-05-07
Admitting: HOSPITALIST
Payer: MEDICARE

## 2025-05-07 DIAGNOSIS — J84.9 ILD (INTERSTITIAL LUNG DISEASE): ICD-10-CM

## 2025-05-07 PROCEDURE — 71250 CT THORAX DX C-: CPT
